# Patient Record
Sex: MALE | Race: ASIAN | NOT HISPANIC OR LATINO | URBAN - METROPOLITAN AREA
[De-identification: names, ages, dates, MRNs, and addresses within clinical notes are randomized per-mention and may not be internally consistent; named-entity substitution may affect disease eponyms.]

---

## 2018-06-12 ENCOUNTER — EMERGENCY (EMERGENCY)
Facility: HOSPITAL | Age: 83
LOS: 1 days | Discharge: HOME | End: 2018-06-12
Attending: EMERGENCY MEDICINE | Admitting: EMERGENCY MEDICINE
Payer: MEDICARE

## 2018-06-12 VITALS
HEART RATE: 77 BPM | SYSTOLIC BLOOD PRESSURE: 200 MMHG | TEMPERATURE: 98 F | RESPIRATION RATE: 16 BRPM | DIASTOLIC BLOOD PRESSURE: 88 MMHG | OXYGEN SATURATION: 99 %

## 2018-06-12 VITALS
SYSTOLIC BLOOD PRESSURE: 178 MMHG | HEART RATE: 51 BPM | RESPIRATION RATE: 17 BRPM | TEMPERATURE: 98 F | OXYGEN SATURATION: 100 % | DIASTOLIC BLOOD PRESSURE: 69 MMHG

## 2018-06-12 DIAGNOSIS — Z90.49 ACQUIRED ABSENCE OF OTHER SPECIFIED PARTS OF DIGESTIVE TRACT: Chronic | ICD-10-CM

## 2018-06-12 LAB
ALBUMIN SERPL ELPH-MCNC: 3 G/DL — LOW (ref 3.3–5)
ALP SERPL-CCNC: 65 U/L — SIGNIFICANT CHANGE UP (ref 40–120)
ALT FLD-CCNC: 11 U/L — SIGNIFICANT CHANGE UP (ref 4–41)
APPEARANCE UR: CLEAR — SIGNIFICANT CHANGE UP
AST SERPL-CCNC: 19 U/L — SIGNIFICANT CHANGE UP (ref 4–40)
BACTERIA # UR AUTO: SIGNIFICANT CHANGE UP
BASE EXCESS BLDV CALC-SCNC: 2.4 MMOL/L — SIGNIFICANT CHANGE UP
BASOPHILS # BLD AUTO: 0.02 K/UL — SIGNIFICANT CHANGE UP (ref 0–0.2)
BASOPHILS NFR BLD AUTO: 0.3 % — SIGNIFICANT CHANGE UP (ref 0–2)
BILIRUB SERPL-MCNC: < 0.2 MG/DL — LOW (ref 0.2–1.2)
BILIRUB UR-MCNC: NEGATIVE — SIGNIFICANT CHANGE UP
BLOOD GAS VENOUS - CREATININE: 1.57 MG/DL — HIGH (ref 0.5–1.3)
BLOOD UR QL VISUAL: HIGH
BUN SERPL-MCNC: 24 MG/DL — HIGH (ref 7–23)
CALCIUM SERPL-MCNC: 8.6 MG/DL — SIGNIFICANT CHANGE UP (ref 8.4–10.5)
CHLORIDE BLDV-SCNC: 110 MMOL/L — HIGH (ref 96–108)
CHLORIDE SERPL-SCNC: 104 MMOL/L — SIGNIFICANT CHANGE UP (ref 98–107)
CO2 SERPL-SCNC: 24 MMOL/L — SIGNIFICANT CHANGE UP (ref 22–31)
COLOR SPEC: SIGNIFICANT CHANGE UP
CREAT SERPL-MCNC: 1.57 MG/DL — HIGH (ref 0.5–1.3)
EOSINOPHIL # BLD AUTO: 0.04 K/UL — SIGNIFICANT CHANGE UP (ref 0–0.5)
EOSINOPHIL NFR BLD AUTO: 0.6 % — SIGNIFICANT CHANGE UP (ref 0–6)
GAS PNL BLDV: 135 MMOL/L — LOW (ref 136–146)
GLUCOSE BLDV-MCNC: 164 — HIGH (ref 70–99)
GLUCOSE SERPL-MCNC: 153 MG/DL — HIGH (ref 70–99)
GLUCOSE UR-MCNC: NEGATIVE — SIGNIFICANT CHANGE UP
HBA1C BLD-MCNC: 8 % — HIGH (ref 4–5.6)
HCO3 BLDV-SCNC: 25 MMOL/L — SIGNIFICANT CHANGE UP (ref 20–27)
HCT VFR BLD CALC: 34.3 % — LOW (ref 39–50)
HCT VFR BLDV CALC: 35.2 % — LOW (ref 39–51)
HGB BLD-MCNC: 10.8 G/DL — LOW (ref 13–17)
HGB BLDV-MCNC: 11.4 G/DL — LOW (ref 13–17)
IMM GRANULOCYTES # BLD AUTO: 0.02 # — SIGNIFICANT CHANGE UP
IMM GRANULOCYTES NFR BLD AUTO: 0.3 % — SIGNIFICANT CHANGE UP (ref 0–1.5)
KETONES UR-MCNC: NEGATIVE — SIGNIFICANT CHANGE UP
LACTATE BLDV-MCNC: 0.7 MMOL/L — SIGNIFICANT CHANGE UP (ref 0.5–2)
LEUKOCYTE ESTERASE UR-ACNC: NEGATIVE — SIGNIFICANT CHANGE UP
LIDOCAIN IGE QN: 33.9 U/L — SIGNIFICANT CHANGE UP (ref 7–60)
LYMPHOCYTES # BLD AUTO: 1.25 K/UL — SIGNIFICANT CHANGE UP (ref 1–3.3)
LYMPHOCYTES # BLD AUTO: 19.7 % — SIGNIFICANT CHANGE UP (ref 13–44)
MCHC RBC-ENTMCNC: 26.9 PG — LOW (ref 27–34)
MCHC RBC-ENTMCNC: 31.5 % — LOW (ref 32–36)
MCV RBC AUTO: 85.3 FL — SIGNIFICANT CHANGE UP (ref 80–100)
MONOCYTES # BLD AUTO: 0.53 K/UL — SIGNIFICANT CHANGE UP (ref 0–0.9)
MONOCYTES NFR BLD AUTO: 8.4 % — SIGNIFICANT CHANGE UP (ref 2–14)
MUCOUS THREADS # UR AUTO: SIGNIFICANT CHANGE UP
NEUTROPHILS # BLD AUTO: 4.47 K/UL — SIGNIFICANT CHANGE UP (ref 1.8–7.4)
NEUTROPHILS NFR BLD AUTO: 70.7 % — SIGNIFICANT CHANGE UP (ref 43–77)
NITRITE UR-MCNC: NEGATIVE — SIGNIFICANT CHANGE UP
NRBC # FLD: 0 — SIGNIFICANT CHANGE UP
PCO2 BLDV: 53 MMHG — HIGH (ref 41–51)
PH BLDV: 7.34 PH — SIGNIFICANT CHANGE UP (ref 7.32–7.43)
PH UR: 6 — SIGNIFICANT CHANGE UP (ref 4.6–8)
PLATELET # BLD AUTO: 168 K/UL — SIGNIFICANT CHANGE UP (ref 150–400)
PMV BLD: 10 FL — SIGNIFICANT CHANGE UP (ref 7–13)
PO2 BLDV: 30 MMHG — LOW (ref 35–40)
POTASSIUM BLDV-SCNC: 4.4 MMOL/L — SIGNIFICANT CHANGE UP (ref 3.4–4.5)
POTASSIUM SERPL-MCNC: 4.8 MMOL/L — SIGNIFICANT CHANGE UP (ref 3.5–5.3)
POTASSIUM SERPL-SCNC: 4.8 MMOL/L — SIGNIFICANT CHANGE UP (ref 3.5–5.3)
PROT SERPL-MCNC: 6.3 G/DL — SIGNIFICANT CHANGE UP (ref 6–8.3)
PROT UR-MCNC: 300 MG/DL — SIGNIFICANT CHANGE UP
RBC # BLD: 4.02 M/UL — LOW (ref 4.2–5.8)
RBC # FLD: 13.2 % — SIGNIFICANT CHANGE UP (ref 10.3–14.5)
RBC CASTS # UR COMP ASSIST: SIGNIFICANT CHANGE UP (ref 0–?)
SAO2 % BLDV: 49.4 % — LOW (ref 60–85)
SODIUM SERPL-SCNC: 138 MMOL/L — SIGNIFICANT CHANGE UP (ref 135–145)
SP GR SPEC: 1.01 — SIGNIFICANT CHANGE UP (ref 1–1.04)
UROBILINOGEN FLD QL: NORMAL MG/DL — SIGNIFICANT CHANGE UP
WBC # BLD: 6.33 K/UL — SIGNIFICANT CHANGE UP (ref 3.8–10.5)
WBC # FLD AUTO: 6.33 K/UL — SIGNIFICANT CHANGE UP (ref 3.8–10.5)
WBC UR QL: SIGNIFICANT CHANGE UP (ref 0–?)

## 2018-06-12 PROCEDURE — 99220: CPT

## 2018-06-12 PROCEDURE — 74181 MRI ABDOMEN W/O CONTRAST: CPT | Mod: 26

## 2018-06-12 PROCEDURE — 74177 CT ABD & PELVIS W/CONTRAST: CPT | Mod: 26

## 2018-06-12 PROCEDURE — 93010 ELECTROCARDIOGRAM REPORT: CPT | Mod: 59

## 2018-06-12 RX ORDER — CEFTRIAXONE 500 MG/1
1 INJECTION, POWDER, FOR SOLUTION INTRAMUSCULAR; INTRAVENOUS EVERY 24 HOURS
Qty: 0 | Refills: 0 | Status: DISCONTINUED | OUTPATIENT
Start: 2018-06-12 | End: 2018-06-16

## 2018-06-12 RX ORDER — CEFDINIR 250 MG/5ML
1 POWDER, FOR SUSPENSION ORAL
Qty: 28 | Refills: 0 | OUTPATIENT
Start: 2018-06-12 | End: 2018-06-25

## 2018-06-12 RX ORDER — METOPROLOL TARTRATE 50 MG
50 TABLET ORAL ONCE
Qty: 0 | Refills: 0 | Status: COMPLETED | OUTPATIENT
Start: 2018-06-12 | End: 2018-06-12

## 2018-06-12 RX ORDER — SODIUM CHLORIDE 9 MG/ML
1000 INJECTION INTRAMUSCULAR; INTRAVENOUS; SUBCUTANEOUS ONCE
Qty: 0 | Refills: 0 | Status: COMPLETED | OUTPATIENT
Start: 2018-06-12 | End: 2018-06-12

## 2018-06-12 RX ORDER — DEXTROSE 50 % IN WATER 50 %
50 SYRINGE (ML) INTRAVENOUS ONCE
Qty: 0 | Refills: 0 | Status: COMPLETED | OUTPATIENT
Start: 2018-06-12 | End: 2018-06-12

## 2018-06-12 RX ADMIN — Medication 50 MILLILITER(S): at 13:05

## 2018-06-12 RX ADMIN — SODIUM CHLORIDE 1000 MILLILITER(S): 9 INJECTION INTRAMUSCULAR; INTRAVENOUS; SUBCUTANEOUS at 08:45

## 2018-06-12 RX ADMIN — CEFTRIAXONE 100 GRAM(S): 500 INJECTION, POWDER, FOR SOLUTION INTRAMUSCULAR; INTRAVENOUS at 11:49

## 2018-06-12 RX ADMIN — Medication 50 MILLIGRAM(S): at 08:54

## 2018-06-12 NOTE — ED ADULT NURSE NOTE - CAS DISCH TRANSFER METHOD
Follow up with MD as directed call if any increased pain, fever, chills, pain on urination, increased oozing and drainage at incision.
Private car

## 2018-06-12 NOTE — ED CDU PROVIDER INITIAL DAY NOTE - ATTENDING CONTRIBUTION TO CARE
I, Jennifer Cabot, MD, have performed a history and physical exam of the patient and discussed their management with the ACP.  I reviewed the PA's note and agree with the documented findings and plan of care. My medical decision making and observations are found above.    83M with PMH of CAD s/p stents and CABG, HTN, HLD, and PSH of CABG and CCY who presented to the ED with RLQ pain and right flank pain since yesterday.  No F/C/N/V/D/urinary sx/CP/SOB.  CT shows mild dilation of the CBD (which may be consistent with post-CCY changes) and no other acute pathology.  The patient is being transferred to the CDU for MRCP.  Cardiology confirms that stents are compatible with MRI.  Will speak with CT surgery re: wires and MR.  On exam, hypertensive but NAD, lungs CTAB, heart sounds normal, abd soft, ND, TTP in the RLQ, no G/R, + R CVAT.  UA + for bacteria.  Bili < 0.2, LFTs and AP normal.  Likely pyelo, but will obtain MRCP to assess for recurrent CBD stone.  Will tx pyelo with CTX, followed by keflex upon discharge. I, Jennifer Cabot, MD, have performed a history and physical exam of the patient and discussed their management with the ACP.  I reviewed the PA's note and agree with the documented findings and plan of care. My medical decision making and observations are found above.    83M with PMH of CAD s/p stents and CABG, HTN, HLD, and PSH of CABG and CCY who presented to the ED with RLQ pain and right flank pain since yesterday.  No F/C/N/V/D/urinary sx/CP/SOB.  CT shows mild dilation of the CBD (which may be consistent with post-CCY changes) and no other acute pathology.  The patient is being transferred to the CDU for MRCP.  Cardiology confirms that stents are compatible with MRI.  Will speak with CT surgery re: wires and MR.  On exam, hypertensive but NAD, lungs CTAB, heart sounds normal, abd soft, ND, TTP in the RLQ, no G/R, + R CVAT.  UA + for bacteria.  Bili < 0.2, LFTs and AP normal.  Likely pyelo, but will obtain MRCP to assess for recurrent CBD stone.  Will tx pyelo with CTX, followed by cefdinir upon discharge.

## 2018-06-12 NOTE — ED ADULT NURSE NOTE - CHIEF COMPLAINT QUOTE
Pt is [place]. C/o right flank pain radiating around to RLQ since yesterday. Worse with movement. Denies urinary symptoms or trauma to area. H/o DM, HTN.

## 2018-06-12 NOTE — ED PROVIDER NOTE - PROGRESS NOTE DETAILS
Spoke with GI, indicated 1.2cm CBC is concerning s/p cholecystecomy in a pt who is having pain symptoms. Ordered MRCP and spoke with CDU to come eval pt.

## 2018-06-12 NOTE — ED CDU PROVIDER INITIAL DAY NOTE - PROGRESS NOTE DETAILS
83M with PMH of CAD s/p stents and CABG, HTN, HLD, and PSH of CABG and CCY who presented to the ED with RLQ pain and right flank pain since yesterday.  No F/C/N/V/D/urinary sx/CP/SOB.  CT shows mild dilation of the CBD (which may be consistent with post-CCY changes) and no other acute pathology.  The patient is being transferred to the CDU for MRCP.  Cardiology confirms that stents are compatible with MRI.  Will speak with CT surgery re: wires and MR.  On exam, hypertensive but NAD, lungs CTAB, heart sounds normal, abd soft, ND, TTP in the RLQ, no G/R, + R CVAT.  UA + for bacteria.  Bili < 0.2, LFTs and AP normal.  Likely pyelo, but will obtain MRCP to assess for recurrent CBD stone.  Will tx pyelo with CTX, followed by keflex upon discharge. 83M with PMH of CAD s/p stents and CABG, HTN, HLD, and PSH of CABG and CCY who presented to the ED with RLQ pain and right flank pain since yesterday.  No F/C/N/V/D/urinary sx/CP/SOB.  CT shows mild dilation of the CBD (which may be consistent with post-CCY changes) and no other acute pathology.  The patient is being transferred to the CDU for MRCP.  Cardiology confirms that stents are compatible with MRI.  Will speak with CT surgery re: wires and MR.  On exam, hypertensive but NAD, lungs CTAB, heart sounds normal, abd soft, ND, TTP in the RLQ, no G/R, + R CVAT.  UA + for bacteria.  Bili < 0.2, LFTs and AP normal.  Likely pyelo, but will obtain MRCP to assess for recurrent CBD stone.  Will tx pyelo with CTX, followed by cefdinir upon discharge. MRCP without any obstructing stones. Discussed with attending, patient can dispo home to f/u with PCP and GI. The patient was given verbal and written discharge instructions. Specifically, instructions when to return to the ED and when to seek follow-up from their pcp was discussed. Any specialty follow-up was discussed, including how to make an appointment.  Instructions were discussed in simple, plain language and was understood by the patient. The patient understands that should their symptoms worsen or any new symptoms arise, they should return to the ED immediately for further evaluation. All pt's questions were answered. Patient verbalizes understanding. Instructions were given to son and grandson.

## 2018-06-12 NOTE — ED ADULT NURSE NOTE - OBJECTIVE STATEMENT
pt. w/ hx. DM and HTN c/o rlq abd pain radiating to right flank pain. Pt. denies any N/V , and bleeding or burning during urination. Pt. hard of hearing A&Ox3 appears in NAD. Pt. Vitals as noted, and in no acute distress. Awaiting further dispo by MD.

## 2018-06-12 NOTE — ED CDU PROVIDER DISPOSITION NOTE - CLINICAL COURSE
Cabot: 83M with PMH of CAD s/p stents and CABG, HTN, HLD, and PSH of CABG and CCY who presented to the ED with RLQ pain and right flank pain since yesterday.  No F/C/N/V/D/urinary sx/CP/SOB.  CT shows mild dilation of the CBD (which may be consistent with post-CCY changes), a small pancreatic cyst, and no other acute pathology.  The patient was transferred to the CDU for MRCP, which showed no stone or other obstructing lesion.  UA + for bacteria.  Bili < 0.2, LFTs and AP normal.  On exam, hypertensive but NAD, lungs CTAB, heart sounds normal, abd soft, ND, TTP in the RLQ, no G/R, + R CVAT.  Impression: Likely pyelonephritis.  The patient received IV ceftriaxone in the CDU and will receive keflex upon discharge for two weeks.  He will follow up with his PMD and also with GI re: the cyst in the pancreas. Cabot: 83M with PMH of CAD s/p stents and CABG, HTN, HLD, and PSH of CABG and CCY who presented to the ED with RLQ pain and right flank pain since yesterday.  No F/C/N/V/D/urinary sx/CP/SOB.  CT shows mild dilation of the CBD (which may be consistent with post-CCY changes), a small pancreatic cyst, and no other acute pathology.  The patient was transferred to the CDU for MRCP, which showed no stone or other obstructing lesion.  UA + for bacteria.  Bili < 0.2, LFTs and AP normal.  On exam, hypertensive but NAD, lungs CTAB, heart sounds normal, abd soft, ND, TTP in the RLQ, no G/R, + R CVAT.  Impression: Likely pyelonephritis.  The patient received IV ceftriaxone in the CDU and will receive cefdinir upon discharge for two weeks.  He will follow up with his PMD and also with GI re: the cyst in the pancreas. Cabot: 83M with PMH of CAD s/p stents and CABG, HTN, HLD, and PSH of CABG and CCY who presented to the ED with RLQ pain and right flank pain since yesterday.  No F/C/N/V/D/urinary sx/CP/SOB.  CT shows mild dilation of the CBD (which may be consistent with post-CCY changes), a small pancreatic cyst, and no other acute pathology.  The patient was transferred to the CDU for MRCP, which showed no stone or other obstructing lesion.  UA + for bacteria.  Bili < 0.2, LFTs and AP normal.  On exam, hypertensive but NAD, lungs CTAB, heart sounds normal, abd soft, ND, TTP in the RLQ, no G/R, + mild R CVAT.  Impression: Likely pyelonephritis.  The patient received IV ceftriaxone in the CDU and will receive cefdinir upon discharge for two weeks.  He will follow up with his PMD and also with GI re: the cyst in the pancreas.

## 2018-06-12 NOTE — ED CDU PROVIDER INITIAL DAY NOTE - MEDICAL DECISION MAKING DETAILS
83M with PMH of CAD s/p stents and CABG, HTN, HLD, and PSH of CABG and CCY who presented to the ED with RLQ pain and right flank pain since yesterday.  No F/C/N/V/D/urinary sx/CP/SOB.  CT shows mild dilation of the CBD (which may be consistent with post-CCY changes) and no other acute pathology.  The patient is being transferred to the CDU for MRCP.  Cardiology confirms that stents are compatible with MRI.  Will speak with CT surgery re: wires and MR.  On exam, hypertensive but NAD, lungs CTAB, heart sounds normal, abd soft, ND, TTP in the RLQ, no G/R, + R CVAT.  UA + for bacteria.  Bili < 0.2, LFTs and AP normal.  Likely pyelo, but will obtain MRCP to assess for recurrent CBD stone.  Will tx pyelo with CTX, followed by keflex upon discharge.

## 2018-06-12 NOTE — ED PROVIDER NOTE - OBJECTIVE STATEMENT
82 yo male with PMH of CAD s/p CABG, HTN, HLD, hard of hearing, presents to the ED c/o abdominal pain and right flank pain since yesterday. States pain is RUQ radiating to right flank, sharp in nature, and is exacerbated with movement and almost imperceptible when stationary. Pt denies fever, chills, n/v, urinary symptoms, trauma, previous abdominal or back surgery, however on chart review, patient is s/p cholecystectomy. Appears NAD lying supine in bed, speaking in full sentences, A&O x3. Pt appears very well for his stated age but is a relatively poor historian.

## 2018-06-12 NOTE — ED CDU PROVIDER INITIAL DAY NOTE - OBJECTIVE STATEMENT
83M with PMH of CAD s/p stents and CABG, HTN, HLD, and PSH of CABG and CCY who presented to the ED with RLQ pain and right flank pain since yesterday.  No F/C/N/V/D/urinary sx/CP/SOB.  CT shows mild dilation of the CBD (which may be consistent with post-CCY changes) and no other acute pathology.  The patient is being transferred to the CDU for MRCP.  Cardiology confirms that stents are compatible with MRI.  Will speak with CT surgery re: wires and MR.  On exam, hypertensive but NAD, lungs CTAB, heart sounds normal, abd soft, ND, TTP in the RLQ, no G/R, + R CVAT.  UA + for bacteria.  Bili < 0.2, LFTs and AP normal.  Likely pyelo, but will obtain MRCP to assess for recurrent CBD stone.  Will tx pyelo with CTX, followed by keflex upon discharge. 83M with PMH of CAD s/p stents and CABG, HTN, HLD, and PSH of CABG and CCY who presented to the ED with RLQ pain and right flank pain since yesterday.  No F/C/N/V/D/urinary sx/CP/SOB.  CT shows mild dilation of the CBD (which may be consistent with post-CCY changes) and no other acute pathology.  The patient is being transferred to the CDU for MRCP.  Cardiology confirms that stents are compatible with MRI.  Will speak with CT surgery re: wires and MR.  On exam, hypertensive but NAD, lungs CTAB, heart sounds normal, abd soft, ND, TTP in the RLQ, no G/R, + R CVAT.  UA + for bacteria.  Bili < 0.2, LFTs and AP normal.  Likely pyelo, but will obtain MRCP to assess for recurrent CBD stone.  Will tx pyelo with CTX, followed by cefdinir upon discharge.

## 2018-06-12 NOTE — ED PROVIDER NOTE - ATTENDING CONTRIBUTION TO CARE
82 yo with R sided abd pain rad to R back/flank.  Not related to food and worse with movement.  No urinary complaints and no fever    Gen: Well appearing in NAD  Head: NC/AT  Neck: trachea midline  Resp:  No distress  Abd: soft Tender to palpation in the RLQ, ND  Ext: no deformities  Neuro:  A&O appears non focal  Skin:  Warm and dry as visualized    Pt with Abd pain in setting of history of cholecystecomy.  Will require further imaging and dispo based on results of imaging

## 2018-06-12 NOTE — ED PROVIDER NOTE - MEDICAL DECISION MAKING DETAILS
84 yo male with PMH of CAD, CABG, cholecystectomy, presenting with right sided abdominal and flank pain since yesterday exacerbated with movement; EKG, labs including lipase and lactate, UA and urine culture, CT abd/pelvis w/ contrast if Cr ok.

## 2018-06-12 NOTE — ED CDU PROVIDER DISPOSITION NOTE - ATTENDING CONTRIBUTION TO CARE
I, Jennifer Cabot, MD, have performed a history and physical exam of the patient and discussed their management with the ACP.  I reviewed the PA's note and agree with the documented findings and plan of care. My medical decision making and observations are found above.    Cabot: 83M with PMH of CAD s/p stents and CABG, HTN, HLD, and PSH of CABG and CCY who presented to the ED with RLQ pain and right flank pain since yesterday.  No F/C/N/V/D/urinary sx/CP/SOB.  CT shows mild dilation of the CBD (which may be consistent with post-CCY changes), a small pancreatic cyst, and no other acute pathology.  The patient was transferred to the CDU for MRCP, which showed no stone or other obstructing lesion.  UA + for bacteria.  Bili < 0.2, LFTs and AP normal.  On exam, hypertensive but NAD, lungs CTAB, heart sounds normal, abd soft, ND, TTP in the RLQ, no G/R, + R CVAT.  Impression: Likely pyelonephritis.  The patient received IV ceftriaxone in the CDU and will receive keflex upon discharge for two weeks.  He will follow up with his PMD and also with GI re: the cyst in the pancreas. I, Jennifer Cabot, MD, have performed a history and physical exam of the patient and discussed their management with the ACP.  I reviewed the PA's note and agree with the documented findings and plan of care. My medical decision making and observations are found above.    Cabot: 83M with PMH of CAD s/p stents and CABG, HTN, HLD, and PSH of CABG and CCY who presented to the ED with RLQ pain and right flank pain since yesterday.  No F/C/N/V/D/urinary sx/CP/SOB.  CT shows mild dilation of the CBD (which may be consistent with post-CCY changes), a small pancreatic cyst, and no other acute pathology.  The patient was transferred to the CDU for MRCP, which showed no stone or other obstructing lesion.  UA + for bacteria.  Bili < 0.2, LFTs and AP normal.  On exam, hypertensive but NAD, lungs CTAB, heart sounds normal, abd soft, ND, TTP in the RLQ, no G/R, + R CVAT.  Impression: Likely pyelonephritis.  The patient received IV ceftriaxone in the CDU and will receive cefdinir upon discharge for two weeks.  He will follow up with his PMD and also with GI re: the cyst in the pancreas. I, Jennifer Cabot, MD, have performed a history and physical exam of the patient and discussed their management with the ACP.  I reviewed the PA's note and agree with the documented findings and plan of care. My medical decision making and observations are found above.    Cabot: 83M with PMH of CAD s/p stents and CABG, HTN, HLD, and PSH of CABG and CCY who presented to the ED with RLQ pain and right flank pain since yesterday.  No F/C/N/V/D/urinary sx/CP/SOB.  CT shows mild dilation of the CBD (which may be consistent with post-CCY changes), a small pancreatic cyst, and no other acute pathology.  The patient was transferred to the CDU for MRCP, which showed no stone or other obstructing lesion.  UA + for bacteria.  Bili < 0.2, LFTs and AP normal.  On exam, hypertensive but NAD, lungs CTAB, heart sounds normal, abd soft, ND, TTP in the RLQ, no G/R, + R mild CVAT.  Impression: Likely pyelonephritis.  The patient received IV ceftriaxone in the CDU and will receive cefdinir upon discharge for two weeks.  He will follow up with his PMD and also with GI re: the cyst in the pancreas.

## 2018-06-12 NOTE — ED ADULT TRIAGE NOTE - CHIEF COMPLAINT QUOTE
Pt is [place]. C/o right flank pain radiating around to RLQ since yesterday. Worse with movement. Denies urinary symptoms or trauma to area. H/o DM, HTN.

## 2018-06-13 LAB — SPECIMEN SOURCE: SIGNIFICANT CHANGE UP

## 2018-06-14 LAB — BACTERIA UR CULT: SIGNIFICANT CHANGE UP

## 2018-10-03 ENCOUNTER — INPATIENT (INPATIENT)
Facility: HOSPITAL | Age: 83
LOS: 4 days | Discharge: NURSING, REHAB OR LONG-TERM CARE | End: 2018-10-08
Attending: INTERNAL MEDICINE | Admitting: INTERNAL MEDICINE
Payer: MEDICARE

## 2018-10-03 VITALS
HEART RATE: 71 BPM | SYSTOLIC BLOOD PRESSURE: 177 MMHG | RESPIRATION RATE: 18 BRPM | TEMPERATURE: 101 F | DIASTOLIC BLOOD PRESSURE: 73 MMHG | OXYGEN SATURATION: 100 %

## 2018-10-03 DIAGNOSIS — E11.9 TYPE 2 DIABETES MELLITUS WITHOUT COMPLICATIONS: ICD-10-CM

## 2018-10-03 DIAGNOSIS — Z29.9 ENCOUNTER FOR PROPHYLACTIC MEASURES, UNSPECIFIED: ICD-10-CM

## 2018-10-03 DIAGNOSIS — R80.1 PERSISTENT PROTEINURIA, UNSPECIFIED: ICD-10-CM

## 2018-10-03 DIAGNOSIS — R10.84 GENERALIZED ABDOMINAL PAIN: ICD-10-CM

## 2018-10-03 DIAGNOSIS — R50.9 FEVER, UNSPECIFIED: ICD-10-CM

## 2018-10-03 DIAGNOSIS — N18.3 CHRONIC KIDNEY DISEASE, STAGE 3 (MODERATE): ICD-10-CM

## 2018-10-03 DIAGNOSIS — I10 ESSENTIAL (PRIMARY) HYPERTENSION: ICD-10-CM

## 2018-10-03 DIAGNOSIS — Z90.49 ACQUIRED ABSENCE OF OTHER SPECIFIED PARTS OF DIGESTIVE TRACT: Chronic | ICD-10-CM

## 2018-10-03 LAB
ALBUMIN SERPL ELPH-MCNC: 2.7 G/DL — LOW (ref 3.3–5)
ALP SERPL-CCNC: 85 U/L — SIGNIFICANT CHANGE UP (ref 40–120)
ALT FLD-CCNC: 20 U/L — SIGNIFICANT CHANGE UP (ref 4–41)
APPEARANCE UR: CLEAR — SIGNIFICANT CHANGE UP
APTT BLD: 33.1 SEC — SIGNIFICANT CHANGE UP (ref 27.5–37.4)
AST SERPL-CCNC: 35 U/L — SIGNIFICANT CHANGE UP (ref 4–40)
BACTERIA # UR AUTO: NEGATIVE — SIGNIFICANT CHANGE UP
BASE EXCESS BLDV CALC-SCNC: 3.8 MMOL/L — SIGNIFICANT CHANGE UP
BASOPHILS # BLD AUTO: 0.02 K/UL — SIGNIFICANT CHANGE UP (ref 0–0.2)
BASOPHILS NFR BLD AUTO: 0.3 % — SIGNIFICANT CHANGE UP (ref 0–2)
BILIRUB SERPL-MCNC: 0.3 MG/DL — SIGNIFICANT CHANGE UP (ref 0.2–1.2)
BILIRUB UR-MCNC: NEGATIVE — SIGNIFICANT CHANGE UP
BLOOD GAS VENOUS - CREATININE: 1.5 MG/DL — HIGH (ref 0.5–1.3)
BLOOD UR QL VISUAL: SIGNIFICANT CHANGE UP
BUN SERPL-MCNC: 28 MG/DL — HIGH (ref 7–23)
CALCIUM SERPL-MCNC: 7.5 MG/DL — LOW (ref 8.4–10.5)
CHLORIDE BLDV-SCNC: 103 MMOL/L — SIGNIFICANT CHANGE UP (ref 96–108)
CHLORIDE SERPL-SCNC: 99 MMOL/L — SIGNIFICANT CHANGE UP (ref 98–107)
CO2 SERPL-SCNC: 26 MMOL/L — SIGNIFICANT CHANGE UP (ref 22–31)
COLOR SPEC: YELLOW — SIGNIFICANT CHANGE UP
CREAT SERPL-MCNC: 1.62 MG/DL — HIGH (ref 0.5–1.3)
EOSINOPHIL # BLD AUTO: 0 K/UL — SIGNIFICANT CHANGE UP (ref 0–0.5)
EOSINOPHIL NFR BLD AUTO: 0 % — SIGNIFICANT CHANGE UP (ref 0–6)
GAS PNL BLDV: 130 MMOL/L — LOW (ref 136–146)
GLUCOSE BLDC GLUCOMTR-MCNC: 148 MG/DL — HIGH (ref 70–99)
GLUCOSE BLDV-MCNC: 57 — LOW (ref 70–99)
GLUCOSE SERPL-MCNC: 58 MG/DL — LOW (ref 70–99)
GLUCOSE UR-MCNC: NEGATIVE — SIGNIFICANT CHANGE UP
HCO3 BLDV-SCNC: 27 MMOL/L — SIGNIFICANT CHANGE UP (ref 20–27)
HCT VFR BLD CALC: 29.2 % — LOW (ref 39–50)
HCT VFR BLDV CALC: 20.3 % — CRITICAL LOW (ref 39–51)
HGB BLD-MCNC: 9.3 G/DL — LOW (ref 13–17)
HGB BLDV-MCNC: 6.5 G/DL — CRITICAL LOW (ref 13–17)
HYALINE CASTS # UR AUTO: SIGNIFICANT CHANGE UP
IMM GRANULOCYTES # BLD AUTO: 0.03 # — SIGNIFICANT CHANGE UP
IMM GRANULOCYTES NFR BLD AUTO: 0.5 % — SIGNIFICANT CHANGE UP (ref 0–1.5)
INR BLD: 1.07 — SIGNIFICANT CHANGE UP (ref 0.88–1.17)
KETONES UR-MCNC: NEGATIVE — SIGNIFICANT CHANGE UP
LACTATE BLDV-MCNC: 1.3 MMOL/L — SIGNIFICANT CHANGE UP (ref 0.5–2)
LEUKOCYTE ESTERASE UR-ACNC: NEGATIVE — SIGNIFICANT CHANGE UP
LYMPHOCYTES # BLD AUTO: 1.1 K/UL — SIGNIFICANT CHANGE UP (ref 1–3.3)
LYMPHOCYTES # BLD AUTO: 18.9 % — SIGNIFICANT CHANGE UP (ref 13–44)
MAGNESIUM SERPL-MCNC: 1.8 MG/DL — SIGNIFICANT CHANGE UP (ref 1.6–2.6)
MCHC RBC-ENTMCNC: 26.5 PG — LOW (ref 27–34)
MCHC RBC-ENTMCNC: 31.8 % — LOW (ref 32–36)
MCV RBC AUTO: 83.2 FL — SIGNIFICANT CHANGE UP (ref 80–100)
MONOCYTES # BLD AUTO: 0.83 K/UL — SIGNIFICANT CHANGE UP (ref 0–0.9)
MONOCYTES NFR BLD AUTO: 14.2 % — HIGH (ref 2–14)
NEUTROPHILS # BLD AUTO: 3.85 K/UL — SIGNIFICANT CHANGE UP (ref 1.8–7.4)
NEUTROPHILS NFR BLD AUTO: 66.1 % — SIGNIFICANT CHANGE UP (ref 43–77)
NITRITE UR-MCNC: NEGATIVE — SIGNIFICANT CHANGE UP
NRBC # FLD: 0 — SIGNIFICANT CHANGE UP
PCO2 BLDV: 46 MMHG — SIGNIFICANT CHANGE UP (ref 41–51)
PH BLDV: 7.41 PH — SIGNIFICANT CHANGE UP (ref 7.32–7.43)
PH UR: 6.5 — SIGNIFICANT CHANGE UP (ref 5–8)
PHOSPHATE SERPL-MCNC: 2.2 MG/DL — LOW (ref 2.5–4.5)
PLATELET # BLD AUTO: 155 K/UL — SIGNIFICANT CHANGE UP (ref 150–400)
PMV BLD: 10.9 FL — SIGNIFICANT CHANGE UP (ref 7–13)
PO2 BLDV: 28 MMHG — LOW (ref 35–40)
POTASSIUM BLDV-SCNC: 3.6 MMOL/L — SIGNIFICANT CHANGE UP (ref 3.4–4.5)
POTASSIUM SERPL-MCNC: 3.9 MMOL/L — SIGNIFICANT CHANGE UP (ref 3.5–5.3)
POTASSIUM SERPL-SCNC: 3.9 MMOL/L — SIGNIFICANT CHANGE UP (ref 3.5–5.3)
PROT SERPL-MCNC: 6.1 G/DL — SIGNIFICANT CHANGE UP (ref 6–8.3)
PROT UR-MCNC: >600 — HIGH
PROTHROM AB SERPL-ACNC: 11.9 SEC — SIGNIFICANT CHANGE UP (ref 9.8–13.1)
RBC # BLD: 3.51 M/UL — LOW (ref 4.2–5.8)
RBC # FLD: 12.9 % — SIGNIFICANT CHANGE UP (ref 10.3–14.5)
RBC CASTS # UR COMP ASSIST: SIGNIFICANT CHANGE UP (ref 0–?)
SAO2 % BLDV: 48.1 % — LOW (ref 60–85)
SODIUM SERPL-SCNC: 135 MMOL/L — SIGNIFICANT CHANGE UP (ref 135–145)
SP GR SPEC: 1.02 — SIGNIFICANT CHANGE UP (ref 1–1.04)
SQUAMOUS # UR AUTO: SIGNIFICANT CHANGE UP
UROBILINOGEN FLD QL: SIGNIFICANT CHANGE UP
WBC # BLD: 5.83 K/UL — SIGNIFICANT CHANGE UP (ref 3.8–10.5)
WBC # FLD AUTO: 5.83 K/UL — SIGNIFICANT CHANGE UP (ref 3.8–10.5)
WBC UR QL: SIGNIFICANT CHANGE UP (ref 0–?)

## 2018-10-03 PROCEDURE — 71046 X-RAY EXAM CHEST 2 VIEWS: CPT | Mod: 26

## 2018-10-03 PROCEDURE — 74177 CT ABD & PELVIS W/CONTRAST: CPT | Mod: 26

## 2018-10-03 PROCEDURE — 99223 1ST HOSP IP/OBS HIGH 75: CPT

## 2018-10-03 RX ORDER — GLUCAGON INJECTION, SOLUTION 0.5 MG/.1ML
1 INJECTION, SOLUTION SUBCUTANEOUS ONCE
Qty: 0 | Refills: 0 | Status: DISCONTINUED | OUTPATIENT
Start: 2018-10-03 | End: 2018-10-08

## 2018-10-03 RX ORDER — INFLUENZA VIRUS VACCINE 15; 15; 15; 15 UG/.5ML; UG/.5ML; UG/.5ML; UG/.5ML
0.5 SUSPENSION INTRAMUSCULAR ONCE
Qty: 0 | Refills: 0 | Status: DISCONTINUED | OUTPATIENT
Start: 2018-10-03 | End: 2018-10-08

## 2018-10-03 RX ORDER — DEXTROSE 50 % IN WATER 50 %
12.5 SYRINGE (ML) INTRAVENOUS ONCE
Qty: 0 | Refills: 0 | Status: DISCONTINUED | OUTPATIENT
Start: 2018-10-03 | End: 2018-10-08

## 2018-10-03 RX ORDER — SENNA PLUS 8.6 MG/1
2 TABLET ORAL AT BEDTIME
Qty: 0 | Refills: 0 | Status: DISCONTINUED | OUTPATIENT
Start: 2018-10-03 | End: 2018-10-08

## 2018-10-03 RX ORDER — INSULIN LISPRO 100/ML
VIAL (ML) SUBCUTANEOUS AT BEDTIME
Qty: 0 | Refills: 0 | Status: DISCONTINUED | OUTPATIENT
Start: 2018-10-03 | End: 2018-10-08

## 2018-10-03 RX ORDER — VANCOMYCIN HCL 1 G
1000 VIAL (EA) INTRAVENOUS ONCE
Qty: 0 | Refills: 0 | Status: COMPLETED | OUTPATIENT
Start: 2018-10-03 | End: 2018-10-03

## 2018-10-03 RX ORDER — FINASTERIDE 5 MG/1
5 TABLET, FILM COATED ORAL DAILY
Qty: 0 | Refills: 0 | Status: DISCONTINUED | OUTPATIENT
Start: 2018-10-03 | End: 2018-10-08

## 2018-10-03 RX ORDER — HEPARIN SODIUM 5000 [USP'U]/ML
5000 INJECTION INTRAVENOUS; SUBCUTANEOUS EVERY 8 HOURS
Qty: 0 | Refills: 0 | Status: DISCONTINUED | OUTPATIENT
Start: 2018-10-03 | End: 2018-10-08

## 2018-10-03 RX ORDER — SODIUM CHLORIDE 9 MG/ML
1000 INJECTION INTRAMUSCULAR; INTRAVENOUS; SUBCUTANEOUS ONCE
Qty: 0 | Refills: 0 | Status: COMPLETED | OUTPATIENT
Start: 2018-10-03 | End: 2018-10-03

## 2018-10-03 RX ORDER — SODIUM CHLORIDE 9 MG/ML
1000 INJECTION, SOLUTION INTRAVENOUS
Qty: 0 | Refills: 0 | Status: DISCONTINUED | OUTPATIENT
Start: 2018-10-03 | End: 2018-10-04

## 2018-10-03 RX ORDER — ACETAMINOPHEN 500 MG
650 TABLET ORAL ONCE
Qty: 0 | Refills: 0 | Status: COMPLETED | OUTPATIENT
Start: 2018-10-03 | End: 2018-10-03

## 2018-10-03 RX ORDER — DEXTROSE 50 % IN WATER 50 %
25 SYRINGE (ML) INTRAVENOUS ONCE
Qty: 0 | Refills: 0 | Status: DISCONTINUED | OUTPATIENT
Start: 2018-10-03 | End: 2018-10-08

## 2018-10-03 RX ORDER — INSULIN LISPRO 100/ML
VIAL (ML) SUBCUTANEOUS
Qty: 0 | Refills: 0 | Status: DISCONTINUED | OUTPATIENT
Start: 2018-10-03 | End: 2018-10-08

## 2018-10-03 RX ORDER — AMLODIPINE BESYLATE 2.5 MG/1
10 TABLET ORAL DAILY
Qty: 0 | Refills: 0 | Status: DISCONTINUED | OUTPATIENT
Start: 2018-10-03 | End: 2018-10-08

## 2018-10-03 RX ORDER — DEXTROSE 50 % IN WATER 50 %
25 SYRINGE (ML) INTRAVENOUS ONCE
Qty: 0 | Refills: 0 | Status: COMPLETED | OUTPATIENT
Start: 2018-10-03 | End: 2018-10-03

## 2018-10-03 RX ORDER — METOPROLOL TARTRATE 50 MG
50 TABLET ORAL DAILY
Qty: 0 | Refills: 0 | Status: DISCONTINUED | OUTPATIENT
Start: 2018-10-03 | End: 2018-10-08

## 2018-10-03 RX ORDER — SODIUM CHLORIDE 9 MG/ML
1000 INJECTION, SOLUTION INTRAVENOUS
Qty: 0 | Refills: 0 | Status: DISCONTINUED | OUTPATIENT
Start: 2018-10-03 | End: 2018-10-08

## 2018-10-03 RX ORDER — DOCUSATE SODIUM 100 MG
100 CAPSULE ORAL DAILY
Qty: 0 | Refills: 0 | Status: DISCONTINUED | OUTPATIENT
Start: 2018-10-03 | End: 2018-10-08

## 2018-10-03 RX ORDER — PIPERACILLIN AND TAZOBACTAM 4; .5 G/20ML; G/20ML
3.38 INJECTION, POWDER, LYOPHILIZED, FOR SOLUTION INTRAVENOUS EVERY 8 HOURS
Qty: 0 | Refills: 0 | Status: DISCONTINUED | OUTPATIENT
Start: 2018-10-03 | End: 2018-10-05

## 2018-10-03 RX ORDER — POLYETHYLENE GLYCOL 3350 17 G/17G
17 POWDER, FOR SOLUTION ORAL DAILY
Qty: 0 | Refills: 0 | Status: DISCONTINUED | OUTPATIENT
Start: 2018-10-03 | End: 2018-10-08

## 2018-10-03 RX ORDER — ASPIRIN/CALCIUM CARB/MAGNESIUM 324 MG
325 TABLET ORAL DAILY
Qty: 0 | Refills: 0 | Status: DISCONTINUED | OUTPATIENT
Start: 2018-10-03 | End: 2018-10-08

## 2018-10-03 RX ORDER — DEXTROSE 50 % IN WATER 50 %
15 SYRINGE (ML) INTRAVENOUS ONCE
Qty: 0 | Refills: 0 | Status: DISCONTINUED | OUTPATIENT
Start: 2018-10-03 | End: 2018-10-08

## 2018-10-03 RX ORDER — LISINOPRIL 2.5 MG/1
1 TABLET ORAL
Qty: 0 | Refills: 0 | COMMUNITY

## 2018-10-03 RX ORDER — TAMSULOSIN HYDROCHLORIDE 0.4 MG/1
0.4 CAPSULE ORAL AT BEDTIME
Qty: 0 | Refills: 0 | Status: DISCONTINUED | OUTPATIENT
Start: 2018-10-03 | End: 2018-10-08

## 2018-10-03 RX ORDER — PIPERACILLIN AND TAZOBACTAM 4; .5 G/20ML; G/20ML
3.38 INJECTION, POWDER, LYOPHILIZED, FOR SOLUTION INTRAVENOUS ONCE
Qty: 0 | Refills: 0 | Status: COMPLETED | OUTPATIENT
Start: 2018-10-03 | End: 2018-10-03

## 2018-10-03 RX ORDER — LISINOPRIL 2.5 MG/1
40 TABLET ORAL DAILY
Qty: 0 | Refills: 0 | Status: DISCONTINUED | OUTPATIENT
Start: 2018-10-03 | End: 2018-10-05

## 2018-10-03 RX ORDER — ACETAMINOPHEN 500 MG
650 TABLET ORAL EVERY 6 HOURS
Qty: 0 | Refills: 0 | Status: DISCONTINUED | OUTPATIENT
Start: 2018-10-03 | End: 2018-10-08

## 2018-10-03 RX ORDER — ACETAMINOPHEN 500 MG
1000 TABLET ORAL ONCE
Qty: 0 | Refills: 0 | Status: DISCONTINUED | OUTPATIENT
Start: 2018-10-03 | End: 2018-10-03

## 2018-10-03 RX ADMIN — SENNA PLUS 2 TABLET(S): 8.6 TABLET ORAL at 22:37

## 2018-10-03 RX ADMIN — Medication 25 MILLILITER(S): at 11:50

## 2018-10-03 RX ADMIN — PIPERACILLIN AND TAZOBACTAM 25 GRAM(S): 4; .5 INJECTION, POWDER, LYOPHILIZED, FOR SOLUTION INTRAVENOUS at 22:38

## 2018-10-03 RX ADMIN — Medication 250 MILLIGRAM(S): at 07:37

## 2018-10-03 RX ADMIN — Medication 650 MILLIGRAM(S): at 15:07

## 2018-10-03 RX ADMIN — Medication 650 MILLIGRAM(S): at 15:37

## 2018-10-03 RX ADMIN — PIPERACILLIN AND TAZOBACTAM 3.38 GRAM(S): 4; .5 INJECTION, POWDER, LYOPHILIZED, FOR SOLUTION INTRAVENOUS at 06:30

## 2018-10-03 RX ADMIN — HEPARIN SODIUM 5000 UNIT(S): 5000 INJECTION INTRAVENOUS; SUBCUTANEOUS at 22:37

## 2018-10-03 RX ADMIN — SODIUM CHLORIDE 1000 MILLILITER(S): 9 INJECTION INTRAMUSCULAR; INTRAVENOUS; SUBCUTANEOUS at 06:30

## 2018-10-03 RX ADMIN — TAMSULOSIN HYDROCHLORIDE 0.4 MILLIGRAM(S): 0.4 CAPSULE ORAL at 22:37

## 2018-10-03 RX ADMIN — Medication 650 MILLIGRAM(S): at 11:49

## 2018-10-03 RX ADMIN — SODIUM CHLORIDE 50 MILLILITER(S): 9 INJECTION, SOLUTION INTRAVENOUS at 15:07

## 2018-10-03 RX ADMIN — Medication 650 MILLIGRAM(S): at 07:48

## 2018-10-03 RX ADMIN — PIPERACILLIN AND TAZOBACTAM 200 GRAM(S): 4; .5 INJECTION, POWDER, LYOPHILIZED, FOR SOLUTION INTRAVENOUS at 06:21

## 2018-10-03 RX ADMIN — SODIUM CHLORIDE 1000 MILLILITER(S): 9 INJECTION INTRAMUSCULAR; INTRAVENOUS; SUBCUTANEOUS at 06:20

## 2018-10-03 NOTE — H&P ADULT - NSHPPHYSICALEXAM_GEN_ALL_CORE
Vital Signs Last 24 Hrs  T(C): 37.8 (03 Oct 2018 15:38), Max: 38.1 (03 Oct 2018 04:57)  T(F): 100.1 (03 Oct 2018 15:38), Max: 100.6 (03 Oct 2018 04:57)  HR: 67 (03 Oct 2018 15:38) (54 - 71)  BP: 151/41 (03 Oct 2018 15:38) (118/48 - 177/73)  BP(mean): --  RR: 18 (03 Oct 2018 15:38) (16 - 18)  SpO2: 96% (03 Oct 2018 15:38) (96% - 100%)

## 2018-10-03 NOTE — H&P ADULT - PROBLEM SELECTOR PLAN 3
hypoglycemic todayl ikely in setting of poor PO intake without adjustment of insulin.  -will hold off on lantus tonight and monitor on HISS.  -can restart lasix after calculate requirements.

## 2018-10-03 NOTE — H&P ADULT - PROBLEM SELECTOR PLAN 1
unclear abdominal pain.   -patient with fever and abdominal pain history, negative hanna sign, no abdominal pain, guarding or rigidity.  -no jaundice, does not technically meet chacot triad for cholangitis.  -patient also with 2 different pains, first pain is food related, improved with BM, second pain is associated with urination.  For mid abdomianl pain, will treat constipation first with aggressive bowel regimen with colace, senna, miralax.    -start with full liquid diet tonight and advance as tolerated.  -for suprapubic pain, negative UA, prior treatd with 2 weeks of PO cephalosporin. Given BPH and urinary symptoms ?chronic prostatitis?  -may need long term fluoroquinolone therapy, but will defer to Dr. Hodges. unclear abdominal pain.   -patient with fever and abdominal pain history, negative hanna sign, no abdominal pain, guarding or rigidity.  -no jaundice, does not technically meet chacot triad for cholangitis.  -patient also with 2 different pains, first pain is food related, improved with BM, second pain is associated with urination.  For mid abdomianl pain, will treat constipation first with aggressive bowel regimen with colace, senna, miralax.    -start with full liquid diet tonight and advance as tolerated.  -for suprapubic pain, negative UA, prior treatd with 2 weeks of PO cephalosporin. Given BPH and urinary symptoms ?chronic prostatitis?  -may need long term fluoroquinolone therapy, but will defer to Dr. Hodges.  -not as convinced that pancreatic cyst or chronic dilated bile ducts are etiology of symptoms, but if continued occurrence of pain, may warrant further GI workup.

## 2018-10-03 NOTE — ED ADULT NURSE NOTE - ED STAT RN HANDOFF DETAILS
Report given to SURG3 (overflow unit) RN.  Pt AAOx3, respirations even and unlabored.  Family at bedside.  Pt stable for transport to unit.

## 2018-10-03 NOTE — ED PROVIDER NOTE - PROGRESS NOTE DETAILS
CARLOS Patricia- received sign out from Dr. Grewal resident. pt feeling much better. c/o burning on urination. +fever in ED. pt given gastroview now, awaiting CT. will continue to monitor. CARLOS Patricia- left message for callback with pcp Dr. Villarreal CARLOS Patricia- received sign out from Dr. Grewal resident. plan to admit. pt feeling much better. c/o burning on urination. +fever in ED. pt given gastroview now, awaiting CT. will continue to monitor. CARLOS Patricia- will admit pt to medicine. spoke with Dr. Villarreal's NP Autumn, suggesing to admit to Dr. Hodges. called Dr. Hodges and aware of plan. MAR paged. CARLOS Patricia- as per Dr. Moreland pt had episode of lightheadedness, FS noted to be 44. given dextrose and feeling much better. FS now 96.

## 2018-10-03 NOTE — ED ADULT NURSE REASSESSMENT NOTE - NS ED NURSE REASSESS COMMENT FT1
Pt received awake and alert x 3 . pt denies any nausea no vomiting noted awaiting abd ct. Pts family at bedside.

## 2018-10-03 NOTE — CONSULT NOTE ADULT - SUBJECTIVE AND OBJECTIVE BOX
CARDIOLOGY CONSULT - Dr. Villarreal     CHIEF COMPLAINT: abd pain     HPI:  84 y.o. male with PMed/surg hx as mentioned below  p/w abdominal x 1 week.  He endorses generalized weakness and today noted to have fever. Patient is known to the practice.  Cardiac hx includes HTN, DM, CAD s/p CABG (2016), and post op Afib . He was recently at [facility] for abdominal pain. CT abd/pelvis and MRCP at that time revealed a pancreatic lesion, being followed by GI (Dr. Ball. No recent or active chest pain or sob. Wife at bedside reports patient has cough. Denies palpitations, n/v, dizziness, orthopnea, PND, Le edema, or exertional symptoms. ROS otherwise negative      PAST MEDICAL & SURGICAL HISTORY:  3-vessel coronary artery disease  HTN (hypertension)  DM (diabetes mellitus)  S/P cholecystectomy  Post- op Afib         PREVIOUS DIAGNOSTIC TESTING:    [ x] Echocardiogram:    < from: Transthoracic Echocardiogram (03.07.16 @ 14:08) >  EF (Teicholtz): 56 %  ------------------------------------------------------------------------  Observations:  Mitral Valve: Mitral annular calcification, otherwise  normal mitral valve. Mild mitral regurgitation.  Aortic Valve/Aorta: Thickened aortic valve. Trace aortic  regurgitation.  Normal aortic root size. (Ao: 3.4 cm at the sinuses of  Valsalva).  Left Atrium: Moderately dilated left atrium.  LA volume  index = 45 cc/m2.  Left Ventricle: Normal left ventricular systolic function.  No segmental wall motion abnormalities. Increased relative  wall thickness with normal left ventricular mass index,  consistent with concentric left ventricular remodeling.  Right Heart: Normal right atrium. Normal right ventricular  size and function. Normal tricuspid valve.  Mild tricuspid  regurgitation. Normal pulmonic valve.  Pericardium/Pleura: Minimal pericardial effusion.  Hemodynamic: Estimated right atrial pressure is 8 mm Hg.  Inadequate tricuspid regurgitation Doppler envelope  precludes estimation of RVSP.  ------------------------------------------------------------------------  Conclusions:  1. Mitral annular calcification, otherwise normal mitral  valve. Mild mitral regurgitation.  2. Thickened aortic valve. Trace aortic regurgitation.  3. Normal aortic root size. (Ao: 3.4 cm at the sinuses of  Valsalva).  4. Moderately dilated left atrium.  LA volume index = 45  cc/m2.  5. Increased relative wall thickness with normal left  ventricular mass index, consistent with concentric left  ventricular remodeling.  6. Normal left ventricular systolic function. No segmental  wall motion abnormalities.  7. Normal right ventricular size and function.  8. Inadequate tricuspid regurgitation Doppler envelope  precludes estimation of RVSP.  9. Normal tricuspid valve.  Mild tricuspid regurgitation.  10. Minimal pericardial effusion.  *** No previous Echo exam.  ------------------------------------------------------------------------  Confirmed on  3/7/2016 - 14:58:17 by Demi Aviles M.D.  ------------------------------------------------------------------------    < end of copied text >  [ x]  Catheterization:  < from: Cardiac Cath Lab - Adult (03.01.16 @ 13:20) >  DIAGNOSTIC IMPRESSIONS: Severe multivessel CAD  DIAGNOSTIC RECOMMENDATIONS: CTS evaluation for CABG  INTERVENTIONAL IMPRESSIONS: Severe multivessel CAD  INTERVENTIONAL RECOMMENDATIONS: CTS evaluation for CABG  Prepared and signed by    < end of copied text >    [ ] Stress Test:  	      MEDICATIONS:  MEDICATIONS  (STANDING):      FAMILY HISTORY:  No pertinent family history in first degree relatives      SOCIAL HISTORY:    [x ] Non-smoker  [ ] Smoker  [ ] Alcohol    Allergies    No Known Allergies    Intolerances    	    REVIEW OF SYSTEMS:  CONSTITUTIONAL: No fever, weight loss, or fatigue  EYES: No eye pain, visual disturbances, or discharge  ENMT:  No difficulty hearing, tinnitus, vertigo; No sinus or throat pain  NECK: No pain or stiffness  RESPIRATORY: No cough, wheezing, chills or hemoptysis; No Shortness of Breath  CARDIOVASCULAR: No chest pain, palpitations, passing out, dizziness, or leg swelling  GASTROINTESTINAL: No abdominal or epigastric pain. No nausea, vomiting, or hematemesis; No diarrhea or constipation. No melena or hematochezia.  GENITOURINARY: No dysuria, frequency, hematuria, or incontinence  NEUROLOGICAL: No headaches, memory loss, loss of strength, numbness, or tremors  SKIN: No itching, burning, rashes, or lesions   	    [ ] All others negative	  [ ] Unable to obtain    PHYSICAL EXAM:  T(C): 36.9 (10-03-18 @ 10:37), Max: 38.1 (10-03-18 @ 04:57)  HR: 55 (10-03-18 @ 10:37) (55 - 71)  BP: 118/48 (10-03-18 @ 10:37) (118/48 - 177/73)  RR: 18 (10-03-18 @ 10:37) (16 - 18)  SpO2: 100% (10-03-18 @ 10:37) (99% - 100%)  Wt(kg): --  I&O's Summary      Appearance: Normal	  Psychiatry: A & O x 3, Mood & affect appropriate  HEENT:   Normal oral mucosa, PERRL, EOMI	  Lymphatic: No lymphadenopathy  Cardiovascular: Normal S1 S2,RRR  Respiratory: fine crackles bl at base   Gastrointestinal:  Soft, Non-tender, + BS	  Skin: No rashes, No ecchymoses, No cyanosis	  Neurologic: Non-focal  Extremities: Normal range of motion, No clubbing, cyanosis or edema  Vascular: Peripheral pulses palpable 2+ bilaterally    TELEMETRY: 	    ECG:  NSR with 1st degree AVB 	@ 67 bpm  RADIOLOGY:    < from: Xray Chest 2 Views PA/Lat (10.03.18 @ 06:35) >  INTERPRETATION:     Sternotomy wires and clips from previous cardiac surgery are present.   Heart is mildly enlarged but stable. No focal consolidations. No   effusions or congestion to suggest CHF.      COMPARISON:  March 14, 2016      IMPRESSION:  Clear lungs.        ----------------------------------------------------------------------------------------------------------------------------------    OTHER: 	  < from: CT Abdomen and Pelvis w/ Oral Cont and w/ IV Cont (10.03.18 @ 08:56) >    FINDINGS:    LOWER CHEST: Bibasilar dependent atelectasis with trace pleural effusions   bilaterally, right greater than left. Cardiomegaly. Coronary artery   calcifications. Sternotomy.    LIVER: Within normal limits.  BILE DUCTS: Intrahepatic and extrahepatic biliary ductal dilatation with   the common bile duct measuring 1.4 cm, unchanged.  GALLBLADDER: Cholecystectomy.  SPLEEN: Within normal limits.  PANCREAS: 1.2 cm cyst in the tail the pancreas, unchanged. No pancreatic   ductal dilatation  ADRENALS: Within normal limits.  KIDNEYS/URETERS: Subcentimeter hypodense foci in the left kidney, too   small to categorize. No hydronephrosis.    BLADDER: Within normal limits.  REPRODUCTIVE ORGANS: The prostate is enlarged.    BOWEL: No bowel obstruction. Scattered colonic diverticulosis without   inflammatory changes. Appendix is normal.  PERITONEUM: No ascites or pneumoperitoneum.  VESSELS:  Atherosclerotic changes.  RETROPERITONEUM: No lymphadenopathy.    ABDOMINAL WALL: Within normal limits.  BONES: Degenerative changes of the spine.    IMPRESSION:    Stable appearance of the bile ducts.    Normal appendix.          < end of copied text >  	  LABS:	 	    CARDIAC MARKERS:                                  9.3    5.83  )-----------( 155      ( 03 Oct 2018 05:45 )             29.2     10-03    135  |  99  |  28<H>  ----------------------------<  58<L>  3.9   |  26  |  1.62<H>    Ca    7.5<L>      03 Oct 2018 05:45  Phos  2.2     10-03  Mg     1.8     10-03    TPro  6.1  /  Alb  2.7<L>  /  TBili  0.3  /  DBili  x   /  AST  35  /  ALT  20  /  AlkPhos  85  10-03    PT/INR - ( 03 Oct 2018 06:35 )   PT: 11.9 SEC;   INR: 1.07          PTT - ( 03 Oct 2018 06:35 )  PTT:33.1 SEC  proBNP:   Lipid Profile:   HgA1c:   TSH:

## 2018-10-03 NOTE — ED PROVIDER NOTE - MEDICAL DECISION MAKING DETAILS
Sepsis.  Intrabdominal pathology vs. endocarditis vs. UTI.  Labs, chest Xray, BCx, UCx, Ct abdomen/pelvis, Vanc, zosyn, tylenol, fluids, admit.

## 2018-10-03 NOTE — H&P ADULT - NSHPLABSRESULTS_GEN_ALL_CORE
9.3    5.83  )-----------( 155      ( 03 Oct 2018 05:45 )             29.2    10-03    135  |  99  |  28<H>  ----------------------------<  58<L>  3.9   |  26  |  1.62<H>    Ca    7.5<L>      03 Oct 2018 05:45  Phos  2.2     10-03  Mg     1.8     10-03    TPro  6.1  /  Alb  2.7<L>  /  TBili  0.3  /  DBili  x   /  AST  35  /  ALT  20  /  AlkPhos  85  10-03    < from: CT Abdomen and Pelvis w/ Oral Cont and w/ IV Cont (10.03.18 @ 08:56) >      IMPRESSION:    Stable appearance of the bile ducts.    Normal appendix.            < end of copied text >    < from: Xray Chest 2 Views PA/Lat (10.03.18 @ 06:35) >      IMPRESSION:  Clear lungs.    < end of copied text >

## 2018-10-03 NOTE — ED ADULT NURSE NOTE - OBJECTIVE STATEMENT
Sujit RN: 84yom A&ox4 amb w/ cane presents to ED 6 c/o generalized abdominal/suprapubic pain x 2 days. pt also reports pain on urination. pt reports he was placed on abx for UTI 2 weeks ago w/ no resolve of symptoms. pt noted to be febrile upon arrival, unsure if he had a fever at home. pt denies associated cp, sob, dizziness, lightheadedness, n/v/d. breathing even/unlabored. abdomen soft, nontender, nondistended. skin warm, dry, and intact. 18g iv lock placed to R ac. labs sent. report given to mahogany Kent.

## 2018-10-03 NOTE — CONSULT NOTE ADULT - ASSESSMENT
84 year old male with htn, CAD s/p CABG, post op Afib, DM, s/p  cholecystectomy presenting with abd pain/ fever     1. CAD s/p CABG  cv stable no chest pain or sob, no decomp chf on exam  resume 325 mg ASA, BB and statin     2. Afib   no further reoccurrence. remains in SR   resume BB and ASA     3. HTN   elevated BP noted   resume norvasc 10 daily, metropolol 50 mg BID    4 Abd pain   CT abd pain unremarkable, revealing unchanged pancreatic cyst.   work up per ED, GI eval?    5. Fever  UA negative, chest xray unremarkable   ct abd revealing bibsilar atelectasis with trace pleural effusions bl R >L  pending UC, BC   work up per ED     dvt ppx

## 2018-10-03 NOTE — ED PROVIDER NOTE - ATTENDING CONTRIBUTION TO CARE
Yury: I have seen and examined the patient face to face, have reviewed and addended the HPI, PE and a/p as necessary.    84 y.o. male PMH DM, CAD s/p CABG (2016), s/p Cholecystectomy p/w abdominal x 1 week noted to have fever today in ED.  Noted to have increasing weakness over the course.  Denies nausea vomiting.  Pain is suprapubic and RUQ.  Denies chest pain, dizzinesss, sob, or any new or worsening symptoms.    GEN - NAD; well appearing; A+O x3; warm skin; CARD -s1s2, RRR, no M,G,R; PULM - CTA b/l, symmetric breath sounds; ABD:  +BS, RUQ tenderness, suprapubic tenderness; BACK: no CVA tenderness, Normal  spine; EXT: symmetric pulses, 2+ dp, capillary refill < 2 seconds, no clubbing, no cyanosis, no edema     85 yo M a/w ruq pain and suprapubic tenderness, noted to have recent cdu admission for pyelo sent home on cefedinir, now here with sepsis concern for recurrent pyelo, vs bacteremia, vs abd pathology  - ct abd pelvis, chest xray, ua, urine culture, ivf, reassess. likely admit.

## 2018-10-03 NOTE — H&P ADULT - HISTORY OF PRESENT ILLNESS
This is a 83 yo male PMhx DM type 2 (insulin dependent), CAD s/pCABG (2016), HTN, BPH, recent CDU stay for ?pyelonephritis sent out on 2 weeks of ceftinir (June, 2018) presenting for 1 week of abdominal pain and now low grade fevers.  Information obtained by wife as unable to communicate properly with patient given extreme hard of hearing, either with wife as intepreter or  services.  After discharge pain improved.  However last wednesday after dinner benjaminn started to have mid abdominal pain, crampy in nature, non-radiating.  Improved with BM.  This continued daily, worsening with food and improving with BM. However a few days ago, patient startd to have pain with urination and suprapubic tenderness to palpation.  Came to ED for further evaluation.    In ED: Given vanc/zosyn, D5 for hypoglycemia.    Currently patient without abdominal pain. No fever, chills, chest pain, SOB, n/v/d/c. States intermittent cough since abdominal pain occurred. No other complaints. No immobility, recent travel, sick contacts.

## 2018-10-03 NOTE — H&P ADULT - PROBLEM SELECTOR PLAN 6
U protein from 300 to >600  -no clinical signs of nephrotic syndrome but witlh albumin 2.7 will check protein/creatinine ratio as well as lipid profile for TG

## 2018-10-03 NOTE — H&P ADULT - NSHPLANGTRANSLATORFT_GEN_A_CORE
[employer] intepreter#[id number], however patient very hard of hearing, not able to provide information, information mostly obtained by wife at bedside.

## 2018-10-03 NOTE — ED PROVIDER NOTE - PHYSICAL EXAMINATION
Gen:  NAD, alert and oriented x 3  HEENT:  oropharynx clear, sclera clear, PERRL  Heart:  RRR, Grade III/VI holosystolic murmur loudest at apex, visible PMI  Lung:  CTA b/l, no wheeze or rales  Abd:  ND, soft, Diffuse moderate tenderness without guarding or rebound, RUQ laparotomy scar.  Ext:  No edema, no joint swelling  Skin:  No rash or ecchymosis.  Feels febrile to touch.  Neuro:  Nonfocal.

## 2018-10-03 NOTE — ED ADULT TRIAGE NOTE - CHIEF COMPLAINT QUOTE
pt brought in by ems for generalized abdominal pain and weakness since yesterday. denies nausea/vomiting/diarrhea. febrile in triage. pmh hld bph dm htn

## 2018-10-03 NOTE — ED PROVIDER NOTE - OBJECTIVE STATEMENT
84 y.o. male PMH DM, CAD s/p CABG (2016), s/p Cholecystectomy p/w abdominal x 1 week.  The pain is worse today.  No vomiting, no diarrhea, is able to eat but less than normal.  He also feels generalized weakness, and today there was fever.

## 2018-10-03 NOTE — ED ADULT NURSE NOTE - NSIMPLEMENTINTERV_GEN_ALL_ED
Implemented All Fall Risk Interventions:  [place] to call system. Call bell, personal items and telephone within reach. Instruct patient to call for assistance. Room bathroom lighting operational. Non-slip footwear when patient is off stretcher. Physically safe environment: no spills, clutter or unnecessary equipment. Stretcher in lowest position, wheels locked, appropriate side rails in place. Provide visual cue, wrist band, yellow gown, etc. Monitor gait and stability. Monitor for mental status changes and reorient to person, place, and time. Review medications for side effects contributing to fall risk. Reinforce activity limits and safety measures with patient and family.

## 2018-10-03 NOTE — ED ADULT NURSE REASSESSMENT NOTE - NS ED NURSE REASSESS COMMENT FT1
Pt noted to become diaphoretic after using restroom Finger stick obtained . Pts received half of amp 0f D50 for glucose of 45. pt remains awake and alert x 3 . Pt denies any nausea or vomiting. Pts wife at bedside. will continue to monitor.

## 2018-10-03 NOTE — H&P ADULT - PROBLEM SELECTOR PLAN 2
unclear etiology  -await BCx  -UA negative, CT abdomen/pelvis negative, Xray negative for sourece  -?chronic prostatitis vs. atalectasis vs. alternative source.  -contiue to monitor  -treat with Zosyn for now for intra-abdominal source, but unsure what exactly am treating. Will treat until Bcx return.

## 2018-10-04 ENCOUNTER — TRANSCRIPTION ENCOUNTER (OUTPATIENT)
Age: 83
End: 2018-10-04

## 2018-10-04 LAB
ALBUMIN SERPL ELPH-MCNC: 2.3 G/DL — LOW (ref 3.3–5)
ALP SERPL-CCNC: 84 U/L — SIGNIFICANT CHANGE UP (ref 40–120)
ALT FLD-CCNC: 23 U/L — SIGNIFICANT CHANGE UP (ref 4–41)
AST SERPL-CCNC: 34 U/L — SIGNIFICANT CHANGE UP (ref 4–40)
BILIRUB SERPL-MCNC: 0.3 MG/DL — SIGNIFICANT CHANGE UP (ref 0.2–1.2)
BUN SERPL-MCNC: 19 MG/DL — SIGNIFICANT CHANGE UP (ref 7–23)
CALCIUM SERPL-MCNC: 7.4 MG/DL — LOW (ref 8.4–10.5)
CHLORIDE SERPL-SCNC: 100 MMOL/L — SIGNIFICANT CHANGE UP (ref 98–107)
CO2 SERPL-SCNC: 23 MMOL/L — SIGNIFICANT CHANGE UP (ref 22–31)
CREAT SERPL-MCNC: 1.81 MG/DL — HIGH (ref 0.5–1.3)
GLUCOSE BLDC GLUCOMTR-MCNC: 116 MG/DL — HIGH (ref 70–99)
GLUCOSE BLDC GLUCOMTR-MCNC: 161 MG/DL — HIGH (ref 70–99)
GLUCOSE BLDC GLUCOMTR-MCNC: 227 MG/DL — HIGH (ref 70–99)
GLUCOSE SERPL-MCNC: 202 MG/DL — HIGH (ref 70–99)
HBA1C BLD-MCNC: 6.9 % — HIGH (ref 4–5.6)
HCT VFR BLD CALC: 27.8 % — LOW (ref 39–50)
HGB BLD-MCNC: 8.9 G/DL — LOW (ref 13–17)
MAGNESIUM SERPL-MCNC: 1.7 MG/DL — SIGNIFICANT CHANGE UP (ref 1.6–2.6)
MCHC RBC-ENTMCNC: 26.3 PG — LOW (ref 27–34)
MCHC RBC-ENTMCNC: 32 % — SIGNIFICANT CHANGE UP (ref 32–36)
MCV RBC AUTO: 82.2 FL — SIGNIFICANT CHANGE UP (ref 80–100)
NRBC # FLD: 0 — SIGNIFICANT CHANGE UP
PHOSPHATE SERPL-MCNC: 3.1 MG/DL — SIGNIFICANT CHANGE UP (ref 2.5–4.5)
PLATELET # BLD AUTO: 157 K/UL — SIGNIFICANT CHANGE UP (ref 150–400)
PMV BLD: 10.3 FL — SIGNIFICANT CHANGE UP (ref 7–13)
POTASSIUM SERPL-MCNC: 3.8 MMOL/L — SIGNIFICANT CHANGE UP (ref 3.5–5.3)
POTASSIUM SERPL-SCNC: 3.8 MMOL/L — SIGNIFICANT CHANGE UP (ref 3.5–5.3)
PROT SERPL-MCNC: 5.4 G/DL — LOW (ref 6–8.3)
RBC # BLD: 3.38 M/UL — LOW (ref 4.2–5.8)
RBC # FLD: 12.9 % — SIGNIFICANT CHANGE UP (ref 10.3–14.5)
SODIUM SERPL-SCNC: 133 MMOL/L — LOW (ref 135–145)
SPECIMEN SOURCE: SIGNIFICANT CHANGE UP
SPECIMEN SOURCE: SIGNIFICANT CHANGE UP
WBC # BLD: 5.51 K/UL — SIGNIFICANT CHANGE UP (ref 3.8–10.5)
WBC # FLD AUTO: 5.51 K/UL — SIGNIFICANT CHANGE UP (ref 3.8–10.5)

## 2018-10-04 PROCEDURE — 99222 1ST HOSP IP/OBS MODERATE 55: CPT | Mod: GC

## 2018-10-04 RX ORDER — INSULIN GLARGINE 100 [IU]/ML
5 INJECTION, SOLUTION SUBCUTANEOUS AT BEDTIME
Qty: 0 | Refills: 0 | Status: DISCONTINUED | OUTPATIENT
Start: 2018-10-04 | End: 2018-10-08

## 2018-10-04 RX ADMIN — HEPARIN SODIUM 5000 UNIT(S): 5000 INJECTION INTRAVENOUS; SUBCUTANEOUS at 06:44

## 2018-10-04 RX ADMIN — INSULIN GLARGINE 5 UNIT(S): 100 INJECTION, SOLUTION SUBCUTANEOUS at 22:06

## 2018-10-04 RX ADMIN — Medication 2: at 12:37

## 2018-10-04 RX ADMIN — LISINOPRIL 40 MILLIGRAM(S): 2.5 TABLET ORAL at 06:12

## 2018-10-04 RX ADMIN — Medication 100 MILLIGRAM(S): at 12:35

## 2018-10-04 RX ADMIN — PIPERACILLIN AND TAZOBACTAM 25 GRAM(S): 4; .5 INJECTION, POWDER, LYOPHILIZED, FOR SOLUTION INTRAVENOUS at 06:12

## 2018-10-04 RX ADMIN — AMLODIPINE BESYLATE 10 MILLIGRAM(S): 2.5 TABLET ORAL at 06:11

## 2018-10-04 RX ADMIN — POLYETHYLENE GLYCOL 3350 17 GRAM(S): 17 POWDER, FOR SOLUTION ORAL at 22:07

## 2018-10-04 RX ADMIN — SENNA PLUS 2 TABLET(S): 8.6 TABLET ORAL at 22:06

## 2018-10-04 RX ADMIN — PIPERACILLIN AND TAZOBACTAM 25 GRAM(S): 4; .5 INJECTION, POWDER, LYOPHILIZED, FOR SOLUTION INTRAVENOUS at 14:09

## 2018-10-04 RX ADMIN — FINASTERIDE 5 MILLIGRAM(S): 5 TABLET, FILM COATED ORAL at 12:35

## 2018-10-04 RX ADMIN — HEPARIN SODIUM 5000 UNIT(S): 5000 INJECTION INTRAVENOUS; SUBCUTANEOUS at 22:16

## 2018-10-04 RX ADMIN — TAMSULOSIN HYDROCHLORIDE 0.4 MILLIGRAM(S): 0.4 CAPSULE ORAL at 22:06

## 2018-10-04 RX ADMIN — Medication 325 MILLIGRAM(S): at 12:34

## 2018-10-04 RX ADMIN — HEPARIN SODIUM 5000 UNIT(S): 5000 INJECTION INTRAVENOUS; SUBCUTANEOUS at 14:09

## 2018-10-04 RX ADMIN — Medication 650 MILLIGRAM(S): at 06:11

## 2018-10-04 RX ADMIN — Medication 50 MILLIGRAM(S): at 06:12

## 2018-10-04 RX ADMIN — PIPERACILLIN AND TAZOBACTAM 25 GRAM(S): 4; .5 INJECTION, POWDER, LYOPHILIZED, FOR SOLUTION INTRAVENOUS at 22:07

## 2018-10-04 NOTE — CONSULT NOTE ADULT - ASSESSMENT
This is a 85 y/o male PMhx DM type 2 (insulin dependent), CAD s/p CABG (2016), HTN, BPH, recent CDU stay for ?pyelonephritis sent out on 2 weeks of Cefdinir (June, 2018) This is a 83 y/o male PMhx DM type 2 (insulin dependent), CAD s/p CABG (2016), HTN, BPH, recent CDU stay for ?pyelonephritis sent out on 2 weeks of Cefdinir (June, 2018) presented with fever, dysuria and suprapubic pain, TAINA, UA negative, CT abdomen with no acute process, blood cx negative. 85 yo m with DM type 2 (insulin dependent), CAD s/pCABG (2016), HTN, BPH, recent CDU stay for ?pyelonephritis sent out on 2 weeks of cefdinir (June, 2018) presenting for 1 day of abdominal pain, epigastric, denied dysuria. Denied fever until he got to ED, denied cough, chest pain, SOB, diarrhea, melena. No localizing symptoms except for abdominal pain that has now resolved. Labs significant for anemia and TAINA.   UA negative, CT abdomen w contrast showing no acute process, blood cx negative.  H/o gardening, no bites. No travel.       Epigastric abdominal pain, anemia - ?Occult GI bleed, ? gastroenteritis , ? diverticulitis    Suggest:  - Check GI PCR  - Check Stool guaiac  - If blood cx remain negative, will consider discontinuing Zosyn. 83 yo m with DM type 2 (insulin dependent), CAD s/pCABG (2016), HTN, BPH, recent CDU stay for ?pyelonephritis sent out on 2 weeks of cefdinir (June, 2018) presenting for 1 day of abdominal pain, epigastric, denied dysuria. Denied fever until he got to ED, denied cough, chest pain, SOB, diarrhea, melena. No localizing symptoms except for abdominal pain that has now resolved. Labs significant for anemia and TAINA.   UA negative, CT abdomen w contrast showing no acute process, blood cx negative.  H/o gardening, no bites. No travel.       Epigastric abdominal pain, anemia - ?Occult GI bleed, ? gastroenteritis , ? diverticulitis    Suggest:  - Check GI PCR  - Check Stool guaiac  - If blood cx remain negative, will consider discontinuing Zosyn.     D/W primary team

## 2018-10-04 NOTE — DISCHARGE NOTE ADULT - HOSPITAL COURSE
83 yo male PMhx DM type 2 (insulin dependent), CAD s/pCABG (2016), HTN, BPH, recent CDU stay for ?pyelonephritis sent out on 2 weeks of ceftinir (June, 2018) presenting for 1 week of abdominal pain and now low grade fevers.      COURSE___ 85 yo PMHx as above admitted with abdominal pain, fevers found to have TAINA. CT Abd: No source of infection   Type 2 DM with incident of hypoglycemic likely in setting of poor PO intake, insulin adjusted   CKD -evaluated by renal- avoid nephrotoxic agents, continue to monitor.   Essential hypertension controlled on metoprolol.  Lisinopril held during hospital stay due to TAINA 85 yo PMHx as above admitted with abdominal pain, fevers found to have TAINA. CT Abd: No source of infection   Type 2 DM with incident of hypoglycemic likely in setting of poor PO intake, insulin adjusted   CKD -evaluated by renal- avoid nephrotoxic agents, continue to monitor.   Essential hypertension controlled on metoprolol.  Lisinopril held during hospital stay due to TAINA  Pt is optimized for discharge to rehab

## 2018-10-04 NOTE — PROGRESS NOTE ADULT - ASSESSMENT
· Assessment	  85 yo PMHx as above admitted with abdominal pain, fevers     Problem/Plan - 1:  ·  Problem: Generalized abdominal pain.  Plan: unclear abdominal pain.   -patient with fever and abdominal pain history, negative hanna sign, no abdominal pain, guarding or rigidity.  - CT Abd: No source of infection     Problem/Plan - 2:  ·  Problem: Fever, unspecified fever cause.  Plan: unclear etiology  -await BCx  - ID f/up noted.  -IV Zosyn     Problem/Plan - 3:  ·  Problem: Type 2 diabetes mellitus without complication, with long-term current use of insulin.  Plan: hypoglycemic todayl ikely in setting of poor PO intake without adjustment of insulin.  - FSSS     Problem/Plan - 4:  ·  Problem: CKD (chronic kidney disease), stage III.  Plan: avoid nephrotoxic agents, continue to monitor.      Problem/Plan - 5:  ·  Problem: Essential hypertension.  Plan:  metoprolol, lisinopril.      Problem/Plan - 6:  Problem: Persistent proteinuria. Plan: U protein from 300 to >600  -no clinical signs of nephrotic syndrome but witlh albumin 2.7   Nephro eval

## 2018-10-04 NOTE — DISCHARGE NOTE ADULT - MEDICATION SUMMARY - MEDICATIONS TO STOP TAKING
I will STOP taking the medications listed below when I get home from the hospital:    metFORMIN 1000 mg oral tablet  -- 1 tab(s) by mouth 2 times a day (last filled in feb/2018 for 3 months supplyy)    lisinopril 40 mg oral tablet  -- 1 tab(s) by mouth once a day (last filled in June/2018 for 3 mos supply)

## 2018-10-04 NOTE — DISCHARGE NOTE ADULT - SECONDARY DIAGNOSIS.
Generalized abdominal pain Persistent proteinuria Type 2 diabetes mellitus without complication, with long-term current use of insulin CKD (chronic kidney disease), stage III Essential hypertension

## 2018-10-04 NOTE — DISCHARGE NOTE ADULT - CARE PROVIDER_API CALL
Jair Villarreal (MD), Cardiovascular Disease; Internal Medicine; Interventional Cardiology; Nuclear Cardiology  [address] [facility] [address]  [place] [address]  Phone: [phone]  Fax: [phone] Jair Villarreal), Cardiovascular Disease; Internal Medicine; Interventional Cardiology; Nuclear Cardiology  [address] [facility]  [place] [address]  Phone: [phone]  Fax: [phone]    Zora Combs), Internal Medicine; Nephrology  [address] [place] [address]  Phone: [phone]  Fax: [phone]

## 2018-10-04 NOTE — DISCHARGE NOTE ADULT - PLAN OF CARE
Resolution and return to baseline Likely secondary to________ Infectious disease was consulted_____ Continue your medication regimen and a consistent carbohydrate diet (Meaning eating the same amount of carbohydrates at the same time each day). Monitor blood glucose levels throughout the day before meals and at bedtime. Record blood sugars and bring to outpatient providers appointment in order to be reviewed by your doctor for management modifications. Monitor for signs/symptoms of low blood glucose, such as, dizziness, altered mental status, or cool/clammy skin. In addition, monitor for signs/symptoms of high blood glucose, such as, feeling hot, dry, fatigued, or with increased thirst/urination. Make regular podiatry appointments in order to have feet checked for wounds and uncontrolled toe nail growth to prevent infections, as well as, appointments with an ophthalmologist to monitor your vision. In order to prevent further disease progression, continue to follow recommendations made by your primary provider/nephrologist. Continue a diet that is low in sodium and avoid foods that are concentrated in potassium and phosphorus. Continue your medications/supplementations as directed and avoid over-the-counter drugs that are harmful to kidneys, such as, Non-Steroidal Anti-Inflammatory Drugs (NSAIDs). Follow-up as outpatient to monitor your kidney function, as well as, vitamin D, Calcium, potassium, and phosphorus levels. Continue blood pressure medication regimen as directed. Monitor for any visual changes, headaches or dizziness.  Monitor blood pressure regularly.  Follow up with your PCP for further management for high blood pressure. symptoms control further management Glucose control stable renal function BP control Continue blood pressure medication metoprolol as prescribed. Lisinopril held during hospital stay due to TAINA      Monitor for any visual changes, headaches or dizziness.  Monitor blood pressure regularly.  Follow up with your PCP for further management for high blood pressure. Infectious disease was consulted.  CT Abdomen done: No source of infection resolved Follow up with PCP in 1 week to discuss further plan

## 2018-10-04 NOTE — CONSULT NOTE ADULT - ATTENDING COMMENTS
.agree with the NP note above  pt well known to our office  PMH includes HTN, DM, CAD s/p CABG (2016), and post op Afib p/w abdominal pain and low grade fever  GI consult Dr Ball    CV stable  fever workup per med
Fever 102 following sudden onset mid- abdominal crampy pain.    Anemia.  Encephalopathy.    stool GI PCR  guaiac stool  West Nile serology IgM and IgG  Would continue Zosyn for now.

## 2018-10-04 NOTE — PROGRESS NOTE ADULT - SUBJECTIVE AND OBJECTIVE BOX
CARDIOLOGY FOLLOW UP - Dr. Villarreal    CC no chest pain sob or abd pain       PHYSICAL EXAM:  T(C): 39.3 (10-04-18 @ 06:06), Max: 39.3 (10-04-18 @ 06:06)  HR: 78 (10-04-18 @ 06:06) (54 - 79)  BP: 151/51 (10-04-18 @ 06:06) (144/74 - 155/61)  RR: 18 (10-04-18 @ 06:06) (16 - 18)  SpO2: 95% (10-04-18 @ 06:06) (95% - 100%)  Wt(kg): --  I&O's Summary      Appearance: Normal	  Cardiovascular: Normal S1 S2,RRR, No JVD, No murmurs  Respiratory: Lungs clear to auscultation	  Gastrointestinal:  Soft, Non-tender, + BS	  Extremities: Normal range of motion, No clubbing, cyanosis or edema        MEDICATIONS  (STANDING):  amLODIPine   Tablet 10 milliGRAM(s) Oral daily  aspirin enteric coated 325 milliGRAM(s) Oral daily  dextrose 5% + sodium chloride 0.45%. 1000 milliLiter(s) (50 mL/Hr) IV Continuous <Continuous>  dextrose 5%. 1000 milliLiter(s) (50 mL/Hr) IV Continuous <Continuous>  dextrose 50% Injectable 12.5 Gram(s) IV Push once  dextrose 50% Injectable 25 Gram(s) IV Push once  dextrose 50% Injectable 25 Gram(s) IV Push once  docusate sodium 100 milliGRAM(s) Oral daily  finasteride 5 milliGRAM(s) Oral daily  heparin  Injectable 5000 Unit(s) SubCutaneous every 8 hours  influenza   Vaccine 0.5 milliLiter(s) IntraMuscular once  insulin lispro (HumaLOG) corrective regimen sliding scale   SubCutaneous three times a day before meals  insulin lispro (HumaLOG) corrective regimen sliding scale   SubCutaneous at bedtime  lisinopril 40 milliGRAM(s) Oral daily  metoprolol succinate ER 50 milliGRAM(s) Oral daily  piperacillin/tazobactam IVPB. 3.375 Gram(s) IV Intermittent every 8 hours  polyethylene glycol 3350 17 Gram(s) Oral daily  senna 2 Tablet(s) Oral at bedtime  tamsulosin 0.4 milliGRAM(s) Oral at bedtime      TELEMETRY: 	    ECG:  	  RADIOLOGY:   DIAGNOSTIC TESTING:  [ ] Echocardiogram:  [ ]  Catheterization:  [ ] Stress Test:    OTHER: 	    LABS:	 	                                9.3    5.83  )-----------( 155      ( 03 Oct 2018 05:45 )             29.2     10-03    135  |  99  |  28<H>  ----------------------------<  58<L>  3.9   |  26  |  1.62<H>    Ca    7.5<L>      03 Oct 2018 05:45  Phos  2.2     10-03  Mg     1.8     10-03    TPro  6.1  /  Alb  2.7<L>  /  TBili  0.3  /  DBili  x   /  AST  35  /  ALT  20  /  AlkPhos  85  10-03    PT/INR - ( 03 Oct 2018 06:35 )   PT: 11.9 SEC;   INR: 1.07          PTT - ( 03 Oct 2018 06:35 )  PTT:33.1 SEC

## 2018-10-04 NOTE — DISCHARGE NOTE ADULT - MEDICATION SUMMARY - MEDICATIONS TO TAKE
I will START or STAY ON the medications listed below when I get home from the hospital:    finasteride 5 mg oral tablet  -- 1 tab(s) by mouth once a day  -- Indication: For BPH    aspirin 325 mg oral delayed release tablet  -- 1 tab(s) by mouth once a day  -- Indication: For Prophylaxis     tamsulosin 0.4 mg oral capsule  -- 1 cap(s) by mouth once a day  -- Indication: For BPH     insulin glargine  -- 6 unit(s) subcutaneous once a day (at bedtime)  -- Indication: For Type 2 diabetes mellitus without complication, with long-term current use of insulin    metoprolol succinate 50 mg oral tablet, extended release  -- 1 tab(s) by mouth once a day  -- Indication: For Essential hypertension    amLODIPine 10 mg oral tablet  -- 1 tab(s) by mouth once a day  -- Indication: For Essential hypertension    senna oral tablet  -- 2 tab(s) by mouth once a day (at bedtime)  -- Indication: For Prophylaxis     docusate sodium 100 mg oral capsule  -- 1 cap(s) by mouth once a day  -- Indication: For Prophylaxis     polyethylene glycol 3350 oral powder for reconstitution  -- 17 gram(s) by mouth once a day  -- Indication: For Prophylaxis

## 2018-10-04 NOTE — DISCHARGE NOTE ADULT - PATIENT PORTAL LINK FT
You can access the [facility] Patient Portal, offered by [facility], by registering with the following website: [email or web address]

## 2018-10-04 NOTE — CONSULT NOTE ADULT - SUBJECTIVE AND OBJECTIVE BOX
Patient is a 84y old  Male who presents with a chief complaint of abdominal pain (04 Oct 2018 11:03)      HPI:  This is a 85 yo male PMhx DM type 2 (insulin dependent), CAD s/pCABG (), HTN, BPH, recent CDU stay for ?pyelonephritis sent out on 2 weeks of ceftinir () presenting for 1 week of abdominal pain and now low grade fevers.  Information obtained by wife as unable to communicate properly with patient given extreme hard of hearing, either with wife as intepreter or  services.  After discharge pain improved.  However last wednesday after dinner patietn started to have mid abdominal pain, crampy in nature, non-radiating.  Improved with BM.  This continued daily, worsening with food and improving with BM. However a few days ago, patient startd to have pain with urination and suprapubic tenderness to palpation.  Came to ED for further evaluation.    In ED: Given vanc/zosyn, D5 for hypoglycemia.    Currently patient without abdominal pain. No fever, chills, chest pain, SOB, n/v/d/c. States intermittent cough since abdominal pain occurred. No other complaints. No immobility, recent travel, sick contacts. (03 Oct 2018 16:05)      PAST MEDICAL & SURGICAL HISTORY:  3-vessel coronary artery disease  HTN (hypertension)  DM (diabetes mellitus)  S/P cholecystectomy      Allergies  No Known Allergies    ANTIMICROBIALS:  piperacillin/tazobactam IVPB. 3.375 every 8 hours      OTHER MEDS: MEDICATIONS  (STANDING):  acetaminophen   Tablet .. 650 every 6 hours PRN  amLODIPine   Tablet 10 daily  aspirin enteric coated 325 daily  dextrose 40% Gel 15 once PRN  dextrose 50% Injectable 12.5 once  dextrose 50% Injectable 25 once  dextrose 50% Injectable 25 once  docusate sodium 100 daily  finasteride 5 daily  glucagon  Injectable 1 once PRN  heparin  Injectable 5000 every 8 hours  influenza   Vaccine 0.5 once  insulin lispro (HumaLOG) corrective regimen sliding scale  three times a day before meals  insulin lispro (HumaLOG) corrective regimen sliding scale  at bedtime  lisinopril 40 daily  metoprolol succinate ER 50 daily  polyethylene glycol 3350 17 daily  senna 2 at bedtime  tamsulosin 0.4 at bedtime      SOCIAL HISTORY:       FAMILY HISTORY:  No pertinent family history in first degree relatives      REVIEW OF SYSTEMS  [  ] ROS unobtainable because:    [  ] All other systems negative except as noted below:	    Constitutional:  [ ] fever [ ] chills  [ ] weight loss  [ ] weakness  Skin:  [ ] rash [ ] phlebitis	  Eyes: [ ] icterus [ ] pain  [ ] discharge	  ENMT: [ ] sore throat  [ ] thrush [ ] ulcers [ ] exudates  Respiratory: [ ] dyspnea [ ] hemoptysis [ ] cough [ ] sputum	  Cardiovascular:  [ ] chest pain [ ] palpitations [ ] edema	  Gastrointestinal:  [ ] nausea [ ] vomiting [ ] diarrhea [ ] constipation [ ] pain	  Genitourinary:  [ ] dysuria [ ] frequency [ ] hematuria [ ] discharge [ ] flank pain  [ ] incontinence  Musculoskeletal:  [ ] myalgias [ ] arthralgias [ ] arthritis  [ ] back pain  Neurological:  [ ] headache [ ] seizures  [ ] confusion/altered mental status  Psychiatric:  [ ] anxiety [ ] depression	  Hematology/Lymphatics:  [ ] lymphadenopathy  Endocrine:  [ ] adrenal [ ] thyroid  Allergic/Immunologic:	 [ ] transplant [ ] seasonal    Vital Signs Last 24 Hrs  T(F): 97.8 (10-04-18 @ 13:47), Max: 102.8 (10-04-18 @ 06:06)    Vital Signs Last 24 Hrs  HR: 79 (10-04-18 @ 13:47) (67 - 79)  BP: 110/53 (10-04-18 @ 13:47) (110/53 - 151/51)  RR: 18 (10-04-18 @ 13:47)  SpO2: 96% (10-04-18 @ 13:47) (95% - 98%)  Wt(kg): --    PHYSICAL EXAM:  General: non-toxic  HEAD/EYES: anicteric, PERRL  ENT:  supple  Cardiovascular:   S1, S2  Respiratory:  clear bilaterally  GI:  soft, non-tender, normal bowel sounds  :  no CVA tenderness   Musculoskeletal:  no synovitis  Neurologic:  grossly non-focal  Skin:  no rash  Lymph: no lymphadenopathy  Psychiatric:  appropriate affect  Vascular:  no phlebitis                                8.9    5.51  )-----------( 157      ( 04 Oct 2018 12:56 )             27.8       10-04    133<L>  |  100  |  19  ----------------------------<  202<H>  3.8   |  23  |  1.81<H>    Ca    7.4<L>      04 Oct 2018 12:56  Phos  3.1     10-  Mg     1.7     10-    TPro  5.4<L>  /  Alb  2.3<L>  /  TBili  0.3  /  DBili  x   /  AST  34  /  ALT  23  /  AlkPhos  84  10-      Urinalysis Basic - ( 03 Oct 2018 08:39 )    Color: YELLOW / Appearance: CLEAR / S.022 / pH: 6.5  Gluc: NEGATIVE / Ketone: NEGATIVE  / Bili: NEGATIVE / Urobili: TRACE   Blood: SMALL / Protein: >600 / Nitrite: NEGATIVE   Leuk Esterase: NEGATIVE / RBC: 5-10 / WBC 0-2   Sq Epi: FEW / Non Sq Epi: x / Bacteria: NEGATIVE        MICROBIOLOGY:    Culture - Blood (collected 10-03-18 @ 06:20)  Source: BLOOD VENOUS  Preliminary Report (10-04-18 @ 06:20):    NO ORGANISMS ISOLATED    NO ORGANISMS ISOLATED AT 24 HOURS    Culture - Blood (collected 10-03-18 @ 06:20)  Source: BLOOD PERIPHERAL  Preliminary Report (10-04-18 @ 06:20):    NO ORGANISMS ISOLATED    NO ORGANISMS ISOLATED AT 24 HOURS        RADIOLOGY:  < from: CT Abdomen and Pelvis w/ Oral Cont and w/ IV Cont (10.03.18 @ 08:56) >    IMPRESSION:    Stable appearance of the bile ducts.    Normal appendix.    < end of copied text >  < from: CT Abdomen and Pelvis w/ Oral Cont and w/ IV Cont (10.03.18 @ 08:56) > Patient is a 84y old  Male who presents with a chief complaint of abdominal pain (04 Oct 2018 11:03)      HPI:  This is a 83 yo male PMhx DM type 2 (insulin dependent), CAD s/pCABG (), HTN, BPH, recent CDU stay for ?pyelonephritis sent out on 2 weeks of ceftinir () presenting for 1 day of abdominal pain, epigastric, denied dysuria.   Denied fever until he got to ED, denied cough, chest pain, SOB, diarrhea, victor hugo. No localizing symptoms except for abdominal pain that has resolved.   In ED: Given vanc/zosyn, D5 for hypoglycemia.    Currently patient without abdominal pain. No fever, chills, chest pain, SOB, n/v/d/c. States intermittent cough since abdominal pain occurred. No other complaints. No immobility, recent travel, sick contacts. (03 Oct 2018 16:05)      PAST MEDICAL & SURGICAL HISTORY:  3-vessel coronary artery disease  HTN (hypertension)  DM (diabetes mellitus)  S/P cholecystectomy      Allergies  No Known Allergies    ANTIMICROBIALS:  piperacillin/tazobactam IVPB. 3.375 every 8 hours      OTHER MEDS: MEDICATIONS  (STANDING):  acetaminophen   Tablet .. 650 every 6 hours PRN  amLODIPine   Tablet 10 daily  aspirin enteric coated 325 daily  dextrose 40% Gel 15 once PRN  dextrose 50% Injectable 12.5 once  dextrose 50% Injectable 25 once  dextrose 50% Injectable 25 once  docusate sodium 100 daily  finasteride 5 daily  glucagon  Injectable 1 once PRN  heparin  Injectable 5000 every 8 hours  influenza   Vaccine 0.5 once  insulin lispro (HumaLOG) corrective regimen sliding scale  three times a day before meals  insulin lispro (HumaLOG) corrective regimen sliding scale  at bedtime  lisinopril 40 daily  metoprolol succinate ER 50 daily  polyethylene glycol 3350 17 daily  senna 2 at bedtime  tamsulosin 0.4 at bedtime      SOCIAL HISTORY:  Lives with wife, no smoking, no alcohol use  Does gardening everyday     FAMILY HISTORY:  Patient or wife are not aware of disease that run in patient's family.       REVIEW OF SYSTEMS  [  ] ROS unobtainable because:    [ x ] All other systems negative except as noted below:	Per HPI    Constitutional:  [ ] fever [ ] chills  [ ] weight loss  [ ] weakness  Skin:  [ ] rash [ ] phlebitis	  Eyes: [ ] icterus [ ] pain  [ ] discharge	  ENMT: [ ] sore throat  [ ] thrush [ ] ulcers [ ] exudates  Respiratory: [ ] dyspnea [ ] hemoptysis [ ] cough [ ] sputum	  Cardiovascular:  [ ] chest pain [ ] palpitations [ ] edema	  Gastrointestinal:  [ ] nausea [ ] vomiting [ ] diarrhea [ ] constipation [ ] pain	  Genitourinary:  [ ] dysuria [ ] frequency [ ] hematuria [ ] discharge [ ] flank pain  [ ] incontinence  Musculoskeletal:  [ ] myalgias [ ] arthralgias [ ] arthritis  [ ] back pain  Neurological:  [ ] headache [ ] seizures  [ ] confusion/altered mental status  Psychiatric:  [ ] anxiety [ ] depression	  Hematology/Lymphatics:  [ ] lymphadenopathy  Endocrine:  [ ] adrenal [ ] thyroid  Allergic/Immunologic:	 [ ] transplant [ ] seasonal    Vital Signs Last 24 Hrs  T(F): 97.8 (10-04-18 @ 13:47), Max: 102.8 (10-04-18 @ 06:06)    Vital Signs Last 24 Hrs  HR: 79 (10-04-18 @ 13:47) (67 - 79)  BP: 110/53 (10-04-18 @ 13:47) (110/53 - 151/51)  RR: 18 (10-04-18 @ 13:47)  SpO2: 96% (10-04-18 @ 13:47) (95% - 98%)  Wt(kg): --    PHYSICAL EXAM:  General: non-toxic  HEAD/EYES: anicteric, PERRL  ENT:  supple, Hard of hearing   Cardiovascular:   S1, S2  Respiratory:  clear bilaterally  GI:  soft, non-tender, normal bowel sounds  :  no CVA tenderness   Musculoskeletal:  no synovitis  Neurologic:  grossly non-focal  Skin:  no rash  Lymph: no lymphadenopathy  Psychiatric:  appropriate affect  Vascular:  no phlebitis                            8.9    5.51  )-----------( 157      ( 04 Oct 2018 12:56 )             27.8       10-04    133<L>  |  100  |  19  ----------------------------<  202<H>  3.8   |  23  |  1.81<H>    Ca    7.4<L>      04 Oct 2018 12:56  Phos  3.1     10-04  Mg     1.7     10-    TPro  5.4<L>  /  Alb  2.3<L>  /  TBili  0.3  /  DBili  x   /  AST  34  /  ALT  23  /  AlkPhos  84  10-04      Urinalysis Basic - ( 03 Oct 2018 08:39 )    Color: YELLOW / Appearance: CLEAR / S.022 / pH: 6.5  Gluc: NEGATIVE / Ketone: NEGATIVE  / Bili: NEGATIVE / Urobili: TRACE   Blood: SMALL / Protein: >600 / Nitrite: NEGATIVE   Leuk Esterase: NEGATIVE / RBC: 5-10 / WBC 0-2   Sq Epi: FEW / Non Sq Epi: x / Bacteria: NEGATIVE        MICROBIOLOGY:    Culture - Blood (collected 10-03-18 @ 06:20)  Source: BLOOD VENOUS  Preliminary Report (10-04-18 @ 06:20):    NO ORGANISMS ISOLATED    NO ORGANISMS ISOLATED AT 24 HOURS    Culture - Blood (collected 10-03-18 @ 06:20)  Source: BLOOD PERIPHERAL  Preliminary Report (10-04-18 @ 06:20):    NO ORGANISMS ISOLATED    NO ORGANISMS ISOLATED AT 24 HOURS        RADIOLOGY:  < from: CT Abdomen and Pelvis w/ Oral Cont and w/ IV Cont (10.03.18 @ 08:56) >    IMPRESSION:    Stable appearance of the bile ducts.    Normal appendix.    < end of copied text >  < from: CT Abdomen and Pelvis w/ Oral Cont and w/ IV Cont (10.03.18 @ 08:56) > Patient is a 84y old  Male who presents with a chief complaint of abdominal pain (04 Oct 2018 11:03)      HPI:  This is a 85 yo male PMhx DM type 2 (insulin dependent), CAD s/pCABG (), HTN, BPH, recent CDU stay for ?pyelonephritis sent out on 2 weeks of ceftin () presenting for 1 day of abdominal pain, epigastric, denied dysuria.   Denied fever until he got to ED, denied cough, chest pain, SOB, diarrhea, victor hugo. No localizing symptoms except for abdominal pain that has resolved.   In ED: Given vanc/zosyn, D5 for hypoglycemia.    Currently patient without abdominal pain. No fever, chills, chest pain, SOB, n/v/d/c. States intermittent cough since abdominal pain occurred. No other complaints. No immobility, recent travel, sick contacts. (03 Oct 2018 16:05)      PAST MEDICAL & SURGICAL HISTORY:  3-vessel coronary artery disease  HTN (hypertension)  DM (diabetes mellitus)  S/P cholecystectomy      Allergies  No Known Allergies    ANTIMICROBIALS:  piperacillin/tazobactam IVPB. 3.375 every 8 hours      OTHER MEDS: MEDICATIONS  (STANDING):  acetaminophen   Tablet .. 650 every 6 hours PRN  amLODIPine   Tablet 10 daily  aspirin enteric coated 325 daily  dextrose 40% Gel 15 once PRN  dextrose 50% Injectable 12.5 once  dextrose 50% Injectable 25 once  dextrose 50% Injectable 25 once  docusate sodium 100 daily  finasteride 5 daily  glucagon  Injectable 1 once PRN  heparin  Injectable 5000 every 8 hours  influenza   Vaccine 0.5 once  insulin lispro (HumaLOG) corrective regimen sliding scale  three times a day before meals  insulin lispro (HumaLOG) corrective regimen sliding scale  at bedtime  lisinopril 40 daily  metoprolol succinate ER 50 daily  polyethylene glycol 3350 17 daily  senna 2 at bedtime  tamsulosin 0.4 at bedtime      SOCIAL HISTORY:  Lives with wife, no smoking, no alcohol use  Does gardening everyday     FAMILY HISTORY:  Patient or wife are not aware of disease that run in patient's family.       REVIEW OF SYSTEMS  [  ] ROS unobtainable because:    [ x ] All other systems negative except as noted below:	Per HPI    Constitutional:  [ ] fever [ ] chills  [ ] weight loss  [ ] weakness  Skin:  [ ] rash [ ] phlebitis	  Eyes: [ ] icterus [ ] pain  [ ] discharge	  ENMT: [ ] sore throat  [ ] thrush [ ] ulcers [ ] exudates  Respiratory: [ ] dyspnea [ ] hemoptysis [ ] cough [ ] sputum	  Cardiovascular:  [ ] chest pain [ ] palpitations [ ] edema	  Gastrointestinal:  [ ] nausea [ ] vomiting [ ] diarrhea [ ] constipation [ ] pain	  Genitourinary:  [ ] dysuria [ ] frequency [ ] hematuria [ ] discharge [ ] flank pain  [ ] incontinence  Musculoskeletal:  [ ] myalgias [ ] arthralgias [ ] arthritis  [ ] back pain  Neurological:  [ ] headache [ ] seizures  [ ] confusion/altered mental status  Psychiatric:  [ ] anxiety [ ] depression	  Hematology/Lymphatics:  [ ] lymphadenopathy  Endocrine:  [ ] adrenal [ ] thyroid  Allergic/Immunologic:	 [ ] transplant [ ] seasonal    Vital Signs Last 24 Hrs  T(F): 97.8 (10-04-18 @ 13:47), Max: 102.8 (10-04-18 @ 06:06)    Vital Signs Last 24 Hrs  HR: 79 (10-04-18 @ 13:47) (67 - 79)  BP: 110/53 (10-04-18 @ 13:47) (110/53 - 151/51)  RR: 18 (10-04-18 @ 13:47)  SpO2: 96% (10-04-18 @ 13:47) (95% - 98%)  Wt(kg): --    PHYSICAL EXAM:  General: non-toxic  HEAD/EYES: anicteric, PERRL  ENT:  supple, Hard of hearing   Cardiovascular:   S1, S2  Respiratory:  clear bilaterally  GI:  soft, non-tender, normal bowel sounds  :  no CVA tenderness   Musculoskeletal:  no synovitis  Neurologic:  grossly non-focal  Skin:  no rash  Lymph: no lymphadenopathy  Psychiatric:  appropriate affect  Vascular:  no phlebitis                            8.9    5.51  )-----------( 157      ( 04 Oct 2018 12:56 )             27.8       10-04    133<L>  |  100  |  19  ----------------------------<  202<H>  3.8   |  23  |  1.81<H>    Ca    7.4<L>      04 Oct 2018 12:56  Phos  3.1     10-04  Mg     1.7     10-    TPro  5.4<L>  /  Alb  2.3<L>  /  TBili  0.3  /  DBili  x   /  AST  34  /  ALT  23  /  AlkPhos  84  10-04      Urinalysis Basic - ( 03 Oct 2018 08:39 )    Color: YELLOW / Appearance: CLEAR / S.022 / pH: 6.5  Gluc: NEGATIVE / Ketone: NEGATIVE  / Bili: NEGATIVE / Urobili: TRACE   Blood: SMALL / Protein: >600 / Nitrite: NEGATIVE   Leuk Esterase: NEGATIVE / RBC: 5-10 / WBC 0-2   Sq Epi: FEW / Non Sq Epi: x / Bacteria: NEGATIVE        MICROBIOLOGY:    Culture - Blood (collected 10-03-18 @ 06:20)  Source: BLOOD VENOUS  Preliminary Report (10-04-18 @ 06:20):    NO ORGANISMS ISOLATED    NO ORGANISMS ISOLATED AT 24 HOURS    Culture - Blood (collected 10-03-18 @ 06:20)  Source: BLOOD PERIPHERAL  Preliminary Report (10-04-18 @ 06:20):    NO ORGANISMS ISOLATED    NO ORGANISMS ISOLATED AT 24 HOURS        RADIOLOGY:  < from: CT Abdomen and Pelvis w/ Oral Cont and w/ IV Cont (10.03.18 @ 08:56) >    IMPRESSION:    Stable appearance of the bile ducts.    Normal appendix.    < end of copied text >  < from: CT Abdomen and Pelvis w/ Oral Cont and w/ IV Cont (10.03.18 @ 08:56) >

## 2018-10-04 NOTE — PROGRESS NOTE ADULT - ASSESSMENT
84 year old male with htn, CAD s/p CABG, post op Afib, DM, s/p  cholecystectomy presenting with abd pain/ fever     1. CAD s/p CABG  cv stable no chest pain or sob, no decomp chf on exam  continue 325 mg ASA, BB and statin     2. Afib   no further reoccurrence. remains in SR   continue BB and ASA     3. HTN   sys bp acceptable, continue with current anti-htn meds     4 Abd pain   CT abd pain unremarkable, revealing unchanged pancreatic cyst.   med f/u, gi eval ?     5. Fever  UA negative, chest xray unremarkable   ct abd noted   BC negative , med f/u     dvt ppx

## 2018-10-04 NOTE — PROGRESS NOTE ADULT - SUBJECTIVE AND OBJECTIVE BOX
Patient is a 84y old  Male who presents with a chief complaint of abdominal pain (04 Oct 2018 18:23)      SUBJECTIVE / OVERNIGHT EVENTS:   Feels better.  Denies CP/SOB/Palpitation/HA.    MEDICATIONS  (STANDING):  amLODIPine   Tablet 10 milliGRAM(s) Oral daily  aspirin enteric coated 325 milliGRAM(s) Oral daily  dextrose 5%. 1000 milliLiter(s) (50 mL/Hr) IV Continuous <Continuous>  dextrose 50% Injectable 12.5 Gram(s) IV Push once  dextrose 50% Injectable 25 Gram(s) IV Push once  dextrose 50% Injectable 25 Gram(s) IV Push once  docusate sodium 100 milliGRAM(s) Oral daily  finasteride 5 milliGRAM(s) Oral daily  heparin  Injectable 5000 Unit(s) SubCutaneous every 8 hours  influenza   Vaccine 0.5 milliLiter(s) IntraMuscular once  insulin glargine Injectable (LANTUS) 5 Unit(s) SubCutaneous at bedtime  insulin lispro (HumaLOG) corrective regimen sliding scale   SubCutaneous three times a day before meals  insulin lispro (HumaLOG) corrective regimen sliding scale   SubCutaneous at bedtime  lisinopril 40 milliGRAM(s) Oral daily  metoprolol succinate ER 50 milliGRAM(s) Oral daily  piperacillin/tazobactam IVPB. 3.375 Gram(s) IV Intermittent every 8 hours  polyethylene glycol 3350 17 Gram(s) Oral daily  senna 2 Tablet(s) Oral at bedtime  tamsulosin 0.4 milliGRAM(s) Oral at bedtime    MEDICATIONS  (PRN):  acetaminophen   Tablet .. 650 milliGRAM(s) Oral every 6 hours PRN Temp greater or equal to 38C (100.4F), Moderate Pain (4 - 6)  dextrose 40% Gel 15 Gram(s) Oral once PRN Blood Glucose LESS THAN 70 milliGRAM(s)/deciliter  glucagon  Injectable 1 milliGRAM(s) IntraMuscular once PRN Glucose LESS THAN 70 milligrams/deciliter        CAPILLARY BLOOD GLUCOSE      POCT Blood Glucose.: 161 mg/dL (04 Oct 2018 21:35)  POCT Blood Glucose.: 116 mg/dL (04 Oct 2018 17:32)  POCT Blood Glucose.: 227 mg/dL (04 Oct 2018 12:09)  POCT Blood Glucose.: 134 mg/dL (04 Oct 2018 08:55)    I&O's Summary      PHYSICAL EXAM:  GENERAL: NAD, well-developed  HEAD:  Atraumatic, Normocephalic  NECK: Supple, No JVD  CHEST/LUNG: Clear to auscultation bilaterally; No wheezing.  HEART: Regular rate and rhythm; No murmurs, rubs, or gallops  ABDOMEN: Soft, Nontender, Nondistended; Bowel sounds present  EXTREMITIES:   No clubbing, cyanosis, or edema  NEUROLOGY: AAO X 3  SKIN: No rashes    LABS:                        8.9    5.51  )-----------( 157      ( 04 Oct 2018 12:56 )             27.8     10-    133<L>  |  100  |  19  ----------------------------<  202<H>  3.8   |  23  |  1.81<H>    Ca    7.4<L>      04 Oct 2018 12:56  Phos  3.1     10-  Mg     1.7     10-04    TPro  5.4<L>  /  Alb  2.3<L>  /  TBili  0.3  /  DBili  x   /  AST  34  /  ALT  23  /  AlkPhos  84  10-04    PT/INR - ( 03 Oct 2018 06:35 )   PT: 11.9 SEC;   INR: 1.07          PTT - ( 03 Oct 2018 06:35 )  PTT:33.1 SEC      Urinalysis Basic - ( 03 Oct 2018 08:39 )    Color: YELLOW / Appearance: CLEAR / S.022 / pH: 6.5  Gluc: NEGATIVE / Ketone: NEGATIVE  / Bili: NEGATIVE / Urobili: TRACE   Blood: SMALL / Protein: >600 / Nitrite: NEGATIVE   Leuk Esterase: NEGATIVE / RBC: 5-10 / WBC 0-2   Sq Epi: FEW / Non Sq Epi: x / Bacteria: NEGATIVE      CAPILLARY BLOOD GLUCOSE      POCT Blood Glucose.: 161 mg/dL (04 Oct 2018 21:35)  POCT Blood Glucose.: 116 mg/dL (04 Oct 2018 17:32)  POCT Blood Glucose.: 227 mg/dL (04 Oct 2018 12:09)  POCT Blood Glucose.: 134 mg/dL (04 Oct 2018 08:55)    10-03 @ 06:20  Culture-urine --  Culture results --  method type --  Organism --  Organism Identification --  Specimen source BLOOD PERIPHERAL           10-03 @ 06:20  Culture blood   NO ORGANISMS ISOLATED  NO ORGANISMS ISOLATED AT 24 HOURS  Culture results --  Gram stain --  Gram stain blood --  Method type --  Organism --  Organism identification --  Specimen source BLOOD PERIPHERAL      RADIOLOGY & ADDITIONAL TESTS:    Imaging Personally Reviewed:    Consultant(s) Notes Reviewed:      Care Discussed with Consultants/Other Providers:

## 2018-10-04 NOTE — DISCHARGE NOTE ADULT - CARE PLAN
Principal Discharge DX:	Fever  Goal:	Resolution and return to baseline  Assessment and plan of treatment:	Likely secondary to________ Infectious disease was consulted_____  Secondary Diagnosis:	Generalized abdominal pain  Secondary Diagnosis:	Persistent proteinuria  Secondary Diagnosis:	Type 2 diabetes mellitus without complication, with long-term current use of insulin  Assessment and plan of treatment:	Continue your medication regimen and a consistent carbohydrate diet (Meaning eating the same amount of carbohydrates at the same time each day). Monitor blood glucose levels throughout the day before meals and at bedtime. Record blood sugars and bring to outpatient providers appointment in order to be reviewed by your doctor for management modifications. Monitor for signs/symptoms of low blood glucose, such as, dizziness, altered mental status, or cool/clammy skin. In addition, monitor for signs/symptoms of high blood glucose, such as, feeling hot, dry, fatigued, or with increased thirst/urination. Make regular podiatry appointments in order to have feet checked for wounds and uncontrolled toe nail growth to prevent infections, as well as, appointments with an ophthalmologist to monitor your vision.  Secondary Diagnosis:	CKD (chronic kidney disease), stage III  Assessment and plan of treatment:	In order to prevent further disease progression, continue to follow recommendations made by your primary provider/nephrologist. Continue a diet that is low in sodium and avoid foods that are concentrated in potassium and phosphorus. Continue your medications/supplementations as directed and avoid over-the-counter drugs that are harmful to kidneys, such as, Non-Steroidal Anti-Inflammatory Drugs (NSAIDs). Follow-up as outpatient to monitor your kidney function, as well as, vitamin D, Calcium, potassium, and phosphorus levels.  Secondary Diagnosis:	Essential hypertension  Assessment and plan of treatment:	Continue blood pressure medication regimen as directed. Monitor for any visual changes, headaches or dizziness.  Monitor blood pressure regularly.  Follow up with your PCP for further management for high blood pressure. Principal Discharge DX:	Fever  Goal:	Resolution and return to baseline  Assessment and plan of treatment:	Likely secondary to________ Infectious disease was consulted_____  Secondary Diagnosis:	Generalized abdominal pain  Goal:	symptoms control  Secondary Diagnosis:	Persistent proteinuria  Goal:	further management  Secondary Diagnosis:	Type 2 diabetes mellitus without complication, with long-term current use of insulin  Goal:	Glucose control  Assessment and plan of treatment:	Continue your medication regimen and a consistent carbohydrate diet (Meaning eating the same amount of carbohydrates at the same time each day). Monitor blood glucose levels throughout the day before meals and at bedtime. Record blood sugars and bring to outpatient providers appointment in order to be reviewed by your doctor for management modifications. Monitor for signs/symptoms of low blood glucose, such as, dizziness, altered mental status, or cool/clammy skin. In addition, monitor for signs/symptoms of high blood glucose, such as, feeling hot, dry, fatigued, or with increased thirst/urination. Make regular podiatry appointments in order to have feet checked for wounds and uncontrolled toe nail growth to prevent infections, as well as, appointments with an ophthalmologist to monitor your vision.  Secondary Diagnosis:	CKD (chronic kidney disease), stage III  Goal:	stable renal function  Assessment and plan of treatment:	In order to prevent further disease progression, continue to follow recommendations made by your primary provider/nephrologist. Continue a diet that is low in sodium and avoid foods that are concentrated in potassium and phosphorus. Continue your medications/supplementations as directed and avoid over-the-counter drugs that are harmful to kidneys, such as, Non-Steroidal Anti-Inflammatory Drugs (NSAIDs). Follow-up as outpatient to monitor your kidney function, as well as, vitamin D, Calcium, potassium, and phosphorus levels.  Secondary Diagnosis:	Essential hypertension  Goal:	BP control  Assessment and plan of treatment:	Continue blood pressure medication metoprolol as prescribed. Lisinopril held during hospital stay due to TAINA      Monitor for any visual changes, headaches or dizziness.  Monitor blood pressure regularly.  Follow up with your PCP for further management for high blood pressure. Principal Discharge DX:	Fever  Goal:	Resolution and return to baseline  Assessment and plan of treatment:	Infectious disease was consulted.  CT Abdomen done: No source of infection  Secondary Diagnosis:	Generalized abdominal pain  Goal:	symptoms control  Assessment and plan of treatment:	resolved  Secondary Diagnosis:	Persistent proteinuria  Goal:	further management  Assessment and plan of treatment:	Follow up with PCP in 1 week to discuss further plan  Secondary Diagnosis:	Type 2 diabetes mellitus without complication, with long-term current use of insulin  Goal:	Glucose control  Assessment and plan of treatment:	Continue your medication regimen and a consistent carbohydrate diet (Meaning eating the same amount of carbohydrates at the same time each day). Monitor blood glucose levels throughout the day before meals and at bedtime. Record blood sugars and bring to outpatient providers appointment in order to be reviewed by your doctor for management modifications. Monitor for signs/symptoms of low blood glucose, such as, dizziness, altered mental status, or cool/clammy skin. In addition, monitor for signs/symptoms of high blood glucose, such as, feeling hot, dry, fatigued, or with increased thirst/urination. Make regular podiatry appointments in order to have feet checked for wounds and uncontrolled toe nail growth to prevent infections, as well as, appointments with an ophthalmologist to monitor your vision.  Secondary Diagnosis:	CKD (chronic kidney disease), stage III  Goal:	stable renal function  Assessment and plan of treatment:	In order to prevent further disease progression, continue to follow recommendations made by your primary provider/nephrologist. Continue a diet that is low in sodium and avoid foods that are concentrated in potassium and phosphorus. Continue your medications/supplementations as directed and avoid over-the-counter drugs that are harmful to kidneys, such as, Non-Steroidal Anti-Inflammatory Drugs (NSAIDs). Follow-up as outpatient to monitor your kidney function, as well as, vitamin D, Calcium, potassium, and phosphorus levels.  Secondary Diagnosis:	Essential hypertension  Goal:	BP control  Assessment and plan of treatment:	Continue blood pressure medication metoprolol as prescribed. Lisinopril held during hospital stay due to TAINA      Monitor for any visual changes, headaches or dizziness.  Monitor blood pressure regularly.  Follow up with your PCP for further management for high blood pressure.

## 2018-10-05 LAB
ALBUMIN SERPL ELPH-MCNC: 2.3 G/DL — LOW (ref 3.3–5)
ALP SERPL-CCNC: 92 U/L — SIGNIFICANT CHANGE UP (ref 40–120)
ALT FLD-CCNC: 23 U/L — SIGNIFICANT CHANGE UP (ref 4–41)
AST SERPL-CCNC: 34 U/L — SIGNIFICANT CHANGE UP (ref 4–40)
BILIRUB SERPL-MCNC: 0.4 MG/DL — SIGNIFICANT CHANGE UP (ref 0.2–1.2)
BUN SERPL-MCNC: 23 MG/DL — SIGNIFICANT CHANGE UP (ref 7–23)
CALCIUM SERPL-MCNC: 7.9 MG/DL — LOW (ref 8.4–10.5)
CHLORIDE SERPL-SCNC: 99 MMOL/L — SIGNIFICANT CHANGE UP (ref 98–107)
CO2 SERPL-SCNC: 23 MMOL/L — SIGNIFICANT CHANGE UP (ref 22–31)
CREAT SERPL-MCNC: 2.1 MG/DL — HIGH (ref 0.5–1.3)
GLUCOSE BLDC GLUCOMTR-MCNC: 108 MG/DL — HIGH (ref 70–99)
GLUCOSE BLDC GLUCOMTR-MCNC: 115 MG/DL — HIGH (ref 70–99)
GLUCOSE BLDC GLUCOMTR-MCNC: 136 MG/DL — HIGH (ref 70–99)
GLUCOSE BLDC GLUCOMTR-MCNC: 230 MG/DL — HIGH (ref 70–99)
GLUCOSE SERPL-MCNC: 94 MG/DL — SIGNIFICANT CHANGE UP (ref 70–99)
HCT VFR BLD CALC: 29.7 % — LOW (ref 39–50)
HGB BLD-MCNC: 9.7 G/DL — LOW (ref 13–17)
MAGNESIUM SERPL-MCNC: 2 MG/DL — SIGNIFICANT CHANGE UP (ref 1.6–2.6)
MCHC RBC-ENTMCNC: 27.5 PG — SIGNIFICANT CHANGE UP (ref 27–34)
MCHC RBC-ENTMCNC: 32.7 % — SIGNIFICANT CHANGE UP (ref 32–36)
MCV RBC AUTO: 84.1 FL — SIGNIFICANT CHANGE UP (ref 80–100)
NRBC # FLD: 0 — SIGNIFICANT CHANGE UP
PHOSPHATE SERPL-MCNC: 3.7 MG/DL — SIGNIFICANT CHANGE UP (ref 2.5–4.5)
PLATELET # BLD AUTO: 172 K/UL — SIGNIFICANT CHANGE UP (ref 150–400)
PMV BLD: 10.7 FL — SIGNIFICANT CHANGE UP (ref 7–13)
POTASSIUM SERPL-MCNC: 3.8 MMOL/L — SIGNIFICANT CHANGE UP (ref 3.5–5.3)
POTASSIUM SERPL-SCNC: 3.8 MMOL/L — SIGNIFICANT CHANGE UP (ref 3.5–5.3)
PROT SERPL-MCNC: 5.8 G/DL — LOW (ref 6–8.3)
RBC # BLD: 3.53 M/UL — LOW (ref 4.2–5.8)
RBC # FLD: 13 % — SIGNIFICANT CHANGE UP (ref 10.3–14.5)
SODIUM SERPL-SCNC: 134 MMOL/L — LOW (ref 135–145)
WBC # BLD: 6.61 K/UL — SIGNIFICANT CHANGE UP (ref 3.8–10.5)
WBC # FLD AUTO: 6.61 K/UL — SIGNIFICANT CHANGE UP (ref 3.8–10.5)

## 2018-10-05 PROCEDURE — 99232 SBSQ HOSP IP/OBS MODERATE 35: CPT

## 2018-10-05 RX ADMIN — Medication 50 MILLIGRAM(S): at 05:38

## 2018-10-05 RX ADMIN — POLYETHYLENE GLYCOL 3350 17 GRAM(S): 17 POWDER, FOR SOLUTION ORAL at 12:33

## 2018-10-05 RX ADMIN — Medication 650 MILLIGRAM(S): at 02:20

## 2018-10-05 RX ADMIN — PIPERACILLIN AND TAZOBACTAM 25 GRAM(S): 4; .5 INJECTION, POWDER, LYOPHILIZED, FOR SOLUTION INTRAVENOUS at 13:03

## 2018-10-05 RX ADMIN — HEPARIN SODIUM 5000 UNIT(S): 5000 INJECTION INTRAVENOUS; SUBCUTANEOUS at 12:33

## 2018-10-05 RX ADMIN — Medication 100 MILLIGRAM(S): at 12:32

## 2018-10-05 RX ADMIN — Medication 650 MILLIGRAM(S): at 15:02

## 2018-10-05 RX ADMIN — HEPARIN SODIUM 5000 UNIT(S): 5000 INJECTION INTRAVENOUS; SUBCUTANEOUS at 05:38

## 2018-10-05 RX ADMIN — AMLODIPINE BESYLATE 10 MILLIGRAM(S): 2.5 TABLET ORAL at 05:38

## 2018-10-05 RX ADMIN — Medication 325 MILLIGRAM(S): at 12:33

## 2018-10-05 RX ADMIN — PIPERACILLIN AND TAZOBACTAM 25 GRAM(S): 4; .5 INJECTION, POWDER, LYOPHILIZED, FOR SOLUTION INTRAVENOUS at 05:38

## 2018-10-05 RX ADMIN — TAMSULOSIN HYDROCHLORIDE 0.4 MILLIGRAM(S): 0.4 CAPSULE ORAL at 22:11

## 2018-10-05 RX ADMIN — FINASTERIDE 5 MILLIGRAM(S): 5 TABLET, FILM COATED ORAL at 12:33

## 2018-10-05 RX ADMIN — Medication 2: at 12:44

## 2018-10-05 RX ADMIN — INSULIN GLARGINE 5 UNIT(S): 100 INJECTION, SOLUTION SUBCUTANEOUS at 22:11

## 2018-10-05 RX ADMIN — HEPARIN SODIUM 5000 UNIT(S): 5000 INJECTION INTRAVENOUS; SUBCUTANEOUS at 22:11

## 2018-10-05 RX ADMIN — Medication 650 MILLIGRAM(S): at 01:13

## 2018-10-05 RX ADMIN — LISINOPRIL 40 MILLIGRAM(S): 2.5 TABLET ORAL at 05:38

## 2018-10-05 RX ADMIN — Medication 650 MILLIGRAM(S): at 14:32

## 2018-10-05 RX ADMIN — SENNA PLUS 2 TABLET(S): 8.6 TABLET ORAL at 22:11

## 2018-10-05 NOTE — PROGRESS NOTE ADULT - ASSESSMENT
84 year old male with htn, CAD s/p CABG, post op Afib, DM, s/p  cholecystectomy presenting with abd pain/ fever     1. CAD s/p CABG  cv stable no chest pain or sob, no decomp chf on exam  continue 325 mg ASA, BB and statin     2. Afib   no further reoccurrence. remains in SR   continue BB and ASA     3. HTN   sys bp acceptable, continue with current anti-htn meds     4 Abd pain   CT abd pain unremarkable, revealing unchanged pancreatic cyst.   med f/u, gi eval ?     5. Fever  UA negative, chest xray unremarkable   ct abd noted   BC negative , med f/u   work up per ID     dvt ppx

## 2018-10-05 NOTE — PROGRESS NOTE ADULT - SUBJECTIVE AND OBJECTIVE BOX
Patient is a 84y old  Male who presents with a chief complaint of abdominal pain (05 Oct 2018 15:43)      SUBJECTIVE / OVERNIGHT EVENTS:   Feels better.  Denies CP/SOB/Palpitation/HA.    MEDICATIONS  (STANDING):  amLODIPine   Tablet 10 milliGRAM(s) Oral daily  aspirin enteric coated 325 milliGRAM(s) Oral daily  dextrose 5%. 1000 milliLiter(s) (50 mL/Hr) IV Continuous <Continuous>  dextrose 50% Injectable 12.5 Gram(s) IV Push once  dextrose 50% Injectable 25 Gram(s) IV Push once  dextrose 50% Injectable 25 Gram(s) IV Push once  docusate sodium 100 milliGRAM(s) Oral daily  finasteride 5 milliGRAM(s) Oral daily  heparin  Injectable 5000 Unit(s) SubCutaneous every 8 hours  influenza   Vaccine 0.5 milliLiter(s) IntraMuscular once  insulin glargine Injectable (LANTUS) 5 Unit(s) SubCutaneous at bedtime  insulin lispro (HumaLOG) corrective regimen sliding scale   SubCutaneous three times a day before meals  insulin lispro (HumaLOG) corrective regimen sliding scale   SubCutaneous at bedtime  metoprolol succinate ER 50 milliGRAM(s) Oral daily  polyethylene glycol 3350 17 Gram(s) Oral daily  senna 2 Tablet(s) Oral at bedtime  tamsulosin 0.4 milliGRAM(s) Oral at bedtime    MEDICATIONS  (PRN):  acetaminophen   Tablet .. 650 milliGRAM(s) Oral every 6 hours PRN Temp greater or equal to 38C (100.4F), Moderate Pain (4 - 6)  dextrose 40% Gel 15 Gram(s) Oral once PRN Blood Glucose LESS THAN 70 milliGRAM(s)/deciliter  glucagon  Injectable 1 milliGRAM(s) IntraMuscular once PRN Glucose LESS THAN 70 milligrams/deciliter        CAPILLARY BLOOD GLUCOSE      POCT Blood Glucose.: 108 mg/dL (05 Oct 2018 17:34)  POCT Blood Glucose.: 230 mg/dL (05 Oct 2018 12:13)  POCT Blood Glucose.: 115 mg/dL (05 Oct 2018 08:46)    I&O's Summary      PHYSICAL EXAM:  GENERAL: NAD, well-developed  HEAD:  Atraumatic, Normocephalic  NECK: Supple, No JVD  CHEST/LUNG: Clear to auscultation bilaterally; No wheezing.  HEART: Regular rate and rhythm; No murmurs, rubs, or gallops  ABDOMEN: Soft, Nontender, Nondistended; Bowel sounds present  EXTREMITIES:   No clubbing, cyanosis, or edema  NEUROLOGY: AAO X 3  SKIN: No rashes    LABS:                        9.7    6.61  )-----------( 172      ( 05 Oct 2018 05:39 )             29.7     10-05    134<L>  |  99  |  23  ----------------------------<  94  3.8   |  23  |  2.10<H>    Ca    7.9<L>      05 Oct 2018 05:39  Phos  3.7     10-05  Mg     2.0     10-05    TPro  5.8<L>  /  Alb  2.3<L>  /  TBili  0.4  /  DBili  x   /  AST  34  /  ALT  23  /  AlkPhos  92  10-05            CAPILLARY BLOOD GLUCOSE      POCT Blood Glucose.: 108 mg/dL (05 Oct 2018 17:34)  POCT Blood Glucose.: 230 mg/dL (05 Oct 2018 12:13)  POCT Blood Glucose.: 115 mg/dL (05 Oct 2018 08:46)    10-03 @ 06:20  Culture-urine --  Culture results --  method type --  Organism --  Organism Identification --  Specimen source BLOOD PERIPHERAL           10-03 @ 06:20  Culture blood   NO ORGANISMS ISOLATED  NO ORGANISMS ISOLATED AT 48 HRS.  Culture results --  Gram stain --  Gram stain blood --  Method type --  Organism --  Organism identification --  Specimen source BLOOD PERIPHERAL      RADIOLOGY & ADDITIONAL TESTS:    Imaging Personally Reviewed:    Consultant(s) Notes Reviewed:      Care Discussed with Consultants/Other Providers:

## 2018-10-05 NOTE — CONSULT NOTE ADULT - ASSESSMENT
85 yo male PMhx DM type 2 (insulin dependent), CAD s/pCABG (2016), HTN, BPH, recent CDU stay for ?pyelonephritis sent out on 2 weeks of ceftinir (June, 2018) presented 10/3 for 1 week of abdominal pain and now low grade fevers. Renal consult called today for worsening RFT.    TAINA on CKD3 b/l Cr 1.57 06/2018    w/worsening RFT Cr 1.6>1.8>2.1   TAINA 2/2 ATN vs obstruction. no hydro on CT abd. low suspicion for AIN from zosyn  HTN, controlled  Fevers ?source- on Zosyn. blood cxs NTD  DM-Mx per medicine    labs, rad, chart reviewed  check PVR w/bladder scan. RN informed  strict I/0s  Hold Lisinopril for now  avoid nephrotoxics/NSAIDs  dose all meds for eGFR <15ml/min  monitor BMP daily  rpt UA, check urine lytes, UPCR, urine eosinophils  ID note reviewed, rec to d/c Zosyn  f/u final blood cxs    c/w BB, Norvasc  Thanks for consulting. will closely follow up.

## 2018-10-05 NOTE — CONSULT NOTE ADULT - SUBJECTIVE AND OBJECTIVE BOX
[facility] [place] [facility] - Garret / Mary S /Gomez/ S Nataliia/ S Citlali/ Kane Powell / YELITZA Njeru  ---------------------------------------------------------------------------------------------------------------  Patient seen and examined bedside    85 yo male PMhx DM type 2 (insulin dependent), CAD s/pCABG (), HTN, BPH, recent CDU stay for ?pyelonephritis sent out on 2 weeks of ceftinir () presented 10/3 for 1 week of abdominal pain and now low grade fevers. Renal consult called today for worsening RFT. Information obtained from chart and wife bedside as unable to communicate properly with patient given extreme hard of hearing. Pt states pain is gone.  Per RN/PCA -no V/D, unsure of u/o. no documented recent NSAID use/iv contrast studies.    PAST MEDICAL & SURGICAL HISTORY:  3-vessel coronary artery disease  HTN (hypertension)  DM (diabetes mellitus)  S/P cholecystectomy      Allergies: No Known Allergies    Home Medications Reviewed  Hospital Medications:   MEDICATIONS  (STANDING):  amLODIPine   Tablet 10 milliGRAM(s) Oral daily  aspirin enteric coated 325 milliGRAM(s) Oral daily  dextrose 5%. 1000 milliLiter(s) (50 mL/Hr) IV Continuous <Continuous>  dextrose 50% Injectable 12.5 Gram(s) IV Push once  dextrose 50% Injectable 25 Gram(s) IV Push once  dextrose 50% Injectable 25 Gram(s) IV Push once  docusate sodium 100 milliGRAM(s) Oral daily  finasteride 5 milliGRAM(s) Oral daily  heparin  Injectable 5000 Unit(s) SubCutaneous every 8 hours  influenza   Vaccine 0.5 milliLiter(s) IntraMuscular once  insulin glargine Injectable (LANTUS) 5 Unit(s) SubCutaneous at bedtime  insulin lispro (HumaLOG) corrective regimen sliding scale   SubCutaneous three times a day before meals  insulin lispro (HumaLOG) corrective regimen sliding scale   SubCutaneous at bedtime  metoprolol succinate ER 50 milliGRAM(s) Oral daily  polyethylene glycol 3350 17 Gram(s) Oral daily  senna 2 Tablet(s) Oral at bedtime  tamsulosin 0.4 milliGRAM(s) Oral at bedtime    SOCIAL HISTORY:  Denies ETOh,Smoking, illicit drug use  FAMILY HISTORY:  No pertinent family history in first degree relatives      REVIEW OF SYSTEMS:  CONSTITUTIONAL: No weakness, fevers or chills  EYES/ENT: No visual changes;  No vertigo or throat pain   NECK: No pain or stiffness  RESPIRATORY: No cough, wheezing, hemoptysis; No shortness of breath  CARDIOVASCULAR: No chest pain or palpitations.  GASTROINTESTINAL: abdominal pain resolved. No nausea, vomiting, or hematemesis; No diarrhea or constipation. No melena or hematochezia.  GENITOURINARY: No dysuria, frequency, foamy urine, urinary urgency, incontinence or hematuria  NEUROLOGICAL: No numbness or weakness  SKIN: No itching, burning, rashes, or lesions   VASCULAR: No bilateral lower extremity edema.   All other review of systems is negative unless indicated above.    VITALS:  T(F): 102.6 (10-05-18 @ 14:08), Max: 102.6 (10-05-18 @ 14:08)  HR: 78 (10-05-18 @ 14:08)  BP: 157/58 (10-05-18 @ 14:08)  RR: 18 (10-05-18 @ 14:08)  SpO2: 90% (10-05-18 @ 14:08)  Wt(kg): --      PHYSICAL EXAM:  Constitutional: NAD  HEENT: anicteric sclera, oropharynx clear, MMM  Neck: No JVD  Respiratory: CTAB, no wheezes, rales or rhonchi  Cardiovascular: S1, S2, RRR  Gastrointestinal: BS+, soft, NT, ? distended  Extremities: No cyanosis or clubbing. No peripheral edema  Neurological: A/O x 2, no focal deficits  Psychiatric: Normal mood, normal affect  : No CVA tenderness. No winter.   Skin: No rashes      LABS:  10-05    134<L>  |  99  |  23  ----------------------------<  94  3.8   |  23  |  2.10<H>    Ca    7.9<L>      05 Oct 2018 05:39  Phos  3.7     10-05  Mg     2.0     10-05    TPro  5.8<L>  /  Alb  2.3<L>  /  TBili  0.4  /  DBili      /  AST  34  /  ALT  23  /  AlkPhos  92  10-05    Creatinine Trend: 2.10 <--, 1.81 <--, 1.62 <--                        9.7    6.61  )-----------( 172      ( 05 Oct 2018 05:39 )             29.7     Urine Studies:  Urinalysis Basic - ( 03 Oct 2018 08:39 )    Color: YELLOW / Appearance: CLEAR / S.022 / pH: 6.5  Gluc: NEGATIVE / Ketone: NEGATIVE  / Bili: NEGATIVE / Urobili: TRACE   Blood: SMALL / Protein: >600 / Nitrite: NEGATIVE   Leuk Esterase: NEGATIVE / RBC: 5-10 / WBC 0-2   Sq Epi: FEW / Non Sq Epi:  / Bacteria: NEGATIVE    RADIOLOGY & ADDITIONAL STUDIES:    < from: CT Abdomen and Pelvis w/ Oral Cont and w/ IV Cont (10.03.18 @ 08:56) >    KIDNEYS/URETERS: Subcentimeter hypodense foci in the left kidney, too   small to categorize. No hydronephrosis.    BOWEL: No bowel obstruction. Scattered colonic diverticulosis without   inflammatory changes. Appendix is normal.

## 2018-10-05 NOTE — PROGRESS NOTE ADULT - SUBJECTIVE AND OBJECTIVE BOX
Follow Up:  fever, abdominal pain    Inverval History/ROS: asking to go home.   has not had recurrence of abdominal pain.  no fever, chills, dysuria. rest of ros neg    Allergies  No Known Allergies        ANTIMICROBIALS:  piperacillin/tazobactam IVPB. 3.375 every 8 hours      OTHER MEDS:  acetaminophen   Tablet .. 650 milliGRAM(s) Oral every 6 hours PRN  amLODIPine   Tablet 10 milliGRAM(s) Oral daily  aspirin enteric coated 325 milliGRAM(s) Oral daily  dextrose 40% Gel 15 Gram(s) Oral once PRN  dextrose 5%. 1000 milliLiter(s) IV Continuous <Continuous>  dextrose 50% Injectable 12.5 Gram(s) IV Push once  dextrose 50% Injectable 25 Gram(s) IV Push once  dextrose 50% Injectable 25 Gram(s) IV Push once  docusate sodium 100 milliGRAM(s) Oral daily  finasteride 5 milliGRAM(s) Oral daily  glucagon  Injectable 1 milliGRAM(s) IntraMuscular once PRN  heparin  Injectable 5000 Unit(s) SubCutaneous every 8 hours  influenza   Vaccine 0.5 milliLiter(s) IntraMuscular once  insulin glargine Injectable (LANTUS) 5 Unit(s) SubCutaneous at bedtime  insulin lispro (HumaLOG) corrective regimen sliding scale   SubCutaneous three times a day before meals  insulin lispro (HumaLOG) corrective regimen sliding scale   SubCutaneous at bedtime  lisinopril 40 milliGRAM(s) Oral daily  metoprolol succinate ER 50 milliGRAM(s) Oral daily  polyethylene glycol 3350 17 Gram(s) Oral daily  senna 2 Tablet(s) Oral at bedtime  tamsulosin 0.4 milliGRAM(s) Oral at bedtime      Vital Signs Last 24 Hrs  T(C): 36.7 (05 Oct 2018 05:36), Max: 38.1 (05 Oct 2018 00:50)  T(F): 98 (05 Oct 2018 05:36), Max: 100.5 (05 Oct 2018 00:50)  HR: 55 (05 Oct 2018 05:36) (55 - 79)  BP: 155/57 (05 Oct 2018 05:36) (110/53 - 155/57)  BP(mean): --  RR: 18 (05 Oct 2018 05:36) (17 - 18)  SpO2: 95% (05 Oct 2018 05:36) (86% - 96%)    PHYSICAL EXAM:  General: [x ] non-toxic, [place]  HEAD/EYES: [ ] PERRL [x ] white sclera [ ] icterus  ENT:  [ ] normal [x ] supple [ ] thrush [ ] pharyngeal exudate  Cardiovascular:   [ ] murmur [x ] normal [ ] PPM/AICD  Respiratory:  [x ] clear to ausculation bilaterally  GI:  [x ] soft, non-tender, normal bowel sounds  :  [ ] winter [x ] no CVA tenderness   Musculoskeletal:  [x ] no synovitis  Neurologic:  [x ] non-focal exam   Skin:  [x ] no rash  Lymph: [ ] no lymphadenopathy  Psychiatric:  [x ] appropriate affect [ ] alert & oriented  Lines:  [x ] no phlebitis [ ] central line                                9.7    6.61  )-----------( 172      ( 05 Oct 2018 05:39 )             29.7       10-05    134<L>  |  99  |  23  ----------------------------<  94  3.8   |  23  |  2.10<H>    Ca    7.9<L>      05 Oct 2018 05:39  Phos  3.7     10-05  Mg     2.0     10-05    TPro  5.8<L>  /  Alb  2.3<L>  /  TBili  0.4  /  DBili  x   /  AST  34  /  ALT  23  /  AlkPhos  92  10-05    Urinalysis (10.03.18 @ 08:39)    Color: YELLOW    Urine Appearance: CLEAR    Glucose: NEGATIVE    Bilirubin: NEGATIVE    Ketone - Urine: NEGATIVE    Specific Gravity: 1.022    Blood: SMALL    pH - Urine: 6.5    Protein, Urine: >600    Urobilinogen: TRACE    Nitrite: NEGATIVE    Leukocyte Esterase Concentration: NEGATIVE    Red Blood Cell - Urine: 5-10    White Blood Cell - Urine: 0-2    Hyaline Casts: 0-2    Bacteria: NEGATIVE    Squamous Epithelial: FEW          MICROBIOLOGY:    BLOOD PERIPHERAL  10-03-18 --  --  --    no growth x 48 hours      RADIOLOGY:    < from: Xray Chest 2 Views PA/Lat (10.03.18 @ 06:35) >  Sternotomy wires and clips from previous cardiac surgery are present.   Heart is mildly enlarged but stable. No focal consolidations. No   effusions or congestion to suggest CHF.      COMPARISON:  March 14, 2016      IMPRESSION:  Clear lungs.    < end of copied text >  < from: CT Abdomen and Pelvis w/ Oral Cont and w/ IV Cont (10.03.18 @ 08:56) >  FINDINGS:    LOWER CHEST: Bibasilar dependent atelectasis with trace pleural effusions   bilaterally, right greater than left. Cardiomegaly. Coronary artery   calcifications. Sternotomy.    LIVER: Within normal limits.  BILE DUCTS: Intrahepatic and extrahepatic biliary ductal dilatation with   the common bile duct measuring 1.4 cm, unchanged.  GALLBLADDER: Cholecystectomy.  SPLEEN: Within normal limits.  PANCREAS: 1.2 cm cyst in the tail the pancreas, unchanged. No pancreatic   ductal dilatation  ADRENALS: Within normal limits.  KIDNEYS/URETERS: Subcentimeter hypodense foci in the left kidney, too   small to categorize. No hydronephrosis.    BLADDER: Within normal limits.  REPRODUCTIVE ORGANS: The prostate is enlarged.    BOWEL: No bowel obstruction. Scattered colonic diverticulosis without   inflammatory changes. Appendix is normal.  PERITONEUM: No ascites or pneumoperitoneum.  VESSELS:  Atherosclerotic changes.  RETROPERITONEUM: No lymphadenopathy.    ABDOMINAL WALL: Within normal limits.  BONES: Degenerative changes of the spine.    IMPRESSION:    Stable appearance of the bile ducts.    Normal appendix.    < end of copied text >

## 2018-10-05 NOTE — PROGRESS NOTE ADULT - SUBJECTIVE AND OBJECTIVE BOX
CARDIOLOGY FOLLOW UP - Dr. Villarreal    CC no chest pain or sob   no abd pain   low grade temp over night    PHYSICAL EXAM:  T(C): 36.7 (10-05-18 @ 05:36), Max: 38.1 (10-05-18 @ 00:50)  HR: 55 (10-05-18 @ 05:36) (55 - 79)  BP: 155/57 (10-05-18 @ 05:36) (110/53 - 155/57)  RR: 18 (10-05-18 @ 05:36) (17 - 18)  SpO2: 95% (10-05-18 @ 05:36) (86% - 96%)  Wt(kg): --  I&O's Summary      Appearance: Normal	  Cardiovascular: Normal S1 S2,RRR, No JVD, No murmurs  Respiratory: Lungs clear to auscultation	  Gastrointestinal:  Soft, Non-tender, + BS	  Extremities: Normal range of motion, No clubbing, cyanosis or edema        MEDICATIONS  (STANDING):  amLODIPine   Tablet 10 milliGRAM(s) Oral daily  aspirin enteric coated 325 milliGRAM(s) Oral daily  dextrose 5%. 1000 milliLiter(s) (50 mL/Hr) IV Continuous <Continuous>  dextrose 50% Injectable 12.5 Gram(s) IV Push once  dextrose 50% Injectable 25 Gram(s) IV Push once  dextrose 50% Injectable 25 Gram(s) IV Push once  docusate sodium 100 milliGRAM(s) Oral daily  finasteride 5 milliGRAM(s) Oral daily  heparin  Injectable 5000 Unit(s) SubCutaneous every 8 hours  influenza   Vaccine 0.5 milliLiter(s) IntraMuscular once  insulin glargine Injectable (LANTUS) 5 Unit(s) SubCutaneous at bedtime  insulin lispro (HumaLOG) corrective regimen sliding scale   SubCutaneous three times a day before meals  insulin lispro (HumaLOG) corrective regimen sliding scale   SubCutaneous at bedtime  lisinopril 40 milliGRAM(s) Oral daily  metoprolol succinate ER 50 milliGRAM(s) Oral daily  piperacillin/tazobactam IVPB. 3.375 Gram(s) IV Intermittent every 8 hours  polyethylene glycol 3350 17 Gram(s) Oral daily  senna 2 Tablet(s) Oral at bedtime  tamsulosin 0.4 milliGRAM(s) Oral at bedtime      TELEMETRY: 	    ECG:  	  RADIOLOGY:   DIAGNOSTIC TESTING:  [ ] Echocardiogram:  [ ]  Catheterization:  [ ] Stress Test:    OTHER: 	    LABS:	 	                                9.7    6.61  )-----------( 172      ( 05 Oct 2018 05:39 )             29.7     10-05    134<L>  |  99  |  23  ----------------------------<  94  3.8   |  23  |  2.10<H>    Ca    7.9<L>      05 Oct 2018 05:39  Phos  3.7     10-05  Mg     2.0     10-05    TPro  5.8<L>  /  Alb  2.3<L>  /  TBili  0.4  /  DBili  x   /  AST  34  /  ALT  23  /  AlkPhos  92  10-05

## 2018-10-05 NOTE — PROGRESS NOTE ADULT - ASSESSMENT
85 yo m with DM type 2 (insulin dependent), CAD s/pCABG (2016), HTN, BPH, s/p CDU stay 6/2018 for UTI presenting on 10/3 for epigastric pain lasting several hours after eating dinner. denied dysuria.  noted febrile 102.4 on 10/4 x 1; now afebrile.     Abdominal pain  has now resolved.  no other complaints or localizing findings on exam..   UA neg.   blood cultures neg. x 48 hours   cxr neg.  CT stable bile duct dilatation, pancreas cyst.  No obvious focus of infection.        TAINA.         Epigastric abdominal pain, anemia - ?Occult GI bleed, ? gastroenteritis , ? diverticulitis    Suggest:  - Check GI PCR if diarrhea.  -d/c zosyn for now.      ID service available for questions, f/u this weekend.

## 2018-10-05 NOTE — PROGRESS NOTE ADULT - ASSESSMENT
· Assessment	  83 yo PMHx as above admitted with abdominal pain, fevers.    TAINA:  BMP  Nephro eval noted.     Problem/Plan - 1:  ·  Problem: Generalized abdominal pain.  Plan: unclear abdominal pain.   -patient with fever and abdominal pain history, negative hanna sign, no abdominal pain, guarding or rigidity.  - CT Abd: No source of infection     Problem/Plan - 2:  ·  Problem: Fever, unspecified fever cause.  Plan: unclear etiology    - ID f/up noted.  -Hold Zosyn     Problem/Plan - 3:  ·  Problem: Type 2 diabetes mellitus without complication, with long-term current use of insulin.  Plan: hypoglycemic todayl ikely in setting of poor PO intake without adjustment of insulin.  - FSSS     Problem/Plan - 4:  ·  Problem: CKD (chronic kidney disease), stage III.  Plan: avoid nephrotoxic agents, continue to monitor.      Problem/Plan - 5:  ·  Problem: Essential hypertension.  Plan:  metoprolol, lisinopril.      Problem/Plan - 6:  Problem: Persistent proteinuria. Plan: U protein from 300 to >600  -no clinical signs of nephrotic syndrome but witlh albumin 2.7   Nephro eval noted

## 2018-10-06 LAB
ALBUMIN SERPL ELPH-MCNC: 2.4 G/DL — LOW (ref 3.3–5)
ALP SERPL-CCNC: 109 U/L — SIGNIFICANT CHANGE UP (ref 40–120)
ALT FLD-CCNC: 22 U/L — SIGNIFICANT CHANGE UP (ref 4–41)
APPEARANCE UR: SIGNIFICANT CHANGE UP
AST SERPL-CCNC: 27 U/L — SIGNIFICANT CHANGE UP (ref 4–40)
BILIRUB SERPL-MCNC: 0.3 MG/DL — SIGNIFICANT CHANGE UP (ref 0.2–1.2)
BILIRUB UR-MCNC: NEGATIVE — SIGNIFICANT CHANGE UP
BLOOD UR QL VISUAL: HIGH
BUN SERPL-MCNC: 23 MG/DL — SIGNIFICANT CHANGE UP (ref 7–23)
CALCIUM SERPL-MCNC: 7.7 MG/DL — LOW (ref 8.4–10.5)
CHLORIDE SERPL-SCNC: 99 MMOL/L — SIGNIFICANT CHANGE UP (ref 98–107)
CHOLEST SERPL-MCNC: 161 MG/DL — SIGNIFICANT CHANGE UP (ref 120–199)
CO2 SERPL-SCNC: 22 MMOL/L — SIGNIFICANT CHANGE UP (ref 22–31)
COARSE GRAN CASTS #/AREA URNS AUTO: SIGNIFICANT CHANGE UP (ref 0–?)
COLOR SPEC: YELLOW — SIGNIFICANT CHANGE UP
CREAT ?TM UR-MCNC: 131 MG/DL — SIGNIFICANT CHANGE UP
CREAT SERPL-MCNC: 1.78 MG/DL — HIGH (ref 0.5–1.3)
EOSINOPHIL NFR URNS MANUAL: PRESENT — SIGNIFICANT CHANGE UP (ref 0–0)
EPI CELLS # UR: SIGNIFICANT CHANGE UP
GLUCOSE BLDC GLUCOMTR-MCNC: 88 MG/DL — SIGNIFICANT CHANGE UP (ref 70–99)
GLUCOSE SERPL-MCNC: 89 MG/DL — SIGNIFICANT CHANGE UP (ref 70–99)
GLUCOSE UR-MCNC: 50 — SIGNIFICANT CHANGE UP
HCT VFR BLD CALC: 30.8 % — LOW (ref 39–50)
HDLC SERPL-MCNC: 32 MG/DL — LOW (ref 35–55)
HGB BLD-MCNC: 10 G/DL — LOW (ref 13–17)
HYALINE CASTS # UR AUTO: SIGNIFICANT CHANGE UP
KETONES UR-MCNC: SIGNIFICANT CHANGE UP
LEUKOCYTE ESTERASE UR-ACNC: NEGATIVE — SIGNIFICANT CHANGE UP
LIPID PNL WITH DIRECT LDL SERPL: 102 MG/DL — SIGNIFICANT CHANGE UP
MAGNESIUM SERPL-MCNC: 2 MG/DL — SIGNIFICANT CHANGE UP (ref 1.6–2.6)
MCHC RBC-ENTMCNC: 27 PG — SIGNIFICANT CHANGE UP (ref 27–34)
MCHC RBC-ENTMCNC: 32.5 % — SIGNIFICANT CHANGE UP (ref 32–36)
MCV RBC AUTO: 83.2 FL — SIGNIFICANT CHANGE UP (ref 80–100)
MUCOUS THREADS # UR AUTO: SIGNIFICANT CHANGE UP
NITRITE UR-MCNC: NEGATIVE — SIGNIFICANT CHANGE UP
NRBC # FLD: 0 — SIGNIFICANT CHANGE UP
PH UR: 6 — SIGNIFICANT CHANGE UP (ref 5–8)
PHOSPHATE SERPL-MCNC: 2.8 MG/DL — SIGNIFICANT CHANGE UP (ref 2.5–4.5)
PLATELET # BLD AUTO: 229 K/UL — SIGNIFICANT CHANGE UP (ref 150–400)
PMV BLD: 10.6 FL — SIGNIFICANT CHANGE UP (ref 7–13)
POTASSIUM SERPL-MCNC: 3.6 MMOL/L — SIGNIFICANT CHANGE UP (ref 3.5–5.3)
POTASSIUM SERPL-SCNC: 3.6 MMOL/L — SIGNIFICANT CHANGE UP (ref 3.5–5.3)
PROT SERPL-MCNC: 6.2 G/DL — SIGNIFICANT CHANGE UP (ref 6–8.3)
PROT UR-MCNC: 300 — HIGH
PROT UR-MCNC: 387 MG/DL — SIGNIFICANT CHANGE UP
RBC # BLD: 3.7 M/UL — LOW (ref 4.2–5.8)
RBC # FLD: 13 % — SIGNIFICANT CHANGE UP (ref 10.3–14.5)
RBC CASTS # UR COMP ASSIST: >50 — HIGH (ref 0–?)
SODIUM SERPL-SCNC: 135 MMOL/L — SIGNIFICANT CHANGE UP (ref 135–145)
SODIUM UR-SCNC: 37 MMOL/L — SIGNIFICANT CHANGE UP
SP GR SPEC: 1.02 — SIGNIFICANT CHANGE UP (ref 1–1.04)
TRIGL SERPL-MCNC: 140 MG/DL — SIGNIFICANT CHANGE UP (ref 10–149)
UROBILINOGEN FLD QL: NORMAL — SIGNIFICANT CHANGE UP
WBC # BLD: 6.75 K/UL — SIGNIFICANT CHANGE UP (ref 3.8–10.5)
WBC # FLD AUTO: 6.75 K/UL — SIGNIFICANT CHANGE UP (ref 3.8–10.5)
WBC UR QL: SIGNIFICANT CHANGE UP (ref 0–?)

## 2018-10-06 RX ADMIN — Medication 325 MILLIGRAM(S): at 15:51

## 2018-10-06 RX ADMIN — Medication 50 MILLIGRAM(S): at 05:54

## 2018-10-06 RX ADMIN — Medication 1: at 17:50

## 2018-10-06 RX ADMIN — SENNA PLUS 2 TABLET(S): 8.6 TABLET ORAL at 23:49

## 2018-10-06 RX ADMIN — FINASTERIDE 5 MILLIGRAM(S): 5 TABLET, FILM COATED ORAL at 15:52

## 2018-10-06 RX ADMIN — TAMSULOSIN HYDROCHLORIDE 0.4 MILLIGRAM(S): 0.4 CAPSULE ORAL at 21:59

## 2018-10-06 RX ADMIN — AMLODIPINE BESYLATE 10 MILLIGRAM(S): 2.5 TABLET ORAL at 05:54

## 2018-10-06 RX ADMIN — HEPARIN SODIUM 5000 UNIT(S): 5000 INJECTION INTRAVENOUS; SUBCUTANEOUS at 05:54

## 2018-10-06 RX ADMIN — HEPARIN SODIUM 5000 UNIT(S): 5000 INJECTION INTRAVENOUS; SUBCUTANEOUS at 15:51

## 2018-10-06 RX ADMIN — HEPARIN SODIUM 5000 UNIT(S): 5000 INJECTION INTRAVENOUS; SUBCUTANEOUS at 21:59

## 2018-10-06 RX ADMIN — Medication 100 MILLIGRAM(S): at 15:52

## 2018-10-06 RX ADMIN — INSULIN GLARGINE 5 UNIT(S): 100 INJECTION, SOLUTION SUBCUTANEOUS at 22:02

## 2018-10-06 NOTE — PROGRESS NOTE ADULT - ASSESSMENT
· Assessment	  85 yo PMHx as above admitted with abdominal pain, fevers.    TAINA:  BMP  Nephro eval noted.     Problem/Plan - 1:  ·  Problem: Generalized abdominal pain.  Plan: unclear abdominal pain.   -patient with fever and abdominal pain history, negative hanna sign, no abdominal pain, guarding or rigidity.  - CT Abd: No source of infection     Problem/Plan - 2:  ·  Problem: Fever, unspecified fever cause.  Plan: unclear etiology    - ID f/up noted.  -Hold Zosyn     Problem/Plan - 3:  ·  Problem: Type 2 diabetes mellitus without complication, with long-term current use of insulin.  Plan: hypoglycemic todayl ikely in setting of poor PO intake without adjustment of insulin.  - FSSS     Problem/Plan - 4:  ·  Problem: CKD (chronic kidney disease), stage III.  Plan: avoid nephrotoxic agents, continue to monitor.      Problem/Plan - 5:  ·  Problem: Essential hypertension.  Plan:  metoprolol, lisinopril.      Problem/Plan - 6:  Problem: Persistent proteinuria. Plan: U protein from 300 to >600  -no clinical signs of nephrotic syndrome but witlh albumin 2.7   Nephro eval noted

## 2018-10-06 NOTE — PROGRESS NOTE ADULT - ASSESSMENT
84 year old male with htn, CAD s/p CABG, post op Afib, DM, s/p  cholecystectomy presenting with abd pain/ fever     1. CAD s/p CABG  cv stable no chest pain or sob, no decomp chf on exam  continue 325 mg ASA, BB and statin     2. Afib   no further reoccurrence. remains in SR   continue BB and ASA     3. HTN   sys bp acceptable, continue with current anti-htn meds     4 Abd pain   CT abd pain unremarkable, revealing unchanged pancreatic cyst.   resolved    5. Fever  UA negative, chest xray unremarkable   ct abd noted   BC negative    6. TAINA  improved    d/c planning

## 2018-10-06 NOTE — PROGRESS NOTE ADULT - SUBJECTIVE AND OBJECTIVE BOX
Patient is a 84y old  Male who presents with a chief complaint of abdominal pain (06 Oct 2018 15:50)      SUBJECTIVE / OVERNIGHT EVENTS:   Feels better.  Denies CP/SOB/Palpitation/HA.    MEDICATIONS  (STANDING):  amLODIPine   Tablet 10 milliGRAM(s) Oral daily  aspirin enteric coated 325 milliGRAM(s) Oral daily  dextrose 5%. 1000 milliLiter(s) (50 mL/Hr) IV Continuous <Continuous>  dextrose 50% Injectable 12.5 Gram(s) IV Push once  dextrose 50% Injectable 25 Gram(s) IV Push once  dextrose 50% Injectable 25 Gram(s) IV Push once  docusate sodium 100 milliGRAM(s) Oral daily  finasteride 5 milliGRAM(s) Oral daily  heparin  Injectable 5000 Unit(s) SubCutaneous every 8 hours  influenza   Vaccine 0.5 milliLiter(s) IntraMuscular once  insulin glargine Injectable (LANTUS) 5 Unit(s) SubCutaneous at bedtime  insulin lispro (HumaLOG) corrective regimen sliding scale   SubCutaneous three times a day before meals  insulin lispro (HumaLOG) corrective regimen sliding scale   SubCutaneous at bedtime  metoprolol succinate ER 50 milliGRAM(s) Oral daily  polyethylene glycol 3350 17 Gram(s) Oral daily  senna 2 Tablet(s) Oral at bedtime  tamsulosin 0.4 milliGRAM(s) Oral at bedtime    MEDICATIONS  (PRN):  acetaminophen   Tablet .. 650 milliGRAM(s) Oral every 6 hours PRN Temp greater or equal to 38C (100.4F), Moderate Pain (4 - 6)  dextrose 40% Gel 15 Gram(s) Oral once PRN Blood Glucose LESS THAN 70 milliGRAM(s)/deciliter  glucagon  Injectable 1 milliGRAM(s) IntraMuscular once PRN Glucose LESS THAN 70 milligrams/deciliter        CAPILLARY BLOOD GLUCOSE      POCT Blood Glucose.: 88 mg/dL (06 Oct 2018 08:33)  POCT Blood Glucose.: 136 mg/dL (05 Oct 2018 21:59)    I&O's Summary    06 Oct 2018 07:01  -  06 Oct 2018 21:17  --------------------------------------------------------  IN: 0 mL / OUT: 300 mL / NET: -300 mL        PHYSICAL EXAM:  GENERAL: NAD, well-developed  HEAD:  Atraumatic, Normocephalic  NECK: Supple, No JVD  CHEST/LUNG: Clear to auscultation bilaterally; No wheezing.  HEART: Regular rate and rhythm; No murmurs, rubs, or gallops  ABDOMEN: Soft, Nontender, Nondistended; Bowel sounds present  EXTREMITIES:   No clubbing, cyanosis, or edema  NEUROLOGY: AAO X 3  SKIN: No rashes    LABS:                        10.0   6.75  )-----------( 229      ( 06 Oct 2018 05:00 )             30.8     10    135  |  99  |  23  ----------------------------<  89  3.6   |  22  |  1.78<H>    Ca    7.7<L>      06 Oct 2018 05:00  Phos  2.8     10-06  Mg     2.0     10-06    TPro  6.2  /  Alb  2.4<L>  /  TBili  0.3  /  DBili  x   /  AST  27  /  ALT  22  /  AlkPhos  109  10          Urinalysis Basic - ( 06 Oct 2018 10:20 )    Color: YELLOW / Appearance: Lt TURBID / S.018 / pH: 6.0  Gluc: 50 / Ketone: SMALL  / Bili: NEGATIVE / Urobili: NORMAL   Blood: LARGE / Protein: 300 / Nitrite: NEGATIVE   Leuk Esterase: NEGATIVE / RBC: >50 / WBC 2-5   Sq Epi: x / Non Sq Epi: OCC / Bacteria: x      CAPILLARY BLOOD GLUCOSE      POCT Blood Glucose.: 88 mg/dL (06 Oct 2018 08:33)  POCT Blood Glucose.: 136 mg/dL (05 Oct 2018 21:59)    10-03 @ 06:20  Culture-urine --  Culture results --  method type --  Organism --  Organism Identification --  Specimen source BLOOD PERIPHERAL           10-03 @ 06:20  Culture blood   NO ORGANISMS ISOLATED  NO ORGANISMS ISOLATED AT 72 HRS.  Culture results --  Gram stain --  Gram stain blood --  Method type --  Organism --  Organism identification --  Specimen source BLOOD PERIPHERAL      RADIOLOGY & ADDITIONAL TESTS:    Imaging Personally Reviewed:    Consultant(s) Notes Reviewed:      Care Discussed with Consultants/Other Providers:

## 2018-10-06 NOTE — PROGRESS NOTE ADULT - SUBJECTIVE AND OBJECTIVE BOX
CARDIOLOGY FOLLOW UP NOTE - DR. PAYNE    Subjective:    no abd pain  no cp, sob      PHYSICAL EXAM:  T(C): 37.2 (10-06-18 @ 14:29), Max: 37.5 (10-05-18 @ 17:44)  HR: 77 (10-06-18 @ 14:29) (64 - 77)  BP: 144/73 (10-06-18 @ 14:29) (130/55 - 145/55)  RR: 17 (10-06-18 @ 14:29) (17 - 18)  SpO2: 90% (10-06-18 @ 14:29) (87% - 95%)  Wt(kg): --  I&O's Summary      Appearance: Normal	  Cardiovascular: Normal S1 S2,RRR, No JVD, No murmurs  Respiratory: Lungs clear to auscultation	  Gastrointestinal:  Soft, Non-tender, + BS	  Extremities: Normal range of motion, No clubbing, cyanosis or edema  Vascular: Peripheral pulses palpable 2+ bilaterally    MEDICATIONS  (STANDING):  amLODIPine   Tablet 10 milliGRAM(s) Oral daily  aspirin enteric coated 325 milliGRAM(s) Oral daily  dextrose 5%. 1000 milliLiter(s) (50 mL/Hr) IV Continuous <Continuous>  dextrose 50% Injectable 12.5 Gram(s) IV Push once  dextrose 50% Injectable 25 Gram(s) IV Push once  dextrose 50% Injectable 25 Gram(s) IV Push once  docusate sodium 100 milliGRAM(s) Oral daily  finasteride 5 milliGRAM(s) Oral daily  heparin  Injectable 5000 Unit(s) SubCutaneous every 8 hours  influenza   Vaccine 0.5 milliLiter(s) IntraMuscular once  insulin glargine Injectable (LANTUS) 5 Unit(s) SubCutaneous at bedtime  insulin lispro (HumaLOG) corrective regimen sliding scale   SubCutaneous three times a day before meals  insulin lispro (HumaLOG) corrective regimen sliding scale   SubCutaneous at bedtime  metoprolol succinate ER 50 milliGRAM(s) Oral daily  polyethylene glycol 3350 17 Gram(s) Oral daily  senna 2 Tablet(s) Oral at bedtime  tamsulosin 0.4 milliGRAM(s) Oral at bedtime      TELEMETRY: 	    ECG:  	  RADIOLOGY:   DIAGNOSTIC TESTING:  [ ] Echocardiogram:  [ ] Catheterization:  [ ] Stress Test:    OTHER: 	    LABS:	 	    CARDIAC MARKERS:                                10.0   6.75  )-----------( 229      ( 06 Oct 2018 05:00 )             30.8     10-06    135  |  99  |  23  ----------------------------<  89  3.6   |  22  |  1.78<H>    Ca    7.7<L>      06 Oct 2018 05:00  Phos  2.8     10-06  Mg     2.0     10-06    TPro  6.2  /  Alb  2.4<L>  /  TBili  0.3  /  DBili  x   /  AST  27  /  ALT  22  /  AlkPhos  109  10-06    proBNP:     Lipid Profile:   HgA1c:

## 2018-10-06 NOTE — PROGRESS NOTE ADULT - SUBJECTIVE AND OBJECTIVE BOX
[facility] [place] [facility] - Garret / Mary S /Gomez/ S Nataliia/ PATRICA Godwin/ Kane Powell / YELITZA Njeru  ---------------------------------------------------------------------------------------------------------------    Patient seen and examined bedside    Subjective and Objective: spiked fever T 39.2C last night. pt denies V/D/urianry sxs/sob. No complaints today. feeling better. abd pain resolved.  more vocal today    Allergies: No Known Allergies      Hospital Medications:   MEDICATIONS  (STANDING):  amLODIPine   Tablet 10 milliGRAM(s) Oral daily  aspirin enteric coated 325 milliGRAM(s) Oral daily  dextrose 5%. 1000 milliLiter(s) (50 mL/Hr) IV Continuous <Continuous>  dextrose 50% Injectable 12.5 Gram(s) IV Push once  dextrose 50% Injectable 25 Gram(s) IV Push once  dextrose 50% Injectable 25 Gram(s) IV Push once  docusate sodium 100 milliGRAM(s) Oral daily  finasteride 5 milliGRAM(s) Oral daily  heparin  Injectable 5000 Unit(s) SubCutaneous every 8 hours  influenza   Vaccine 0.5 milliLiter(s) IntraMuscular once  insulin glargine Injectable (LANTUS) 5 Unit(s) SubCutaneous at bedtime  insulin lispro (HumaLOG) corrective regimen sliding scale   SubCutaneous three times a day before meals  insulin lispro (HumaLOG) corrective regimen sliding scale   SubCutaneous at bedtime  metoprolol succinate ER 50 milliGRAM(s) Oral daily  polyethylene glycol 3350 17 Gram(s) Oral daily  senna 2 Tablet(s) Oral at bedtime  tamsulosin 0.4 milliGRAM(s) Oral at bedtime      VITALS:  T(F): 98.7 (10-06-18 @ 05:51), Max: 102.6 (10-05-18 @ 14:08)  HR: 65 (10-06-18 @ 05:51)  BP: 145/55 (10-06-18 @ 05:51)  RR: 18 (10-06-18 @ 05:51)  SpO2: 95% (10-06-18 @ 05:51)  Wt(kg): --    PHYSICAL EXAM:  Constitutional: NAD  HEENT: anicteric sclera, oropharynx clear  Neck: No JVD  Respiratory: CTAB, no wheezes, rales or rhonchi  Cardiovascular: S1, S2, RRR  Gastrointestinal: BS+, soft, NT/ND  Extremities: No cyanosis or clubbing. No peripheral edema  Neurological: A/O x 3, no focal deficits  Psychiatric: Normal mood, normal affect  : No CVA tenderness. No winter.   Skin: No rashes      LABS:  10-06    135  |  99  |  23  ----------------------------<  89  3.6   |  22  |  1.78<H>    Ca    7.7<L>      06 Oct 2018 05:00  Phos  2.8     10-06  Mg     2.0     10-06    TPro  6.2  /  Alb  2.4<L>  /  TBili  0.3  /  DBili      /  AST  27  /  ALT  22  /  AlkPhos  109  10-06    Creatinine Trend: 1.78 <--, 2.10 <--, 1.81 <--, 1.62 <--                        10.0   6.75  )-----------( 229      ( 06 Oct 2018 05:00 )             30.8     Urine Studies:  Urinalysis Basic - ( 06 Oct 2018 10:20 )    Color: YELLOW / Appearance: Lt TURBID / S.018 / pH: 6.0  Gluc: 50 / Ketone: SMALL  / Bili: NEGATIVE / Urobili: NORMAL   Blood: LARGE / Protein: 300 / Nitrite: NEGATIVE   Leuk Esterase: NEGATIVE / RBC: >50 / WBC 2-5   Sq Epi:  / Non Sq Epi: OCC / Bacteria:       Creatinine, Random Urine: 131.00 mg/dL (10-06 @ 10:10)  Sodium, Random Urine: 37 mmol/L (10-06 @ 10:10)  Protein, Random Urine: 387 mg/dL (10-06 @ 10:10)      RADIOLOGY & ADDITIONAL STUDIES:

## 2018-10-06 NOTE — PROGRESS NOTE ADULT - ASSESSMENT
83 yo male PMhx DM type 2 (insulin dependent), CAD s/pCABG (2016), HTN, BPH, recent CDU stay for ?pyelonephritis sent out on 2 weeks of ceftinir (June, 2018) presented 10/3 for 1 week of abdominal pain and now low grade fevers. Renal consult called today for worsening RFT.    TAINA on CKD3 b/l Cr 1.57 06/2018    trend sr Cr 1.6>1.8>2.1>1.78   TAINA likely 2/2 AIN from zosyn given fevers, microscopic hematuria, eosinophils in urine and improvement in RFT off Zosyn   rpt UA reviewed-lareg blood, rbc>50, protein 300, no e/o UTI, +eosinophils    no hydro on CT abd.  HTN, controlled  Fevers ?source- s/p Zosyn. blood cxs NTD  DM-Mx per medicine    labs, UA, rad, chart reviewed  strict u/o monitoring  continue to hold Lisinopril for now  avoid nephrotoxics/NSAIDs  dose all meds for eGFR <15ml/min  monitor BMP daily  avoid Zosyn  f/u final blood cxs, rpt blood cxs. f/u w/ ID   c/w BB, Norvasc

## 2018-10-07 LAB
ALBUMIN SERPL ELPH-MCNC: 2.3 G/DL — LOW (ref 3.3–5)
ALP SERPL-CCNC: 88 U/L — SIGNIFICANT CHANGE UP (ref 40–120)
ALT FLD-CCNC: 18 U/L — SIGNIFICANT CHANGE UP (ref 4–41)
AST SERPL-CCNC: 24 U/L — SIGNIFICANT CHANGE UP (ref 4–40)
BILIRUB SERPL-MCNC: 0.3 MG/DL — SIGNIFICANT CHANGE UP (ref 0.2–1.2)
BUN SERPL-MCNC: 21 MG/DL — SIGNIFICANT CHANGE UP (ref 7–23)
CALCIUM SERPL-MCNC: 7.9 MG/DL — LOW (ref 8.4–10.5)
CHLORIDE SERPL-SCNC: 101 MMOL/L — SIGNIFICANT CHANGE UP (ref 98–107)
CO2 SERPL-SCNC: 25 MMOL/L — SIGNIFICANT CHANGE UP (ref 22–31)
CREAT SERPL-MCNC: 1.56 MG/DL — HIGH (ref 0.5–1.3)
GLUCOSE BLDC GLUCOMTR-MCNC: 109 MG/DL — HIGH (ref 70–99)
GLUCOSE BLDC GLUCOMTR-MCNC: 120 MG/DL — HIGH (ref 70–99)
GLUCOSE BLDC GLUCOMTR-MCNC: 133 MG/DL — HIGH (ref 70–99)
GLUCOSE BLDC GLUCOMTR-MCNC: 182 MG/DL — HIGH (ref 70–99)
GLUCOSE BLDC GLUCOMTR-MCNC: 192 MG/DL — HIGH (ref 70–99)
GLUCOSE BLDC GLUCOMTR-MCNC: 73 MG/DL — SIGNIFICANT CHANGE UP (ref 70–99)
GLUCOSE SERPL-MCNC: 69 MG/DL — LOW (ref 70–99)
HCT VFR BLD CALC: 28.5 % — LOW (ref 39–50)
HGB BLD-MCNC: 9.1 G/DL — LOW (ref 13–17)
MAGNESIUM SERPL-MCNC: 2.1 MG/DL — SIGNIFICANT CHANGE UP (ref 1.6–2.6)
MCHC RBC-ENTMCNC: 26.8 PG — LOW (ref 27–34)
MCHC RBC-ENTMCNC: 31.9 % — LOW (ref 32–36)
MCV RBC AUTO: 83.8 FL — SIGNIFICANT CHANGE UP (ref 80–100)
NRBC # FLD: 0 — SIGNIFICANT CHANGE UP
PHOSPHATE SERPL-MCNC: 2.3 MG/DL — LOW (ref 2.5–4.5)
PLATELET # BLD AUTO: 233 K/UL — SIGNIFICANT CHANGE UP (ref 150–400)
PMV BLD: 10 FL — SIGNIFICANT CHANGE UP (ref 7–13)
POTASSIUM SERPL-MCNC: 3.6 MMOL/L — SIGNIFICANT CHANGE UP (ref 3.5–5.3)
POTASSIUM SERPL-SCNC: 3.6 MMOL/L — SIGNIFICANT CHANGE UP (ref 3.5–5.3)
PROT SERPL-MCNC: 5.9 G/DL — LOW (ref 6–8.3)
RBC # BLD: 3.4 M/UL — LOW (ref 4.2–5.8)
RBC # FLD: 13.1 % — SIGNIFICANT CHANGE UP (ref 10.3–14.5)
SODIUM SERPL-SCNC: 138 MMOL/L — SIGNIFICANT CHANGE UP (ref 135–145)
WBC # BLD: 5.88 K/UL — SIGNIFICANT CHANGE UP (ref 3.8–10.5)
WBC # FLD AUTO: 5.88 K/UL — SIGNIFICANT CHANGE UP (ref 3.8–10.5)

## 2018-10-07 RX ADMIN — AMLODIPINE BESYLATE 10 MILLIGRAM(S): 2.5 TABLET ORAL at 06:10

## 2018-10-07 RX ADMIN — HEPARIN SODIUM 5000 UNIT(S): 5000 INJECTION INTRAVENOUS; SUBCUTANEOUS at 06:10

## 2018-10-07 RX ADMIN — HEPARIN SODIUM 5000 UNIT(S): 5000 INJECTION INTRAVENOUS; SUBCUTANEOUS at 22:23

## 2018-10-07 RX ADMIN — Medication 1: at 17:37

## 2018-10-07 RX ADMIN — Medication 325 MILLIGRAM(S): at 13:11

## 2018-10-07 RX ADMIN — SENNA PLUS 2 TABLET(S): 8.6 TABLET ORAL at 22:23

## 2018-10-07 RX ADMIN — FINASTERIDE 5 MILLIGRAM(S): 5 TABLET, FILM COATED ORAL at 13:11

## 2018-10-07 RX ADMIN — INSULIN GLARGINE 5 UNIT(S): 100 INJECTION, SOLUTION SUBCUTANEOUS at 22:24

## 2018-10-07 RX ADMIN — TAMSULOSIN HYDROCHLORIDE 0.4 MILLIGRAM(S): 0.4 CAPSULE ORAL at 22:23

## 2018-10-07 RX ADMIN — Medication 50 MILLIGRAM(S): at 06:10

## 2018-10-07 RX ADMIN — HEPARIN SODIUM 5000 UNIT(S): 5000 INJECTION INTRAVENOUS; SUBCUTANEOUS at 13:12

## 2018-10-07 NOTE — PROGRESS NOTE ADULT - SUBJECTIVE AND OBJECTIVE BOX
[facility] [place] [facility] - Garret / Mary BURCIAGA /Gomez/ PATRICA William/ PATRICA Godwin/ Kane Powell / YELITZA Njeru  ---------------------------------------------------------------------------------------------------------------    Patient seen and examined bedside    Subjective and Objective: No overnight events. denies sob. No complaints today.     Allergies: No Known Allergies      Hospital Medications:   MEDICATIONS  (STANDING):  amLODIPine   Tablet 10 milliGRAM(s) Oral daily  aspirin enteric coated 325 milliGRAM(s) Oral daily  dextrose 5%. 1000 milliLiter(s) (50 mL/Hr) IV Continuous <Continuous>  dextrose 50% Injectable 12.5 Gram(s) IV Push once  dextrose 50% Injectable 25 Gram(s) IV Push once  dextrose 50% Injectable 25 Gram(s) IV Push once  docusate sodium 100 milliGRAM(s) Oral daily  finasteride 5 milliGRAM(s) Oral daily  heparin  Injectable 5000 Unit(s) SubCutaneous every 8 hours  influenza   Vaccine 0.5 milliLiter(s) IntraMuscular once  insulin glargine Injectable (LANTUS) 5 Unit(s) SubCutaneous at bedtime  insulin lispro (HumaLOG) corrective regimen sliding scale   SubCutaneous three times a day before meals  insulin lispro (HumaLOG) corrective regimen sliding scale   SubCutaneous at bedtime  metoprolol succinate ER 50 milliGRAM(s) Oral daily  polyethylene glycol 3350 17 Gram(s) Oral daily  senna 2 Tablet(s) Oral at bedtime  tamsulosin 0.4 milliGRAM(s) Oral at bedtime      VITALS:  T(F): 100.1 (10-07-18 @ 14:44), Max: 100.1 (10-07-18 @ 14:44)  HR: 72 (10-07-18 @ 14:44)  BP: 153/56 (10-07-18 @ 14:44)  RR: 18 (10-07-18 @ 14:44)  SpO2: 89% (10-07-18 @ 14:44)  Wt(kg): --    10-06 @ 07:01  -  10-07 @ 07:00  --------------------------------------------------------  IN: 0 mL / OUT: 550 mL / NET: -550 mL      PHYSICAL EXAM:  Constitutional: NAD  HEENT: anicteric sclera, oropharynx clear  Neck: No JVD  Respiratory: CTAB, no wheezes, rales or rhonchi  Cardiovascular: S1, S2, RRR  Gastrointestinal: BS+, soft, NT/ND  Extremities: No cyanosis or clubbing. No peripheral edema  Neurological: A/O x 2, no focal deficits  Psychiatric: Normal mood, normal affect  : No CVA tenderness.   Skin: No rashes      LABS:  10-07    138  |  101  |  21  ----------------------------<  69<L>  3.6   |  25  |  1.56<H>    Ca    7.9<L>      07 Oct 2018 06:57  Phos  2.3     10-07  Mg     2.1     10-07    TPro  5.9<L>  /  Alb  2.3<L>  /  TBili  0.3  /  DBili      /  AST  24  /  ALT  18  /  AlkPhos  88  10-07    Creatinine Trend: 1.56 <--, 1.78 <--, 2.10 <--, 1.81 <--, 1.62 <--                        9.1    5.88  )-----------( 233      ( 07 Oct 2018 06:57 )             28.5     Urine Studies:  Urinalysis Basic - ( 06 Oct 2018 10:20 )    Color: YELLOW / Appearance: Lt TURBID / S.018 / pH: 6.0  Gluc: 50 / Ketone: SMALL  / Bili: NEGATIVE / Urobili: NORMAL   Blood: LARGE / Protein: 300 / Nitrite: NEGATIVE   Leuk Esterase: NEGATIVE / RBC: >50 / WBC 2-5   Sq Epi:  / Non Sq Epi: OCC / Bacteria:       Creatinine, Random Urine: 131.00 mg/dL (10-06 @ 10:10)  Sodium, Random Urine: 37 mmol/L (10-06 @ 10:10)  Protein, Random Urine: 387 mg/dL (10-06 @ 10:10)      RADIOLOGY & ADDITIONAL STUDIES:

## 2018-10-07 NOTE — PROGRESS NOTE ADULT - SUBJECTIVE AND OBJECTIVE BOX
Patient is a 84y old  Male who presents with a chief complaint of abdominal pain (07 Oct 2018 12:36)      SUBJECTIVE / OVERNIGHT EVENTS:   Feels better.  Denies CP/SOB/Palpitation/HA.    MEDICATIONS  (STANDING):  amLODIPine   Tablet 10 milliGRAM(s) Oral daily  aspirin enteric coated 325 milliGRAM(s) Oral daily  dextrose 5%. 1000 milliLiter(s) (50 mL/Hr) IV Continuous <Continuous>  dextrose 50% Injectable 12.5 Gram(s) IV Push once  dextrose 50% Injectable 25 Gram(s) IV Push once  dextrose 50% Injectable 25 Gram(s) IV Push once  docusate sodium 100 milliGRAM(s) Oral daily  finasteride 5 milliGRAM(s) Oral daily  heparin  Injectable 5000 Unit(s) SubCutaneous every 8 hours  influenza   Vaccine 0.5 milliLiter(s) IntraMuscular once  insulin glargine Injectable (LANTUS) 5 Unit(s) SubCutaneous at bedtime  insulin lispro (HumaLOG) corrective regimen sliding scale   SubCutaneous three times a day before meals  insulin lispro (HumaLOG) corrective regimen sliding scale   SubCutaneous at bedtime  metoprolol succinate ER 50 milliGRAM(s) Oral daily  polyethylene glycol 3350 17 Gram(s) Oral daily  senna 2 Tablet(s) Oral at bedtime  tamsulosin 0.4 milliGRAM(s) Oral at bedtime    MEDICATIONS  (PRN):  acetaminophen   Tablet .. 650 milliGRAM(s) Oral every 6 hours PRN Temp greater or equal to 38C (100.4F), Moderate Pain (4 - 6)  dextrose 40% Gel 15 Gram(s) Oral once PRN Blood Glucose LESS THAN 70 milliGRAM(s)/deciliter  glucagon  Injectable 1 milliGRAM(s) IntraMuscular once PRN Glucose LESS THAN 70 milligrams/deciliter        CAPILLARY BLOOD GLUCOSE  120 (07 Oct 2018 12:42)  151 (06 Oct 2018 22:27)      POCT Blood Glucose.: 120 mg/dL (07 Oct 2018 12:39)  POCT Blood Glucose.: 73 mg/dL (07 Oct 2018 08:43)  POCT Blood Glucose.: 182 mg/dL (06 Oct 2018 17:25)    I&O's Summary    06 Oct 2018 07:01  -  07 Oct 2018 07:00  --------------------------------------------------------  IN: 0 mL / OUT: 550 mL / NET: -550 mL        PHYSICAL EXAM:  GENERAL: NAD, well-developed  HEAD:  Atraumatic, Normocephalic  NECK: Supple, No JVD  CHEST/LUNG: Clear to auscultation bilaterally; No wheezing.  HEART: Regular rate and rhythm; No murmurs, rubs, or gallops  ABDOMEN: Soft, Nontender, Nondistended; Bowel sounds present  EXTREMITIES:   No clubbing, cyanosis, or edema  NEUROLOGY: AAO X 3  SKIN: No rashes    LABS:                        9.1    5.88  )-----------( 233      ( 07 Oct 2018 06:57 )             28.5     10-07    138  |  101  |  21  ----------------------------<  69<L>  3.6   |  25  |  1.56<H>    Ca    7.9<L>      07 Oct 2018 06:57  Phos  2.3     10-07  Mg     2.1     10-07    TPro  5.9<L>  /  Alb  2.3<L>  /  TBili  0.3  /  DBili  x   /  AST  24  /  ALT  18  /  AlkPhos  88  10-07          Urinalysis Basic - ( 06 Oct 2018 10:20 )    Color: YELLOW / Appearance: Lt TURBID / S.018 / pH: 6.0  Gluc: 50 / Ketone: SMALL  / Bili: NEGATIVE / Urobili: NORMAL   Blood: LARGE / Protein: 300 / Nitrite: NEGATIVE   Leuk Esterase: NEGATIVE / RBC: >50 / WBC 2-5   Sq Epi: x / Non Sq Epi: OCC / Bacteria: x      CAPILLARY BLOOD GLUCOSE  120 (07 Oct 2018 12:42)  151 (06 Oct 2018 22:27)      POCT Blood Glucose.: 120 mg/dL (07 Oct 2018 12:39)  POCT Blood Glucose.: 73 mg/dL (07 Oct 2018 08:43)  POCT Blood Glucose.: 182 mg/dL (06 Oct 2018 17:25)    10-03 @ 06:20  Culture-urine --  Culture results --  method type --  Organism --  Organism Identification --  Specimen source BLOOD PERIPHERAL           10-03 @ 06:20  Culture blood   NO ORGANISMS ISOLATED  NO ORGANISMS ISOLATED AT 96 HOURS  Culture results --  Gram stain --  Gram stain blood --  Method type --  Organism --  Organism identification --  Specimen source BLOOD PERIPHERAL      RADIOLOGY & ADDITIONAL TESTS:    Imaging Personally Reviewed:    Consultant(s) Notes Reviewed:      Care Discussed with Consultants/Other Providers:

## 2018-10-07 NOTE — PROGRESS NOTE ADULT - SUBJECTIVE AND OBJECTIVE BOX
CARDIOLOGY FOLLOW UP NOTE - DR. PAYNE    Subjective:  no cp, sob  no abd pain    PHYSICAL EXAM:  T(C): 36.8 (10-07-18 @ 06:09), Max: 37.2 (10-06-18 @ 14:29)  HR: 62 (10-07-18 @ 06:09) (62 - 77)  BP: 131/57 (10-07-18 @ 06:09) (131/57 - 144/73)  RR: 18 (10-07-18 @ 06:09) (17 - 18)  SpO2: 90% (10-07-18 @ 06:09) (89% - 90%)  Wt(kg): --  I&O's Summary    06 Oct 2018 07:01  -  07 Oct 2018 07:00  --------------------------------------------------------  IN: 0 mL / OUT: 550 mL / NET: -550 mL        Appearance: Normal	  Cardiovascular: Normal S1 S2,RRR, No JVD, No murmurs  Respiratory: Lungs clear to auscultation	  Gastrointestinal:  Soft, Non-tender, + BS	  Extremities: Normal range of motion, No clubbing, cyanosis or edema  Vascular: Peripheral pulses palpable 2+ bilaterally    MEDICATIONS  (STANDING):  amLODIPine   Tablet 10 milliGRAM(s) Oral daily  aspirin enteric coated 325 milliGRAM(s) Oral daily  dextrose 5%. 1000 milliLiter(s) (50 mL/Hr) IV Continuous <Continuous>  dextrose 50% Injectable 12.5 Gram(s) IV Push once  dextrose 50% Injectable 25 Gram(s) IV Push once  dextrose 50% Injectable 25 Gram(s) IV Push once  docusate sodium 100 milliGRAM(s) Oral daily  finasteride 5 milliGRAM(s) Oral daily  heparin  Injectable 5000 Unit(s) SubCutaneous every 8 hours  influenza   Vaccine 0.5 milliLiter(s) IntraMuscular once  insulin glargine Injectable (LANTUS) 5 Unit(s) SubCutaneous at bedtime  insulin lispro (HumaLOG) corrective regimen sliding scale   SubCutaneous three times a day before meals  insulin lispro (HumaLOG) corrective regimen sliding scale   SubCutaneous at bedtime  metoprolol succinate ER 50 milliGRAM(s) Oral daily  polyethylene glycol 3350 17 Gram(s) Oral daily  senna 2 Tablet(s) Oral at bedtime  tamsulosin 0.4 milliGRAM(s) Oral at bedtime      TELEMETRY: 	    ECG:  	  RADIOLOGY:   DIAGNOSTIC TESTING:  [ ] Echocardiogram:  [ ] Catheterization:  [ ] Stress Test:    OTHER: 	    LABS:	 	    CARDIAC MARKERS:                                9.1    5.88  )-----------( 233      ( 07 Oct 2018 06:57 )             28.5     10-07    138  |  101  |  21  ----------------------------<  69<L>  3.6   |  25  |  1.56<H>    Ca    7.9<L>      07 Oct 2018 06:57  Phos  2.3     10-07  Mg     2.1     10-07    TPro  5.9<L>  /  Alb  2.3<L>  /  TBili  0.3  /  DBili  x   /  AST  24  /  ALT  18  /  AlkPhos  88  10-07    proBNP:     Lipid Profile:   HgA1c:

## 2018-10-07 NOTE — PROGRESS NOTE ADULT - ASSESSMENT
83 yo male PMhx DM type 2 (insulin dependent), CAD s/pCABG (2016), HTN, BPH, recent CDU stay for ?pyelonephritis sent out on 2 weeks of ceftinir (June, 2018) presented 10/3 for 1 week of abdominal pain and now low grade fevers. Renal consult called today for worsening RFT.    TAINA on CKD3 b/l Cr 1.57 06/2018   TAINA resolving. sr Cr trend 1.6>1.8>2.1>1.78>1.56   TAINA likely 2/2 AIN from zosyn given fevers, microscopic hematuria, eosinophils in urine and improvement in RFT off Zosyn   rpt UA reviewed-lareg blood, rbc>50, protein 300, no e/o UTI, +eosinophils    no hydro on CT abd.  HTN, controlled  Fevers ?source- s/p Zosyn. blood cxs NTD  DM-Mx per medicine    labs, UA, rad, chart reviewed  strict u/o monitoring  continue to hold Lisinopril for now  avoid nephrotoxics/NSAIDs  dose all meds for eGFR <15ml/min  monitor BMP daily  avoid Zosyn  f/u final blood cxs, rpt blood cxs. f/u w/ ID   c/w BB, Norvasc

## 2018-10-07 NOTE — PROGRESS NOTE ADULT - ASSESSMENT
· Assessment	  85 yo PMHx as above admitted with abdominal pain, fevers.    TAINA:  BMP  Nephro f/up.     Problem/Plan - 1:  ·  Problem: Generalized abdominal pain.  Plan: unclear abdominal pain.   -patient with fever and abdominal pain history, negative hanna sign, no abdominal pain, guarding or rigidity.  - CT Abd: No source of infection       Problem/Plan - 3:  ·  Problem: Type 2 diabetes mellitus without complication, with long-term current use of insulin.  Plan: hypoglycemic todayl ikely in setting of poor PO intake without adjustment of insulin.  - FSSS     Problem/Plan - 4:  ·  Problem: CKD (chronic kidney disease), stage III.  Plan: avoid nephrotoxic agents, continue to monitor.      Problem/Plan - 5:  ·  Problem: Essential hypertension.  Plan:  metoprolol, lisinopril.

## 2018-10-08 VITALS
RESPIRATION RATE: 18 BRPM | HEART RATE: 77 BPM | OXYGEN SATURATION: 96 % | TEMPERATURE: 99 F | DIASTOLIC BLOOD PRESSURE: 65 MMHG | SYSTOLIC BLOOD PRESSURE: 153 MMHG

## 2018-10-08 LAB
ALBUMIN SERPL ELPH-MCNC: 2.3 G/DL — LOW (ref 3.3–5)
ALP SERPL-CCNC: 81 U/L — SIGNIFICANT CHANGE UP (ref 40–120)
ALT FLD-CCNC: 17 U/L — SIGNIFICANT CHANGE UP (ref 4–41)
AST SERPL-CCNC: 25 U/L — SIGNIFICANT CHANGE UP (ref 4–40)
BACTERIA BLD CULT: SIGNIFICANT CHANGE UP
BACTERIA BLD CULT: SIGNIFICANT CHANGE UP
BILIRUB SERPL-MCNC: 0.3 MG/DL — SIGNIFICANT CHANGE UP (ref 0.2–1.2)
BUN SERPL-MCNC: 19 MG/DL — SIGNIFICANT CHANGE UP (ref 7–23)
CALCIUM SERPL-MCNC: 7.9 MG/DL — LOW (ref 8.4–10.5)
CHLORIDE SERPL-SCNC: 103 MMOL/L — SIGNIFICANT CHANGE UP (ref 98–107)
CO2 SERPL-SCNC: 24 MMOL/L — SIGNIFICANT CHANGE UP (ref 22–31)
CREAT SERPL-MCNC: 1.58 MG/DL — HIGH (ref 0.5–1.3)
GLUCOSE BLDC GLUCOMTR-MCNC: 119 MG/DL — HIGH (ref 70–99)
GLUCOSE BLDC GLUCOMTR-MCNC: 151 MG/DL — HIGH (ref 70–99)
GLUCOSE BLDC GLUCOMTR-MCNC: 86 MG/DL — SIGNIFICANT CHANGE UP (ref 70–99)
GLUCOSE SERPL-MCNC: 72 MG/DL — SIGNIFICANT CHANGE UP (ref 70–99)
HCT VFR BLD CALC: 27.8 % — LOW (ref 39–50)
HGB BLD-MCNC: 8.7 G/DL — LOW (ref 13–17)
MAGNESIUM SERPL-MCNC: 2.1 MG/DL — SIGNIFICANT CHANGE UP (ref 1.6–2.6)
MCHC RBC-ENTMCNC: 25.9 PG — LOW (ref 27–34)
MCHC RBC-ENTMCNC: 31.3 % — LOW (ref 32–36)
MCV RBC AUTO: 82.7 FL — SIGNIFICANT CHANGE UP (ref 80–100)
NRBC # FLD: 0 — SIGNIFICANT CHANGE UP
PHOSPHATE SERPL-MCNC: 2.3 MG/DL — LOW (ref 2.5–4.5)
PLATELET # BLD AUTO: 258 K/UL — SIGNIFICANT CHANGE UP (ref 150–400)
PMV BLD: 10.4 FL — SIGNIFICANT CHANGE UP (ref 7–13)
POTASSIUM SERPL-MCNC: 3.6 MMOL/L — SIGNIFICANT CHANGE UP (ref 3.5–5.3)
POTASSIUM SERPL-SCNC: 3.6 MMOL/L — SIGNIFICANT CHANGE UP (ref 3.5–5.3)
PROT SERPL-MCNC: 5.8 G/DL — LOW (ref 6–8.3)
RBC # BLD: 3.36 M/UL — LOW (ref 4.2–5.8)
RBC # FLD: 13.1 % — SIGNIFICANT CHANGE UP (ref 10.3–14.5)
SODIUM SERPL-SCNC: 139 MMOL/L — SIGNIFICANT CHANGE UP (ref 135–145)
WBC # BLD: 5.24 K/UL — SIGNIFICANT CHANGE UP (ref 3.8–10.5)
WBC # FLD AUTO: 5.24 K/UL — SIGNIFICANT CHANGE UP (ref 3.8–10.5)

## 2018-10-08 PROCEDURE — 99232 SBSQ HOSP IP/OBS MODERATE 35: CPT

## 2018-10-08 RX ORDER — LISINOPRIL 2.5 MG/1
1 TABLET ORAL
Qty: 0 | Refills: 0 | COMMUNITY

## 2018-10-08 RX ORDER — INSULIN GLARGINE 100 [IU]/ML
12 INJECTION, SOLUTION SUBCUTANEOUS
Qty: 0 | Refills: 0 | DISCHARGE
Start: 2018-10-08

## 2018-10-08 RX ORDER — METFORMIN HYDROCHLORIDE 850 MG/1
1 TABLET ORAL
Qty: 0 | Refills: 0 | COMMUNITY

## 2018-10-08 RX ORDER — DOCUSATE SODIUM 100 MG
1 CAPSULE ORAL
Qty: 0 | Refills: 0 | COMMUNITY
Start: 2018-10-08

## 2018-10-08 RX ORDER — SODIUM,POTASSIUM PHOSPHATES 278-250MG
1 POWDER IN PACKET (EA) ORAL
Qty: 0 | Refills: 0 | Status: DISCONTINUED | OUTPATIENT
Start: 2018-10-08 | End: 2018-10-08

## 2018-10-08 RX ORDER — SENNA PLUS 8.6 MG/1
2 TABLET ORAL
Qty: 0 | Refills: 0 | COMMUNITY
Start: 2018-10-08

## 2018-10-08 RX ORDER — INSULIN GLARGINE 100 [IU]/ML
15 INJECTION, SOLUTION SUBCUTANEOUS
Qty: 0 | Refills: 0 | COMMUNITY

## 2018-10-08 RX ORDER — POLYETHYLENE GLYCOL 3350 17 G/17G
17 POWDER, FOR SOLUTION ORAL
Qty: 0 | Refills: 0 | COMMUNITY
Start: 2018-10-08

## 2018-10-08 RX ORDER — INSULIN GLARGINE 100 [IU]/ML
6 INJECTION, SOLUTION SUBCUTANEOUS
Qty: 0 | Refills: 0 | COMMUNITY
Start: 2018-10-08

## 2018-10-08 RX ADMIN — AMLODIPINE BESYLATE 10 MILLIGRAM(S): 2.5 TABLET ORAL at 05:02

## 2018-10-08 RX ADMIN — Medication 1 TABLET(S): at 13:08

## 2018-10-08 RX ADMIN — Medication 325 MILLIGRAM(S): at 13:07

## 2018-10-08 RX ADMIN — POLYETHYLENE GLYCOL 3350 17 GRAM(S): 17 POWDER, FOR SOLUTION ORAL at 13:08

## 2018-10-08 RX ADMIN — HEPARIN SODIUM 5000 UNIT(S): 5000 INJECTION INTRAVENOUS; SUBCUTANEOUS at 15:10

## 2018-10-08 RX ADMIN — FINASTERIDE 5 MILLIGRAM(S): 5 TABLET, FILM COATED ORAL at 13:08

## 2018-10-08 RX ADMIN — HEPARIN SODIUM 5000 UNIT(S): 5000 INJECTION INTRAVENOUS; SUBCUTANEOUS at 05:02

## 2018-10-08 RX ADMIN — Medication 50 MILLIGRAM(S): at 05:02

## 2018-10-08 RX ADMIN — Medication 100 MILLIGRAM(S): at 13:07

## 2018-10-08 NOTE — PROGRESS NOTE ADULT - ASSESSMENT
83 yo m with DM type 2 (insulin dependent), CAD s/pCABG (2016), HTN, BPH, s/p CDU stay 6/2018 for UTI presenting on 10/3 for epigastric pain lasting several hours after eating dinner.   noted febrile > 102 on 10/4 and 10/5.  Unclear etiology.   No localizing findings on exam or imaging.   ?infection. ?drug related. ?non infectious.     UA neg.   blood cultures neg.    cxr neg.  CT stable bile duct dilatation, pancreas cyst.  No obvious focus of infection.        TAINA thought AIN related to zosyn  eos + in urine.         Suggest:  - if spikes fever would repeat blood cultures  - check stool PCR  - would observe off antibiotic for now        I will be away 10/9.  ID service will follow.      Nuris Patel MD  Pager: [phone]  After 5 PM or weekends please call fellow on call or office [phone]

## 2018-10-08 NOTE — PROGRESS NOTE ADULT - SUBJECTIVE AND OBJECTIVE BOX
[facility] - Garret / Mary BURCIAGA /Gomez/ PATRICA William/ PATRICA Godwin/ Kane Powell / YELITZA Njeru  ---------------------------------------------------------------------------------------------------------------    Patient seen and examined bedside    Subjective and Objective: No overnight events, denies sob. No complaints today. feeling better    Allergies: No Known Allergies      Hospital Medications:   MEDICATIONS  (STANDING):  amLODIPine   Tablet 10 milliGRAM(s) Oral daily  aspirin enteric coated 325 milliGRAM(s) Oral daily  dextrose 5%. 1000 milliLiter(s) (50 mL/Hr) IV Continuous <Continuous>  dextrose 50% Injectable 12.5 Gram(s) IV Push once  dextrose 50% Injectable 25 Gram(s) IV Push once  dextrose 50% Injectable 25 Gram(s) IV Push once  docusate sodium 100 milliGRAM(s) Oral daily  finasteride 5 milliGRAM(s) Oral daily  heparin  Injectable 5000 Unit(s) SubCutaneous every 8 hours  influenza   Vaccine 0.5 milliLiter(s) IntraMuscular once  insulin glargine Injectable (LANTUS) 5 Unit(s) SubCutaneous at bedtime  insulin lispro (HumaLOG) corrective regimen sliding scale   SubCutaneous three times a day before meals  insulin lispro (HumaLOG) corrective regimen sliding scale   SubCutaneous at bedtime  metoprolol succinate ER 50 milliGRAM(s) Oral daily  polyethylene glycol 3350 17 Gram(s) Oral daily  potassium acid phosphate/sodium acid phosphate tablet (K-PHOS No. 2) 1 Tablet(s) Oral three times a day with meals  senna 2 Tablet(s) Oral at bedtime  tamsulosin 0.4 milliGRAM(s) Oral at bedtime      VITALS:  T(F): 98.7 (10-08-18 @ 14:27), Max: 99.7 (10-08-18 @ 05:00)  HR: 77 (10-08-18 @ 14:27)  BP: 153/65 (10-08-18 @ 14:27)  RR: 18 (10-08-18 @ 14:27)  SpO2: 96% (10-08-18 @ 14:27)  Wt(kg): --    10-07 @ 07:01  -  10-08 @ 07:00  --------------------------------------------------------  IN: 0 mL / OUT: 600 mL / NET: -600 mL      PHYSICAL EXAM:  Constitutional: NAD  HEENT: anicteric sclera, oropharynx clear  Neck: No JVD  Respiratory: CTAB, no wheezes, rales or rhonchi  Cardiovascular: S1, S2, RRR  Gastrointestinal: BS+, soft, NT/ND  Extremities: No cyanosis or clubbing. trace peripheral edema  Neurological: A/O x 2, no focal deficits  Psychiatric: Normal mood, normal affect  : No CVA tenderness. No winter.   Skin: No rashes      LABS:  10-08    139  |  103  |  19  ----------------------------<  72  3.6   |  24  |  1.58<H>    Ca    7.9<L>      08 Oct 2018 04:16  Phos  2.3     10-08  Mg     2.1     10-08    TPro  5.8<L>  /  Alb  2.3<L>  /  TBili  0.3  /  DBili      /  AST  25  /  ALT  17  /  AlkPhos  81  10-08    Creatinine Trend: 1.58 <--, 1.56 <--, 1.78 <--, 2.10 <--, 1.81 <--, 1.62 <--                        8.7    5.24  )-----------( 258      ( 08 Oct 2018 04:16 )             27.8     Urine Studies:  Urinalysis Basic - ( 06 Oct 2018 10:20 )    Color: YELLOW / Appearance: Lt TURBID / S.018 / pH: 6.0  Gluc: 50 / Ketone: SMALL  / Bili: NEGATIVE / Urobili: NORMAL   Blood: LARGE / Protein: 300 / Nitrite: NEGATIVE   Leuk Esterase: NEGATIVE / RBC: >50 / WBC 2-5   Sq Epi:  / Non Sq Epi: OCC / Bacteria:       Creatinine, Random Urine: 131.00 mg/dL (10-06 @ 10:10)  Sodium, Random Urine: 37 mmol/L (10-06 @ 10:10)  Protein, Random Urine: 387 mg/dL (10-06 @ 10:10)      RADIOLOGY & ADDITIONAL STUDIES:

## 2018-10-08 NOTE — PROGRESS NOTE ADULT - SUBJECTIVE AND OBJECTIVE BOX
CARDIOLOGY FOLLOW UP - Dr. Villarreal    CC no cp/sob       PHYSICAL EXAM:  T(C): 37.6 (10-08-18 @ 05:00), Max: 37.8 (10-07-18 @ 14:44)  HR: 80 (10-08-18 @ 05:00) (68 - 80)  BP: 148/53 (10-08-18 @ 05:00) (148/53 - 153/56)  RR: 18 (10-08-18 @ 05:00) (18 - 18)  SpO2: 94% (10-08-18 @ 05:00) (89% - 94%)  Wt(kg): --  I&O's Summary    07 Oct 2018 07:01  -  08 Oct 2018 07:00  --------------------------------------------------------  IN: 0 mL / OUT: 600 mL / NET: -600 mL        Appearance: Normal	  Cardiovascular: Normal S1 S2,RRR, No JVD, No murmurs  Respiratory: Lungs clear to auscultation	  Gastrointestinal:  Soft, Non-tender, + BS	  Extremities: Normal range of motion, No clubbing, cyanosis or edema        MEDICATIONS  (STANDING):  amLODIPine   Tablet 10 milliGRAM(s) Oral daily  aspirin enteric coated 325 milliGRAM(s) Oral daily  dextrose 5%. 1000 milliLiter(s) (50 mL/Hr) IV Continuous <Continuous>  dextrose 50% Injectable 12.5 Gram(s) IV Push once  dextrose 50% Injectable 25 Gram(s) IV Push once  dextrose 50% Injectable 25 Gram(s) IV Push once  docusate sodium 100 milliGRAM(s) Oral daily  finasteride 5 milliGRAM(s) Oral daily  heparin  Injectable 5000 Unit(s) SubCutaneous every 8 hours  influenza   Vaccine 0.5 milliLiter(s) IntraMuscular once  insulin glargine Injectable (LANTUS) 5 Unit(s) SubCutaneous at bedtime  insulin lispro (HumaLOG) corrective regimen sliding scale   SubCutaneous three times a day before meals  insulin lispro (HumaLOG) corrective regimen sliding scale   SubCutaneous at bedtime  metoprolol succinate ER 50 milliGRAM(s) Oral daily  polyethylene glycol 3350 17 Gram(s) Oral daily  potassium acid phosphate/sodium acid phosphate tablet (K-PHOS No. 2) 1 Tablet(s) Oral three times a day with meals  senna 2 Tablet(s) Oral at bedtime  tamsulosin 0.4 milliGRAM(s) Oral at bedtime      TELEMETRY:  	    ECG:  	  RADIOLOGY:   DIAGNOSTIC TESTING:  [ ] Echocardiogram:  [ ]  Catheterization:  [ ] Stress Test:    OTHER: 	    LABS:	 	                                8.7    5.24  )-----------( 258      ( 08 Oct 2018 04:16 )             27.8     10-08    139  |  103  |  19  ----------------------------<  72  3.6   |  24  |  1.58<H>    Ca    7.9<L>      08 Oct 2018 04:16  Phos  2.3     10-08  Mg     2.1     10-08    TPro  5.8<L>  /  Alb  2.3<L>  /  TBili  0.3  /  DBili  x   /  AST  25  /  ALT  17  /  AlkPhos  81  10-08

## 2018-10-08 NOTE — PROGRESS NOTE ADULT - REASON FOR ADMISSION
abdominal pain

## 2018-10-08 NOTE — PROGRESS NOTE ADULT - ASSESSMENT
83 yo male PMhx DM type 2 (insulin dependent), CAD s/pCABG (2016), HTN, BPH, recent CDU stay for ?pyelonephritis sent out on 2 weeks of ceftinir (June, 2018) presented 10/3 for 1 week of abdominal pain and now low grade fevers. Renal consult called today for worsening RFT.    TAINA on CKD3 b/l Cr 1.57 06/2018   TAINA resolving. sr Cr trend 1.6>1.8>2.1>1.78>1.56>1.58   TAINA likely 2/2 AIN from zosyn given fevers, microscopic hematuria, eosinophils in urine and improvement in RFT off Zosyn   rpt UA reviewed-lareg blood, rbc>50, protein 300, no e/o UTI, +eosinophils    no hydro on CT abd.  HTN, controlled  Fevers ?source- s/p Zosyn. blood cxs NTD. f/u final blood cxs, off abxs, afebrile now. f/u w/ ID   DM-Mx per medicine    labs, UA, rad, chart reviewed  strict u/o monitoring  continue to hold Lisinopril for now. If Cr remains stable tomorrow, pl resume Lisinopril   avoid nephrotoxics/NSAIDs  dose all meds for eGFR <15ml/min  monitor BMP daily  avoid Zosyn  c/w BB, Norvasc

## 2018-11-23 ENCOUNTER — INPATIENT (INPATIENT)
Facility: HOSPITAL | Age: 83
LOS: 2 days | Discharge: HOME | End: 2018-11-26
Attending: INTERNAL MEDICINE | Admitting: INTERNAL MEDICINE
Payer: MEDICARE

## 2018-11-23 VITALS
OXYGEN SATURATION: 91 % | HEART RATE: 79 BPM | TEMPERATURE: 99 F | RESPIRATION RATE: 16 BRPM | SYSTOLIC BLOOD PRESSURE: 114 MMHG | DIASTOLIC BLOOD PRESSURE: 72 MMHG

## 2018-11-23 DIAGNOSIS — Z29.9 ENCOUNTER FOR PROPHYLACTIC MEASURES, UNSPECIFIED: ICD-10-CM

## 2018-11-23 DIAGNOSIS — Z90.49 ACQUIRED ABSENCE OF OTHER SPECIFIED PARTS OF DIGESTIVE TRACT: Chronic | ICD-10-CM

## 2018-11-23 DIAGNOSIS — I50.9 HEART FAILURE, UNSPECIFIED: ICD-10-CM

## 2018-11-23 DIAGNOSIS — E11.9 TYPE 2 DIABETES MELLITUS WITHOUT COMPLICATIONS: ICD-10-CM

## 2018-11-23 DIAGNOSIS — I10 ESSENTIAL (PRIMARY) HYPERTENSION: ICD-10-CM

## 2018-11-23 DIAGNOSIS — I25.10 ATHEROSCLEROTIC HEART DISEASE OF NATIVE CORONARY ARTERY WITHOUT ANGINA PECTORIS: ICD-10-CM

## 2018-11-23 DIAGNOSIS — N40.0 BENIGN PROSTATIC HYPERPLASIA WITHOUT LOWER URINARY TRACT SYMPTOMS: ICD-10-CM

## 2018-11-23 LAB
ALBUMIN SERPL ELPH-MCNC: 3.1 G/DL — LOW (ref 3.3–5)
ALP SERPL-CCNC: 103 U/L — SIGNIFICANT CHANGE UP (ref 40–120)
ALT FLD-CCNC: 13 U/L — SIGNIFICANT CHANGE UP (ref 4–41)
AST SERPL-CCNC: 24 U/L — SIGNIFICANT CHANGE UP (ref 4–40)
BASE EXCESS BLDV CALC-SCNC: 1.5 MMOL/L — SIGNIFICANT CHANGE UP
BASOPHILS # BLD AUTO: 0.03 K/UL — SIGNIFICANT CHANGE UP (ref 0–0.2)
BASOPHILS NFR BLD AUTO: 0.6 % — SIGNIFICANT CHANGE UP (ref 0–2)
BILIRUB SERPL-MCNC: 0.2 MG/DL — SIGNIFICANT CHANGE UP (ref 0.2–1.2)
BLOOD GAS VENOUS - CREATININE: 1.45 MG/DL — HIGH (ref 0.5–1.3)
BUN SERPL-MCNC: 21 MG/DL — SIGNIFICANT CHANGE UP (ref 7–23)
CALCIUM SERPL-MCNC: 8.5 MG/DL — SIGNIFICANT CHANGE UP (ref 8.4–10.5)
CHLORIDE BLDV-SCNC: 104 MMOL/L — SIGNIFICANT CHANGE UP (ref 96–108)
CHLORIDE SERPL-SCNC: 101 MMOL/L — SIGNIFICANT CHANGE UP (ref 98–107)
CK SERPL-CCNC: 64 U/L — SIGNIFICANT CHANGE UP (ref 30–200)
CO2 SERPL-SCNC: 25 MMOL/L — SIGNIFICANT CHANGE UP (ref 22–31)
CREAT SERPL-MCNC: 1.53 MG/DL — HIGH (ref 0.5–1.3)
EOSINOPHIL # BLD AUTO: 0.13 K/UL — SIGNIFICANT CHANGE UP (ref 0–0.5)
EOSINOPHIL NFR BLD AUTO: 2.5 % — SIGNIFICANT CHANGE UP (ref 0–6)
GAS PNL BLDV: 136 MMOL/L — SIGNIFICANT CHANGE UP (ref 136–146)
GLUCOSE BLDV-MCNC: 163 — HIGH (ref 70–99)
GLUCOSE SERPL-MCNC: 164 MG/DL — HIGH (ref 70–99)
HCO3 BLDV-SCNC: 25 MMOL/L — SIGNIFICANT CHANGE UP (ref 20–27)
HCT VFR BLD CALC: 29 % — LOW (ref 39–50)
HCT VFR BLDV CALC: 30.1 % — LOW (ref 39–51)
HGB BLD-MCNC: 9.1 G/DL — LOW (ref 13–17)
HGB BLDV-MCNC: 9.7 G/DL — LOW (ref 13–17)
IMM GRANULOCYTES # BLD AUTO: 0.03 # — SIGNIFICANT CHANGE UP
IMM GRANULOCYTES NFR BLD AUTO: 0.6 % — SIGNIFICANT CHANGE UP (ref 0–1.5)
LACTATE BLDV-MCNC: 2 MMOL/L — SIGNIFICANT CHANGE UP (ref 0.5–2)
LYMPHOCYTES # BLD AUTO: 0.68 K/UL — LOW (ref 1–3.3)
LYMPHOCYTES # BLD AUTO: 13.3 % — SIGNIFICANT CHANGE UP (ref 13–44)
MCHC RBC-ENTMCNC: 26.1 PG — LOW (ref 27–34)
MCHC RBC-ENTMCNC: 31.4 % — LOW (ref 32–36)
MCV RBC AUTO: 83.3 FL — SIGNIFICANT CHANGE UP (ref 80–100)
MONOCYTES # BLD AUTO: 0.54 K/UL — SIGNIFICANT CHANGE UP (ref 0–0.9)
MONOCYTES NFR BLD AUTO: 10.6 % — SIGNIFICANT CHANGE UP (ref 2–14)
NEUTROPHILS # BLD AUTO: 3.69 K/UL — SIGNIFICANT CHANGE UP (ref 1.8–7.4)
NEUTROPHILS NFR BLD AUTO: 72.4 % — SIGNIFICANT CHANGE UP (ref 43–77)
NRBC # FLD: 0 — SIGNIFICANT CHANGE UP
NT-PROBNP SERPL-SCNC: 3735 PG/ML — SIGNIFICANT CHANGE UP
PCO2 BLDV: 46 MMHG — SIGNIFICANT CHANGE UP (ref 41–51)
PH BLDV: 7.37 PH — SIGNIFICANT CHANGE UP (ref 7.32–7.43)
PLATELET # BLD AUTO: 241 K/UL — SIGNIFICANT CHANGE UP (ref 150–400)
PMV BLD: 9.3 FL — SIGNIFICANT CHANGE UP (ref 7–13)
PO2 BLDV: 27 MMHG — LOW (ref 35–40)
POTASSIUM BLDV-SCNC: 5.3 MMOL/L — HIGH (ref 3.4–4.5)
POTASSIUM SERPL-MCNC: 5.1 MMOL/L — SIGNIFICANT CHANGE UP (ref 3.5–5.3)
POTASSIUM SERPL-SCNC: 5.1 MMOL/L — SIGNIFICANT CHANGE UP (ref 3.5–5.3)
PROT SERPL-MCNC: 7.8 G/DL — SIGNIFICANT CHANGE UP (ref 6–8.3)
RBC # BLD: 3.48 M/UL — LOW (ref 4.2–5.8)
RBC # FLD: 15.1 % — HIGH (ref 10.3–14.5)
SAO2 % BLDV: 39.1 % — LOW (ref 60–85)
SODIUM SERPL-SCNC: 136 MMOL/L — SIGNIFICANT CHANGE UP (ref 135–145)
TROPONIN T, HIGH SENSITIVITY: 43 NG/L — SIGNIFICANT CHANGE UP (ref ?–14)
TROPONIN T, HIGH SENSITIVITY: 50 NG/L — SIGNIFICANT CHANGE UP (ref ?–14)
WBC # BLD: 5.1 K/UL — SIGNIFICANT CHANGE UP (ref 3.8–10.5)
WBC # FLD AUTO: 5.1 K/UL — SIGNIFICANT CHANGE UP (ref 3.8–10.5)

## 2018-11-23 PROCEDURE — 70450 CT HEAD/BRAIN W/O DYE: CPT | Mod: 26

## 2018-11-23 PROCEDURE — 71046 X-RAY EXAM CHEST 2 VIEWS: CPT | Mod: 26

## 2018-11-23 RX ORDER — INSULIN LISPRO 100/ML
VIAL (ML) SUBCUTANEOUS AT BEDTIME
Qty: 0 | Refills: 0 | Status: DISCONTINUED | OUTPATIENT
Start: 2018-11-23 | End: 2018-11-26

## 2018-11-23 RX ORDER — INSULIN GLARGINE 100 [IU]/ML
6 INJECTION, SOLUTION SUBCUTANEOUS AT BEDTIME
Qty: 0 | Refills: 0 | Status: DISCONTINUED | OUTPATIENT
Start: 2018-11-23 | End: 2018-11-26

## 2018-11-23 RX ORDER — METOPROLOL TARTRATE 50 MG
50 TABLET ORAL DAILY
Qty: 0 | Refills: 0 | Status: DISCONTINUED | OUTPATIENT
Start: 2018-11-23 | End: 2018-11-26

## 2018-11-23 RX ORDER — LISINOPRIL 2.5 MG/1
10 TABLET ORAL DAILY
Qty: 0 | Refills: 0 | Status: DISCONTINUED | OUTPATIENT
Start: 2018-11-23 | End: 2018-11-26

## 2018-11-23 RX ORDER — FUROSEMIDE 40 MG
40 TABLET ORAL ONCE
Qty: 0 | Refills: 0 | Status: COMPLETED | OUTPATIENT
Start: 2018-11-23 | End: 2018-11-23

## 2018-11-23 RX ORDER — GLUCAGON INJECTION, SOLUTION 0.5 MG/.1ML
1 INJECTION, SOLUTION SUBCUTANEOUS ONCE
Qty: 0 | Refills: 0 | Status: DISCONTINUED | OUTPATIENT
Start: 2018-11-23 | End: 2018-11-26

## 2018-11-23 RX ORDER — AMLODIPINE BESYLATE 2.5 MG/1
5 TABLET ORAL DAILY
Qty: 0 | Refills: 0 | Status: DISCONTINUED | OUTPATIENT
Start: 2018-11-23 | End: 2018-11-26

## 2018-11-23 RX ORDER — DEXTROSE 50 % IN WATER 50 %
25 SYRINGE (ML) INTRAVENOUS ONCE
Qty: 0 | Refills: 0 | Status: DISCONTINUED | OUTPATIENT
Start: 2018-11-23 | End: 2018-11-26

## 2018-11-23 RX ORDER — FUROSEMIDE 40 MG
40 TABLET ORAL DAILY
Qty: 0 | Refills: 0 | Status: DISCONTINUED | OUTPATIENT
Start: 2018-11-24 | End: 2018-11-26

## 2018-11-23 RX ORDER — INSULIN LISPRO 100/ML
VIAL (ML) SUBCUTANEOUS
Qty: 0 | Refills: 0 | Status: DISCONTINUED | OUTPATIENT
Start: 2018-11-23 | End: 2018-11-26

## 2018-11-23 RX ORDER — DEXTROSE 50 % IN WATER 50 %
15 SYRINGE (ML) INTRAVENOUS ONCE
Qty: 0 | Refills: 0 | Status: DISCONTINUED | OUTPATIENT
Start: 2018-11-23 | End: 2018-11-26

## 2018-11-23 RX ORDER — AMLODIPINE BESYLATE 2.5 MG/1
1 TABLET ORAL
Qty: 0 | Refills: 0 | COMMUNITY

## 2018-11-23 RX ORDER — SODIUM CHLORIDE 9 MG/ML
3 INJECTION INTRAMUSCULAR; INTRAVENOUS; SUBCUTANEOUS EVERY 8 HOURS
Qty: 0 | Refills: 0 | Status: DISCONTINUED | OUTPATIENT
Start: 2018-11-23 | End: 2018-11-26

## 2018-11-23 RX ORDER — HEPARIN SODIUM 5000 [USP'U]/ML
5000 INJECTION INTRAVENOUS; SUBCUTANEOUS EVERY 12 HOURS
Qty: 0 | Refills: 0 | Status: DISCONTINUED | OUTPATIENT
Start: 2018-11-23 | End: 2018-11-26

## 2018-11-23 RX ORDER — DOCUSATE SODIUM 100 MG
100 CAPSULE ORAL DAILY
Qty: 0 | Refills: 0 | Status: DISCONTINUED | OUTPATIENT
Start: 2018-11-23 | End: 2018-11-26

## 2018-11-23 RX ORDER — ACETAMINOPHEN 500 MG
650 TABLET ORAL ONCE
Qty: 0 | Refills: 0 | Status: COMPLETED | OUTPATIENT
Start: 2018-11-23 | End: 2018-11-23

## 2018-11-23 RX ORDER — POLYETHYLENE GLYCOL 3350 17 G/17G
17 POWDER, FOR SOLUTION ORAL DAILY
Qty: 0 | Refills: 0 | Status: DISCONTINUED | OUTPATIENT
Start: 2018-11-23 | End: 2018-11-26

## 2018-11-23 RX ORDER — SODIUM CHLORIDE 9 MG/ML
1000 INJECTION, SOLUTION INTRAVENOUS
Qty: 0 | Refills: 0 | Status: DISCONTINUED | OUTPATIENT
Start: 2018-11-23 | End: 2018-11-26

## 2018-11-23 RX ORDER — FINASTERIDE 5 MG/1
5 TABLET, FILM COATED ORAL DAILY
Qty: 0 | Refills: 0 | Status: DISCONTINUED | OUTPATIENT
Start: 2018-11-23 | End: 2018-11-26

## 2018-11-23 RX ORDER — ASPIRIN/CALCIUM CARB/MAGNESIUM 324 MG
325 TABLET ORAL DAILY
Qty: 0 | Refills: 0 | Status: DISCONTINUED | OUTPATIENT
Start: 2018-11-23 | End: 2018-11-26

## 2018-11-23 RX ORDER — SENNA PLUS 8.6 MG/1
2 TABLET ORAL AT BEDTIME
Qty: 0 | Refills: 0 | Status: DISCONTINUED | OUTPATIENT
Start: 2018-11-23 | End: 2018-11-26

## 2018-11-23 RX ORDER — TAMSULOSIN HYDROCHLORIDE 0.4 MG/1
0.4 CAPSULE ORAL AT BEDTIME
Qty: 0 | Refills: 0 | Status: DISCONTINUED | OUTPATIENT
Start: 2018-11-23 | End: 2018-11-26

## 2018-11-23 RX ORDER — DEXTROSE 50 % IN WATER 50 %
12.5 SYRINGE (ML) INTRAVENOUS ONCE
Qty: 0 | Refills: 0 | Status: DISCONTINUED | OUTPATIENT
Start: 2018-11-23 | End: 2018-11-26

## 2018-11-23 RX ADMIN — SENNA PLUS 2 TABLET(S): 8.6 TABLET ORAL at 22:14

## 2018-11-23 RX ADMIN — TAMSULOSIN HYDROCHLORIDE 0.4 MILLIGRAM(S): 0.4 CAPSULE ORAL at 22:14

## 2018-11-23 RX ADMIN — INSULIN GLARGINE 6 UNIT(S): 100 INJECTION, SOLUTION SUBCUTANEOUS at 22:14

## 2018-11-23 RX ADMIN — SODIUM CHLORIDE 3 MILLILITER(S): 9 INJECTION INTRAMUSCULAR; INTRAVENOUS; SUBCUTANEOUS at 22:11

## 2018-11-23 RX ADMIN — Medication 650 MILLIGRAM(S): at 17:41

## 2018-11-23 RX ADMIN — Medication 40 MILLIGRAM(S): at 18:24

## 2018-11-23 NOTE — ED PROVIDER NOTE - MEDICAL DECISION MAKING DETAILS
83yo male with DM, HTN, presenting with exertional dyspnea and palpitations, hypoxia, likely has congestive heart failure exacerbation vs acute coronary syndrome, will check EKG, CXR, labs, reassess. Kelly Noble DO

## 2018-11-23 NOTE — ED ADULT NURSE NOTE - NSIMPLEMENTINTERV_GEN_ALL_ED
Implemented All Fall with Harm Risk Interventions:  [place] to call system. Call bell, personal items and telephone within reach. Instruct patient to call for assistance. Room bathroom lighting operational. Non-slip footwear when patient is off stretcher. Physically safe environment: no spills, clutter or unnecessary equipment. Stretcher in lowest position, wheels locked, appropriate side rails in place. Provide visual cue, wrist band, yellow gown, etc. Monitor gait and stability. Monitor for mental status changes and reorient to person, place, and time. Review medications for side effects contributing to fall risk. Reinforce activity limits and safety measures with patient and family. Provide visual clues: red socks.

## 2018-11-23 NOTE — H&P ADULT - PROBLEM SELECTOR PLAN 1
CM  F/U TTE, PT, Dietary consults, I's, O's daily weights and lytes.  Give O2, Lasix 40 mg IV daily.  Continue ACE.  Low salt diet. Fluid restrict 1L/ day.

## 2018-11-23 NOTE — ED PROVIDER NOTE - ATTENDING CONTRIBUTION TO CARE
84M  p/w feeling woozy this morning.  Was getting up and walking around – walking with the walker.  Also c/o difficulty breathing since 8AM.  Denies pain.  Pt reports felt okay yesterday.   Also c/o chest discomfort today.  Also c/o HA.  Norvasc – Dr Nicole decreased norvasc.  Oxygen noted to be low here.  Likely new onset CHF – (+)JVD and bilat crackles, mild abd distension and 1+ LE edema.  Rx tylenol for HA, Head CT given new onset HA (although neuro intact); CXR/EKG, CE and proBNP.  If time permits will do bedside echo.  TBA/Fuschetto.  EKG – SR at 71 no andrea.  Peaked [place] anteriorly.  No STD.  VS:  unremarkable except hypoxia correctible with oxygen    GEN - NAD ;malaise; A+O x3   HEAD - NC/AT     ENT - PEERL, EOMI, mucous membranes  moist , no discharge      NECK: Neck supple, non-tender without lymphadenopathy, no masses, (+) JVD  PULM - bilat crackles  symmetric breath sounds  COR -  normal heart sounds    ABD - , mild distension, no ttp, soft,  BACK - no CVA tenderness, nontender spine     EXTREMS - (+)1 edema, no deformity, warm and well perfused    SKIN - no rash or bruising      NEUROLOGIC - alert, CN 2-12 intact, sensation nl, motor no focal deficit.

## 2018-11-23 NOTE — H&P ADULT - RS GEN PE MLT RESP DETAILS PC
no rhonchi/no wheezes/good air movement/clear to auscultation bilaterally/no subcutaneous emphysema/breath sounds equal/airway patent/respirations non-labored

## 2018-11-23 NOTE — H&P ADULT - NEGATIVE ENMT SYMPTOMS
no vertigo/no nasal obstruction/no tinnitus/no nasal discharge/no sinus symptoms/no ear pain/no nasal congestion

## 2018-11-23 NOTE — H&P ADULT - NEGATIVE NEUROLOGICAL SYMPTOMS
no weakness/no generalized seizures/no tremors/no vertigo/no facial palsy/no focal seizures/no loss of consciousness/no hemiparesis/no paresthesias/no syncope/no difficulty walking/no confusion

## 2018-11-23 NOTE — H&P ADULT - GASTROINTESTINAL DETAILS
no distention/soft/no guarding/no masses palpable/bowel sounds normal/no bruit/nontender/no rebound tenderness/no rigidity

## 2018-11-23 NOTE — H&P ADULT - NSHPLABSRESULTS_GEN_ALL_CORE
CXR Small bilateral pleural effusions right greater than left. Diffusely prominent and indistinct bilateral lung markings with bilateral perihilar predominance compatible with moderate to severe congestion and edema however component of superimposed multifocal infection cannot be excluded in the proper clinical context.    CT HEAD Preliminary = Microvascular disease. No acute intracranial hemorrhage. No evidence for marginated acute transcortical territorial infarction.    H &H = 9.1/ 29  BUN/ Creatinine = 21/ 1.53  Glucose = 164  HsT= 50  BNP= 3735  Lasix 40 Mg IV   EKG SR @ 71b/ min, 1st * AVB, QT/ QTC= 394/ 418

## 2018-11-23 NOTE — H&P ADULT - HISTORY OF PRESENT ILLNESS
84M, ambulates with a walker, H OH, history of DM2, HTN, CAD s/p 3VCABG, HTN, BPH, experiencing shortness of breath on exertion, chest tightness and palpitations, feeling "woozy." Started this morning when getting up to go to the bathroom. Patient also endorsing mild HA. No falls. +Mild cough that is non-productive, no fevers or chills. Found to be hypoxic to 91% on room air. In the pt had a CXR 84M, ambulates with a walker, H OH, history of DM2, HTN, CAD s/p 3VCABG, HTN, BPH, experiencing shortness of breath on exertion, chest tightness and palpitations, feeling "woozy." Started this morning when getting up to go to the bathroom. Patient also endorsing HA, dry cough. No falls, fevers or chills. Found to be hypoxic to 91% on room air. In the pt had a CXR revealing Small bilateral pleural effusions right greater than left. Diffusely prominent and indistinct bilateral lung markings with bilateral perihilar predominance compatible with moderate to severe congestion and edema however component of superimposed multifocal infection cannot be excluded in the proper clinical context. ProBNp =3735, HsT= 50--> 43. The pt was given Lasix 40 mg IV X 1 with positive urine output. Currently the pt chest pain , palpitation, SOB free at this time.

## 2018-11-23 NOTE — ED PROVIDER NOTE - PHYSICAL EXAMINATION
Gen: NAD  Head: NCAT  HEENT: oral mucosa moist, normal conjunctiva, oropharynx clear without exudate or erythema  Lung: Bibasilar rales, no respiratory distress, no wheezing or rhonchi  CV: normal s1/s2, rrr, no murmurs, Normal perfusion, pulses 2+ throughout  Abd: soft, NTND  MSK: 1+ pitting edema bilateral lower extremities, no visible deformities, full range of motion in all 4 extremities  Neuro: No focal neurologic deficits  Skin: No rash   Psych: normal affect

## 2018-11-23 NOTE — H&P ADULT - ATTENDING COMMENTS
Patient seen and examined, agree with the above assessment and plan by the PA.  Pt well known to our practice  84M, ambulates with a walker, H OH, history of DM2, HTN, CAD s/p 3VCABG, HTN, BPH, experiencing shortness of breath on exertion consistent w CHF excaerbation  Pt clinically improved after IV lasix  cont lasix as ordered  echo  no events on tele

## 2018-11-23 NOTE — H&P ADULT - PMH
3-vessel coronary artery disease    Benign prostatic hyperplasia    DM (diabetes mellitus)    HTN (hypertension)

## 2018-11-23 NOTE — ED ADULT NURSE NOTE - OBJECTIVE STATEMENT
Pt arrived to room 2, A&Ox3. Pt c/o SOB on exertion that started this morning, with chest palpitations. Pt also states slight HA. Denies any fever or chills. Pt has slight, non-productive cough. Currently O2 sat 95% on 2L nasal cannula. No retractions or distress currently noted. Wife at bedside. Pt hard of hearing. Slight adventitious breath sounds heard on auscultation.  Bilateral LE edema noted. Labs drawn and sent. Will continue to monitor.

## 2018-11-23 NOTE — ED PROVIDER NOTE - OBJECTIVE STATEMENT
83yo male PMH Coronary Artery Disease, DM, HTN, presenting with shortness of breath on exertion, chest tightness and palpitations, feeling "woozy." Started this morning when getting up to go to the bathroom. Patient also endorsing mild HA. No falls. +Mild cough that is non-productive, no fevers or chills. Found to be hypoxic to 91% on room air.     Cardiology: Dr. Villarreal

## 2018-11-24 LAB
BUN SERPL-MCNC: 23 MG/DL — SIGNIFICANT CHANGE UP (ref 7–23)
CALCIUM SERPL-MCNC: 8.3 MG/DL — LOW (ref 8.4–10.5)
CHLORIDE SERPL-SCNC: 99 MMOL/L — SIGNIFICANT CHANGE UP (ref 98–107)
CHOLEST SERPL-MCNC: 143 MG/DL — SIGNIFICANT CHANGE UP (ref 120–199)
CO2 SERPL-SCNC: 24 MMOL/L — SIGNIFICANT CHANGE UP (ref 22–31)
CREAT SERPL-MCNC: 1.65 MG/DL — HIGH (ref 0.5–1.3)
GLUCOSE SERPL-MCNC: 159 MG/DL — HIGH (ref 70–99)
HBA1C BLD-MCNC: 7.6 % — HIGH (ref 4–5.6)
HCT VFR BLD CALC: 27.8 % — LOW (ref 39–50)
HDLC SERPL-MCNC: 56 MG/DL — HIGH (ref 35–55)
HGB BLD-MCNC: 8.9 G/DL — LOW (ref 13–17)
LIPID PNL WITH DIRECT LDL SERPL: 82 MG/DL — SIGNIFICANT CHANGE UP
MAGNESIUM SERPL-MCNC: 1.9 MG/DL — SIGNIFICANT CHANGE UP (ref 1.6–2.6)
MCHC RBC-ENTMCNC: 26.9 PG — LOW (ref 27–34)
MCHC RBC-ENTMCNC: 32 % — SIGNIFICANT CHANGE UP (ref 32–36)
MCV RBC AUTO: 84 FL — SIGNIFICANT CHANGE UP (ref 80–100)
NRBC # FLD: 0 — SIGNIFICANT CHANGE UP
PHOSPHATE SERPL-MCNC: 3.8 MG/DL — SIGNIFICANT CHANGE UP (ref 2.5–4.5)
PLATELET # BLD AUTO: 239 K/UL — SIGNIFICANT CHANGE UP (ref 150–400)
PMV BLD: 9.7 FL — SIGNIFICANT CHANGE UP (ref 7–13)
POTASSIUM SERPL-MCNC: 4.3 MMOL/L — SIGNIFICANT CHANGE UP (ref 3.5–5.3)
POTASSIUM SERPL-SCNC: 4.3 MMOL/L — SIGNIFICANT CHANGE UP (ref 3.5–5.3)
RBC # BLD: 3.31 M/UL — LOW (ref 4.2–5.8)
RBC # FLD: 15.2 % — HIGH (ref 10.3–14.5)
SODIUM SERPL-SCNC: 135 MMOL/L — SIGNIFICANT CHANGE UP (ref 135–145)
TRIGL SERPL-MCNC: 58 MG/DL — SIGNIFICANT CHANGE UP (ref 10–149)
TSH SERPL-MCNC: 7.35 UIU/ML — HIGH (ref 0.27–4.2)
WBC # BLD: 5.08 K/UL — SIGNIFICANT CHANGE UP (ref 3.8–10.5)
WBC # FLD AUTO: 5.08 K/UL — SIGNIFICANT CHANGE UP (ref 3.8–10.5)

## 2018-11-24 RX ADMIN — HEPARIN SODIUM 5000 UNIT(S): 5000 INJECTION INTRAVENOUS; SUBCUTANEOUS at 18:33

## 2018-11-24 RX ADMIN — POLYETHYLENE GLYCOL 3350 17 GRAM(S): 17 POWDER, FOR SOLUTION ORAL at 09:57

## 2018-11-24 RX ADMIN — LISINOPRIL 10 MILLIGRAM(S): 2.5 TABLET ORAL at 04:58

## 2018-11-24 RX ADMIN — Medication 50 MILLIGRAM(S): at 04:58

## 2018-11-24 RX ADMIN — AMLODIPINE BESYLATE 5 MILLIGRAM(S): 2.5 TABLET ORAL at 04:58

## 2018-11-24 RX ADMIN — Medication 4: at 13:43

## 2018-11-24 RX ADMIN — SENNA PLUS 2 TABLET(S): 8.6 TABLET ORAL at 21:28

## 2018-11-24 RX ADMIN — INSULIN GLARGINE 6 UNIT(S): 100 INJECTION, SOLUTION SUBCUTANEOUS at 21:28

## 2018-11-24 RX ADMIN — Medication 325 MILLIGRAM(S): at 09:48

## 2018-11-24 RX ADMIN — TAMSULOSIN HYDROCHLORIDE 0.4 MILLIGRAM(S): 0.4 CAPSULE ORAL at 21:28

## 2018-11-24 RX ADMIN — SODIUM CHLORIDE 3 MILLILITER(S): 9 INJECTION INTRAMUSCULAR; INTRAVENOUS; SUBCUTANEOUS at 13:45

## 2018-11-24 RX ADMIN — Medication 100 MILLIGRAM(S): at 09:48

## 2018-11-24 RX ADMIN — FINASTERIDE 5 MILLIGRAM(S): 5 TABLET, FILM COATED ORAL at 09:48

## 2018-11-24 RX ADMIN — Medication 40 MILLIGRAM(S): at 04:57

## 2018-11-24 RX ADMIN — HEPARIN SODIUM 5000 UNIT(S): 5000 INJECTION INTRAVENOUS; SUBCUTANEOUS at 04:57

## 2018-11-24 RX ADMIN — SODIUM CHLORIDE 3 MILLILITER(S): 9 INJECTION INTRAMUSCULAR; INTRAVENOUS; SUBCUTANEOUS at 21:26

## 2018-11-24 RX ADMIN — SODIUM CHLORIDE 3 MILLILITER(S): 9 INJECTION INTRAMUSCULAR; INTRAVENOUS; SUBCUTANEOUS at 05:48

## 2018-11-24 NOTE — DIETITIAN INITIAL EVALUATION ADULT. - OTHER INFO
Nutrition Consult X CHF exacerbation, Hx of DM2. Pt 83 yo male with Heart Failure. Pt asleep @ time of visit. Spouse @ bed side. Spouse answered questions during interview. Per spouse P's appetite not well for ~1 week. But prior to that Pt was eating fine. No report of chewing/swallowing difficulty; no report of nausea/vomiting/diarrhea @ present. Per spouse Pt's height: ~64"; Pt's weight: ~120#. Spouse also stated Pt gained weight: ~5# recently (when Pt was in rehab). Pt's diet order includes Consistent Carbohydrate, Low Sodium with fluid restriction. Prescribed diet discussed with spouse; written materials on diet also provided. RDN remains available, spouse made aware.

## 2018-11-24 NOTE — CONSULT NOTE ADULT - SUBJECTIVE AND OBJECTIVE BOX
Patient is a 84y old  Male who presents with a chief complaint of COOK x1 day (23 Nov 2018 19:41)      HPI:  84M, ambulates with a walker, H OH, history of DM2, HTN, CAD s/p 3VCABG, HTN, BPH, experiencing shortness of breath on exertion, chest tightness and palpitations, feeling "woozy." Started this morning when getting up to go to the bathroom. Patient also endorsing HA, dry cough. No falls, fevers or chills. Found to be hypoxic to 91% on room air. In the pt had a CXR revealing Small bilateral pleural effusions right greater than left. Diffusely prominent and indistinct bilateral lung markings with bilateral perihilar predominance compatible with moderate to severe congestion and edema however component of superimposed multifocal infection cannot be excluded in the proper clinical context. ProBNp =3735, HsT= 50--> 43. The pt was given Lasix 40 mg IV X 1 with positive urine output. Currently the pt chest pain , palpitation, SOB free at this time. (23 Nov 2018 19:41)      PAST MEDICAL & SURGICAL HISTORY:  Benign prostatic hyperplasia  3-vessel coronary artery disease  HTN (hypertension)  DM (diabetes mellitus)  S/P cholecystectomy      Review of Systems:   CONSTITUTIONAL: No fever,  or fatigue  NECK: No pain or stiffness  RESPIRATORY: No cough, wheezing, chills or hemoptysis; No shortness of breath  CARDIOVASCULAR: No chest pain, palpitations, dizziness, or leg swelling  GASTROINTESTINAL: No abdominal or epigastric pain. No nausea, vomiting, or hematemesis; No diarrhea or constipation.   NEUROLOGICAL: No headaches,           Allergies    No Known Allergies    Intolerances        Social History:     FAMILY HISTORY:  No pertinent family history in first degree relatives      MEDICATIONS  (STANDING):  amLODIPine   Tablet 5 milliGRAM(s) Oral daily  aspirin enteric coated 325 milliGRAM(s) Oral daily  dextrose 5%. 1000 milliLiter(s) (50 mL/Hr) IV Continuous <Continuous>  dextrose 50% Injectable 12.5 Gram(s) IV Push once  dextrose 50% Injectable 25 Gram(s) IV Push once  dextrose 50% Injectable 25 Gram(s) IV Push once  docusate sodium 100 milliGRAM(s) Oral daily  finasteride 5 milliGRAM(s) Oral daily  furosemide   Injectable 40 milliGRAM(s) IV Push daily  heparin  Injectable 5000 Unit(s) SubCutaneous every 12 hours  insulin glargine Injectable (LANTUS) 6 Unit(s) SubCutaneous at bedtime  insulin lispro (HumaLOG) corrective regimen sliding scale   SubCutaneous three times a day before meals  insulin lispro (HumaLOG) corrective regimen sliding scale   SubCutaneous at bedtime  lisinopril 10 milliGRAM(s) Oral daily  metoprolol succinate ER 50 milliGRAM(s) Oral daily  polyethylene glycol 3350 17 Gram(s) Oral daily  senna 2 Tablet(s) Oral at bedtime  sodium chloride 0.9% lock flush 3 milliLiter(s) IV Push every 8 hours  tamsulosin 0.4 milliGRAM(s) Oral at bedtime    MEDICATIONS  (PRN):  dextrose 40% Gel 15 Gram(s) Oral once PRN Blood Glucose LESS THAN 70 milliGRAM(s)/deciliter  glucagon  Injectable 1 milliGRAM(s) IntraMuscular once PRN Glucose LESS THAN 70 milligrams/deciliter      CAPILLARY BLOOD GLUCOSE      POCT Blood Glucose.: 165 mg/dL (24 Nov 2018 21:19)  POCT Blood Glucose.: 150 mg/dL (24 Nov 2018 17:29)  POCT Blood Glucose.: 247 mg/dL (24 Nov 2018 12:52)  POCT Blood Glucose.: 132 mg/dL (24 Nov 2018 08:39)    I&O's Summary    23 Nov 2018 07:01  -  24 Nov 2018 07:00  --------------------------------------------------------  IN: 540 mL / OUT: 800 mL / NET: -260 mL    24 Nov 2018 07:01  -  25 Nov 2018 01:07  --------------------------------------------------------  IN: 240 mL / OUT: 1000 mL / NET: -760 mL        PHYSICAL EXAM:    HEAD:  Atraumatic, Normocephalic  NECK: Supple, No JVD  CHEST/LUNG: Clear to auscultation bilaterally; No wheezing.  HEART: Regular rate and rhythm; No murmurs, rubs, or gallops  ABDOMEN: Soft, Nontender, Nondistended; Bowel sounds present  EXTREMITIES:  2+ Peripheral Pulses, No clubbing, cyanosis, or edema  PSYCH: AAOx3  NEUROLOGY: non-focal      LABS:                        8.9    5.08  )-----------( 239      ( 24 Nov 2018 07:40 )             27.8     11-24    135  |  99  |  23  ----------------------------<  159<H>  4.3   |  24  |  1.65<H>    Ca    8.3<L>      24 Nov 2018 07:40  Phos  3.8     11-24  Mg     1.9     11-24    TPro  7.8  /  Alb  3.1<L>  /  TBili  0.2  /  DBili  x   /  AST  24  /  ALT  13  /  AlkPhos  103  11-23      CARDIAC MARKERS ( 23 Nov 2018 21:00 )  x     / x     / 64 u/L / x     / x            CAPILLARY BLOOD GLUCOSE      POCT Blood Glucose.: 165 mg/dL (24 Nov 2018 21:19)  POCT Blood Glucose.: 150 mg/dL (24 Nov 2018 17:29)  POCT Blood Glucose.: 247 mg/dL (24 Nov 2018 12:52)  POCT Blood Glucose.: 132 mg/dL (24 Nov 2018 08:39)                RADIOLOGY & ADDITIONAL TESTS:    Imaging Personally Reviewed:    Consultant(s) Notes Reviewed:      Care Discussed with Consultants/Other Providers:    Thanks for consult. Will follow.

## 2018-11-24 NOTE — PHYSICAL THERAPY INITIAL EVALUATION ADULT - WEIGHT-BEARING RESTRICTIONS: STAND/SIT, REHAB EVAL
PHONE [address] @ [place] AT [facility], THEY WILL ACCEPT PT UPON DISCHARGE, THEY JUST REQUIRE PASSAR    DIOR ATTEMPTED TO RETURN CALL TO [place] , LEFT VOICE MAIL MESSAGE, DIOR WILL CONTACT DIOR AT [address] TO INQUIRE IF A PASSAR  HAD BEEN COMPLETED UPON ADMISSION TO [address] weight-bearing as tolerated

## 2018-11-24 NOTE — PHYSICAL THERAPY INITIAL EVALUATION ADULT - PERTINENT HX OF CURRENT PROBLEM, REHAB EVAL
84M, ambulates with a walker, [place], history of DM2, HTN, CAD s/p 3VCABG, HTN, BPH, experiencing shortness of breath on exertion, chest tightness and palpitations, feeling "woozy."

## 2018-11-24 NOTE — DIETITIAN INITIAL EVALUATION ADULT. - NS AS NUTRI INTERV MEALS SNACK
Other (specify)/1. Suggest: PO diet rx: Regular, Consistent Carbohydrate (no snacks), Low Sodium; Fluid restriction per MD discretion;            2. Encourage & assist Pt with meals; Monitor PO diet tolerance; Honor food preferences;               3. Monitor labs, weights, hydration status;/Diets modified for specific foods and ingredients

## 2018-11-24 NOTE — CONSULT NOTE ADULT - ASSESSMENT
Patient is a 84y old  Male who presents with a chief complaint of COOK x1 day (23 Nov 2018 19:41).    · Assessment	  84M admitted for COOK likely secondary to new onset HF.      Problem/Plan - 1:  ·  Problem: CHF exacerbation.  Plan: CM  IV Lasix     Problem/Plan - 2:  ·  Problem: Diabetes mellitus, type 2.  Plan:   FS QID.  HISS.  Continue Lantus.  DM diet.      Problem/Plan - 3:  ·  Problem: Coronary artery disease.  Plan: Continue ASA, BB, Statin,      Problem/Plan - 4:  ·  Problem: Essential hypertension.  Plan: Continue BP meds.      Problem/Plan - 5:  ·  Problem: Benign prostatic hyperplasia.  Plan: Continue BPH and Proscar.

## 2018-11-25 LAB
BUN SERPL-MCNC: 20 MG/DL — SIGNIFICANT CHANGE UP (ref 7–23)
CALCIUM SERPL-MCNC: 8.2 MG/DL — LOW (ref 8.4–10.5)
CHLORIDE SERPL-SCNC: 101 MMOL/L — SIGNIFICANT CHANGE UP (ref 98–107)
CO2 SERPL-SCNC: 24 MMOL/L — SIGNIFICANT CHANGE UP (ref 22–31)
CREAT SERPL-MCNC: 1.53 MG/DL — HIGH (ref 0.5–1.3)
GLUCOSE SERPL-MCNC: 74 MG/DL — SIGNIFICANT CHANGE UP (ref 70–99)
HCT VFR BLD CALC: 27.5 % — LOW (ref 39–50)
HGB BLD-MCNC: 8.5 G/DL — LOW (ref 13–17)
MAGNESIUM SERPL-MCNC: 2 MG/DL — SIGNIFICANT CHANGE UP (ref 1.6–2.6)
MCHC RBC-ENTMCNC: 25.4 PG — LOW (ref 27–34)
MCHC RBC-ENTMCNC: 30.9 % — LOW (ref 32–36)
MCV RBC AUTO: 82.3 FL — SIGNIFICANT CHANGE UP (ref 80–100)
NRBC # FLD: 0 — SIGNIFICANT CHANGE UP
NT-PROBNP SERPL-SCNC: 3731 PG/ML — SIGNIFICANT CHANGE UP
PHOSPHATE SERPL-MCNC: 4.4 MG/DL — SIGNIFICANT CHANGE UP (ref 2.5–4.5)
PLATELET # BLD AUTO: 242 K/UL — SIGNIFICANT CHANGE UP (ref 150–400)
PMV BLD: 9.3 FL — SIGNIFICANT CHANGE UP (ref 7–13)
POTASSIUM SERPL-MCNC: 3.7 MMOL/L — SIGNIFICANT CHANGE UP (ref 3.5–5.3)
POTASSIUM SERPL-SCNC: 3.7 MMOL/L — SIGNIFICANT CHANGE UP (ref 3.5–5.3)
RBC # BLD: 3.34 M/UL — LOW (ref 4.2–5.8)
RBC # FLD: 15.1 % — HIGH (ref 10.3–14.5)
SODIUM SERPL-SCNC: 137 MMOL/L — SIGNIFICANT CHANGE UP (ref 135–145)
WBC # BLD: 5.02 K/UL — SIGNIFICANT CHANGE UP (ref 3.8–10.5)
WBC # FLD AUTO: 5.02 K/UL — SIGNIFICANT CHANGE UP (ref 3.8–10.5)

## 2018-11-25 PROCEDURE — 93306 TTE W/DOPPLER COMPLETE: CPT | Mod: 26

## 2018-11-25 RX ADMIN — FINASTERIDE 5 MILLIGRAM(S): 5 TABLET, FILM COATED ORAL at 08:44

## 2018-11-25 RX ADMIN — AMLODIPINE BESYLATE 5 MILLIGRAM(S): 2.5 TABLET ORAL at 05:39

## 2018-11-25 RX ADMIN — INSULIN GLARGINE 6 UNIT(S): 100 INJECTION, SOLUTION SUBCUTANEOUS at 22:10

## 2018-11-25 RX ADMIN — SENNA PLUS 2 TABLET(S): 8.6 TABLET ORAL at 21:07

## 2018-11-25 RX ADMIN — Medication 325 MILLIGRAM(S): at 08:44

## 2018-11-25 RX ADMIN — Medication 2: at 12:55

## 2018-11-25 RX ADMIN — POLYETHYLENE GLYCOL 3350 17 GRAM(S): 17 POWDER, FOR SOLUTION ORAL at 08:44

## 2018-11-25 RX ADMIN — Medication 100 MILLIGRAM(S): at 08:44

## 2018-11-25 RX ADMIN — SODIUM CHLORIDE 3 MILLILITER(S): 9 INJECTION INTRAMUSCULAR; INTRAVENOUS; SUBCUTANEOUS at 05:39

## 2018-11-25 RX ADMIN — LISINOPRIL 10 MILLIGRAM(S): 2.5 TABLET ORAL at 05:39

## 2018-11-25 RX ADMIN — SODIUM CHLORIDE 3 MILLILITER(S): 9 INJECTION INTRAMUSCULAR; INTRAVENOUS; SUBCUTANEOUS at 21:06

## 2018-11-25 RX ADMIN — HEPARIN SODIUM 5000 UNIT(S): 5000 INJECTION INTRAVENOUS; SUBCUTANEOUS at 17:32

## 2018-11-25 RX ADMIN — HEPARIN SODIUM 5000 UNIT(S): 5000 INJECTION INTRAVENOUS; SUBCUTANEOUS at 05:39

## 2018-11-25 RX ADMIN — Medication 50 MILLIGRAM(S): at 05:39

## 2018-11-25 RX ADMIN — TAMSULOSIN HYDROCHLORIDE 0.4 MILLIGRAM(S): 0.4 CAPSULE ORAL at 21:07

## 2018-11-25 RX ADMIN — Medication 40 MILLIGRAM(S): at 05:39

## 2018-11-25 RX ADMIN — SODIUM CHLORIDE 3 MILLILITER(S): 9 INJECTION INTRAMUSCULAR; INTRAVENOUS; SUBCUTANEOUS at 13:01

## 2018-11-25 RX ADMIN — Medication 4: at 08:44

## 2018-11-26 ENCOUNTER — TRANSCRIPTION ENCOUNTER (OUTPATIENT)
Age: 83
End: 2018-11-26

## 2018-11-26 VITALS — WEIGHT: 108.47 LBS

## 2018-11-26 LAB
BUN SERPL-MCNC: 23 MG/DL — SIGNIFICANT CHANGE UP (ref 7–23)
CALCIUM SERPL-MCNC: 8.1 MG/DL — LOW (ref 8.4–10.5)
CHLORIDE SERPL-SCNC: 102 MMOL/L — SIGNIFICANT CHANGE UP (ref 98–107)
CO2 SERPL-SCNC: 26 MMOL/L — SIGNIFICANT CHANGE UP (ref 22–31)
CREAT SERPL-MCNC: 1.73 MG/DL — HIGH (ref 0.5–1.3)
GLUCOSE SERPL-MCNC: 83 MG/DL — SIGNIFICANT CHANGE UP (ref 70–99)
HCT VFR BLD CALC: 24.9 % — LOW (ref 39–50)
HGB BLD-MCNC: 8 G/DL — LOW (ref 13–17)
MCHC RBC-ENTMCNC: 26.8 PG — LOW (ref 27–34)
MCHC RBC-ENTMCNC: 32.1 % — SIGNIFICANT CHANGE UP (ref 32–36)
MCV RBC AUTO: 83.3 FL — SIGNIFICANT CHANGE UP (ref 80–100)
NRBC # FLD: 0 — SIGNIFICANT CHANGE UP
PLATELET # BLD AUTO: 250 K/UL — SIGNIFICANT CHANGE UP (ref 150–400)
PMV BLD: 9.2 FL — SIGNIFICANT CHANGE UP (ref 7–13)
POTASSIUM SERPL-MCNC: 4.2 MMOL/L — SIGNIFICANT CHANGE UP (ref 3.5–5.3)
POTASSIUM SERPL-SCNC: 4.2 MMOL/L — SIGNIFICANT CHANGE UP (ref 3.5–5.3)
RBC # BLD: 2.99 M/UL — LOW (ref 4.2–5.8)
RBC # FLD: 15 % — HIGH (ref 10.3–14.5)
SODIUM SERPL-SCNC: 138 MMOL/L — SIGNIFICANT CHANGE UP (ref 135–145)
WBC # BLD: 4.77 K/UL — SIGNIFICANT CHANGE UP (ref 3.8–10.5)
WBC # FLD AUTO: 4.77 K/UL — SIGNIFICANT CHANGE UP (ref 3.8–10.5)

## 2018-11-26 RX ORDER — METOPROLOL TARTRATE 50 MG
1 TABLET ORAL
Qty: 0 | Refills: 0 | COMMUNITY

## 2018-11-26 RX ORDER — LISINOPRIL 2.5 MG/1
1 TABLET ORAL
Qty: 0 | Refills: 0 | DISCHARGE
Start: 2018-11-26

## 2018-11-26 RX ORDER — INFLUENZA VIRUS VACCINE 15; 15; 15; 15 UG/.5ML; UG/.5ML; UG/.5ML; UG/.5ML
0.5 SUSPENSION INTRAMUSCULAR ONCE
Qty: 0 | Refills: 0 | Status: COMPLETED | OUTPATIENT
Start: 2018-11-26 | End: 2018-11-26

## 2018-11-26 RX ORDER — LISINOPRIL 2.5 MG/1
1 TABLET ORAL
Qty: 0 | Refills: 0 | COMMUNITY

## 2018-11-26 RX ORDER — FINASTERIDE 5 MG/1
1 TABLET, FILM COATED ORAL
Qty: 0 | Refills: 0 | COMMUNITY
Start: 2018-11-26

## 2018-11-26 RX ORDER — ASPIRIN/CALCIUM CARB/MAGNESIUM 324 MG
1 TABLET ORAL
Qty: 0 | Refills: 0 | COMMUNITY

## 2018-11-26 RX ORDER — FINASTERIDE 5 MG/1
1 TABLET, FILM COATED ORAL
Qty: 0 | Refills: 0 | COMMUNITY

## 2018-11-26 RX ORDER — ASPIRIN/CALCIUM CARB/MAGNESIUM 324 MG
1 TABLET ORAL
Qty: 0 | Refills: 0 | DISCHARGE
Start: 2018-11-26

## 2018-11-26 RX ORDER — TAMSULOSIN HYDROCHLORIDE 0.4 MG/1
1 CAPSULE ORAL
Qty: 0 | Refills: 0 | COMMUNITY

## 2018-11-26 RX ORDER — AMLODIPINE BESYLATE 2.5 MG/1
1 TABLET ORAL
Qty: 0 | Refills: 0 | COMMUNITY

## 2018-11-26 RX ORDER — METOPROLOL TARTRATE 50 MG
1 TABLET ORAL
Qty: 0 | Refills: 0 | DISCHARGE
Start: 2018-11-26

## 2018-11-26 RX ORDER — FUROSEMIDE 40 MG
1 TABLET ORAL
Qty: 30 | Refills: 0
Start: 2018-11-26 | End: 2018-12-25

## 2018-11-26 RX ORDER — AMLODIPINE BESYLATE 2.5 MG/1
1 TABLET ORAL
Qty: 0 | Refills: 0 | DISCHARGE
Start: 2018-11-26

## 2018-11-26 RX ORDER — TAMSULOSIN HYDROCHLORIDE 0.4 MG/1
1 CAPSULE ORAL
Qty: 0 | Refills: 0 | COMMUNITY
Start: 2018-11-26

## 2018-11-26 RX ADMIN — SODIUM CHLORIDE 3 MILLILITER(S): 9 INJECTION INTRAMUSCULAR; INTRAVENOUS; SUBCUTANEOUS at 15:04

## 2018-11-26 RX ADMIN — FINASTERIDE 5 MILLIGRAM(S): 5 TABLET, FILM COATED ORAL at 13:08

## 2018-11-26 RX ADMIN — Medication 325 MILLIGRAM(S): at 13:08

## 2018-11-26 RX ADMIN — Medication 40 MILLIGRAM(S): at 05:54

## 2018-11-26 RX ADMIN — Medication 100 MILLIGRAM(S): at 13:08

## 2018-11-26 RX ADMIN — HEPARIN SODIUM 5000 UNIT(S): 5000 INJECTION INTRAVENOUS; SUBCUTANEOUS at 05:54

## 2018-11-26 RX ADMIN — INFLUENZA VIRUS VACCINE 0.5 MILLILITER(S): 15; 15; 15; 15 SUSPENSION INTRAMUSCULAR at 15:56

## 2018-11-26 RX ADMIN — LISINOPRIL 10 MILLIGRAM(S): 2.5 TABLET ORAL at 05:54

## 2018-11-26 RX ADMIN — Medication 6: at 13:08

## 2018-11-26 RX ADMIN — Medication 50 MILLIGRAM(S): at 05:54

## 2018-11-26 RX ADMIN — SODIUM CHLORIDE 3 MILLILITER(S): 9 INJECTION INTRAMUSCULAR; INTRAVENOUS; SUBCUTANEOUS at 05:53

## 2018-11-26 RX ADMIN — POLYETHYLENE GLYCOL 3350 17 GRAM(S): 17 POWDER, FOR SOLUTION ORAL at 13:08

## 2018-11-26 RX ADMIN — AMLODIPINE BESYLATE 5 MILLIGRAM(S): 2.5 TABLET ORAL at 05:54

## 2018-11-26 NOTE — DISCHARGE NOTE ADULT - MEDICATION SUMMARY - MEDICATIONS TO TAKE
I will START or STAY ON the medications listed below when I get home from the hospital:    finasteride 5 mg oral tablet  -- 1 tab(s) by mouth once a day  -- Indication: For BPH    aspirin 325 mg oral delayed release tablet  -- 1 tab(s) by mouth once a day  -- Indication: For Coronary artery disease    lisinopril 10 mg oral tablet  -- 1 tab(s) by mouth once a day  -- Indication: For HTN    tamsulosin 0.4 mg oral capsule  -- 1 cap(s) by mouth once a day (at bedtime)  -- Indication: For BPH    insulin glargine  -- 6 unit(s) subcutaneous once a day (at bedtime)  -- Indication: For DM    metoprolol succinate 50 mg oral tablet, extended release  -- 1 tab(s) by mouth once a day  -- Indication: For HTN    amLODIPine 5 mg oral tablet  -- 1 tab(s) by mouth once a day  -- Indication: For HTN    senna oral tablet  -- 2 tab(s) by mouth once a day (at bedtime)  -- Indication: For Constipation    docusate sodium 100 mg oral capsule  -- 1 cap(s) by mouth once a day  -- Indication: For Constipation    polyethylene glycol 3350 oral powder for reconstitution  -- 17 gram(s) by mouth once a day  -- Indication: For Constipation I will START or STAY ON the medications listed below when I get home from the hospital:    finasteride 5 mg oral tablet  -- 1 tab(s) by mouth once a day  -- Indication: For BPH    aspirin 325 mg oral delayed release tablet  -- 1 tab(s) by mouth once a day  -- Indication: For Coronary artery disease    lisinopril 10 mg oral tablet  -- 1 tab(s) by mouth once a day  -- Indication: For HTN    tamsulosin 0.4 mg oral capsule  -- 1 cap(s) by mouth once a day (at bedtime)  -- Indication: For BPH    insulin glargine  -- 6 unit(s) subcutaneous once a day (at bedtime)  -- Indication: For DM    metoprolol succinate 50 mg oral tablet, extended release  -- 1 tab(s) by mouth once a day  -- Indication: For HTN    amLODIPine 5 mg oral tablet  -- 1 tab(s) by mouth once a day  -- Indication: For HTN    Lasix 20 mg oral tablet  -- 1 tab(s) by mouth once a day   -- Avoid prolonged or excessive exposure to direct and/or artificial sunlight while taking this medication.  It is very important that you take or use this exactly as directed.  Do not skip doses or discontinue unless directed by your doctor.  It may be advisable to drink a full glass orange juice or eat a banana daily while taking this medication.    -- Indication: For CHF exacerbation    senna oral tablet  -- 2 tab(s) by mouth once a day (at bedtime)  -- Indication: For Constipation    docusate sodium 100 mg oral capsule  -- 1 cap(s) by mouth once a day  -- Indication: For Constipation    polyethylene glycol 3350 oral powder for reconstitution  -- 17 gram(s) by mouth once a day  -- Indication: For Constipation

## 2018-11-26 NOTE — DISCHARGE NOTE ADULT - CARE PROVIDER_API CALL
Jair Villarreal (MD), Cardiovascular Disease; Internal Medicine; Interventional Cardiology; Nuclear Cardiology  [address]  [place] [address]  Phone: [phone]  Fax: [phone]

## 2018-11-26 NOTE — DISCHARGE NOTE ADULT - CARE PLAN
Principal Discharge DX:	Acute on chronic diastolic (congestive) heart failure  Goal:	To relieve and prevent worsening symptoms associated with congestive heart failure, to improve quality of life, and to treat underlying conditions such as coronary heart disease, high blood pressure, or diabetes, and to maintain euvolemia.  Assessment and plan of treatment:	Low salt diet, fluid restriction to 1500 ml daily, monitor your fluid intake and weight daily, exercise as tolerated 30 minutes daily, and follow up with your physician within 1 to 2 weeks.  Secondary Diagnosis:	Diabetes mellitus, type 2  Goal:	To maintain a normal blood glucose level and HgA1C level < 5.7 and to prevent diabetic complications such as uncontrolled diabetes, diabetic coma, blindness, renal failure, and amputations.  Assessment and plan of treatment:	Monitor finger sticks pre-meal and bedtime, low salt, fat diet, minimize glucose intake.  Exercise daily for at least 30 minutes and weight loss.  Follow up with primary care physician and endocrinologist for routine Hemoglobin A1C checks and management.  Follow up with your ophthalmologist for routine yearly vision exams.  Secondary Diagnosis:	Essential hypertension  Goal:	To maintain a normal blood pressure to prevent heart attack, stroke and renal failure.  Assessment and plan of treatment:	Low sodium and fat diet, continue anti-hypertensive medications, and follow up with primary care physician.  Secondary Diagnosis:	Mitral valve insufficiency  Goal:	medical management  Assessment and plan of treatment:	Continue current meds

## 2018-11-26 NOTE — DISCHARGE NOTE ADULT - HOSPITAL COURSE
84 year old male with htn, CAD s/p CABG, post op Afib, DM, dementia presenting with COOK, acute/chronic diastolic chf.     Acute/chronic diastolic CHF  volume status improved   change IV lasix to 20mg po daily   continue bb, acei   echo with normal LV function, EF 66%, mod - severe MR (no changes from previous Echo done in office)     Mod-severe MR  no further w/u given hx of dementia   continue medical management     CAD s/p CABG  cv stable no chest pain or sob  continue 325 mg ASA, BB and statin     Afib, hx    no further reoccurrence. remains in SR   continue BB and ASA     HTN   bp stable, continue with current anti-htn meds     dvt ppx  pt. stable for discharge, f/u appointment scheduled for 12/4 1:30pm

## 2018-11-26 NOTE — PROGRESS NOTE ADULT - SUBJECTIVE AND OBJECTIVE BOX
CARDIOLOGY FOLLOW UP - Dr. Villarreal    CC no cp/sob       PHYSICAL EXAM:  T(C): 36.8 (11-26-18 @ 05:51), Max: 37 (11-25-18 @ 11:38)  HR: 70 (11-26-18 @ 05:51) (63 - 75)  BP: 121/63 (11-26-18 @ 05:51) (112/63 - 124/66)  RR: 18 (11-26-18 @ 05:51) (18 - 18)  SpO2: 98% (11-26-18 @ 05:51) (96% - 98%)  Wt(kg): --  I&O's Summary    25 Nov 2018 07:01  -  26 Nov 2018 07:00  --------------------------------------------------------  IN: 720 mL / OUT: 1500 mL / NET: -780 mL    26 Nov 2018 07:01  -  26 Nov 2018 09:55  --------------------------------------------------------  IN: 0 mL / OUT: 700 mL / NET: -700 mL        Appearance: Normal	  Cardiovascular: Normal S1 S2,RRR, No JVD, + sys murmur   Respiratory: Lungs clear to auscultation	  Gastrointestinal:  Soft, Non-tender, + BS	  Extremities: Normal range of motion, No clubbing, cyanosis or edema        MEDICATIONS  (STANDING):  amLODIPine   Tablet 5 milliGRAM(s) Oral daily  aspirin enteric coated 325 milliGRAM(s) Oral daily  dextrose 5%. 1000 milliLiter(s) (50 mL/Hr) IV Continuous <Continuous>  dextrose 50% Injectable 12.5 Gram(s) IV Push once  dextrose 50% Injectable 25 Gram(s) IV Push once  dextrose 50% Injectable 25 Gram(s) IV Push once  docusate sodium 100 milliGRAM(s) Oral daily  finasteride 5 milliGRAM(s) Oral daily  furosemide   Injectable 40 milliGRAM(s) IV Push daily  heparin  Injectable 5000 Unit(s) SubCutaneous every 12 hours  insulin glargine Injectable (LANTUS) 6 Unit(s) SubCutaneous at bedtime  insulin lispro (HumaLOG) corrective regimen sliding scale   SubCutaneous three times a day before meals  insulin lispro (HumaLOG) corrective regimen sliding scale   SubCutaneous at bedtime  lisinopril 10 milliGRAM(s) Oral daily  metoprolol succinate ER 50 milliGRAM(s) Oral daily  polyethylene glycol 3350 17 Gram(s) Oral daily  senna 2 Tablet(s) Oral at bedtime  sodium chloride 0.9% lock flush 3 milliLiter(s) IV Push every 8 hours  tamsulosin 0.4 milliGRAM(s) Oral at bedtime      TELEMETRY: 1st avb , pvc     ECG:  	  RADIOLOGY:   DIAGNOSTIC TESTING:  [ ] Echocardiogram: < from: Transthoracic Echocardiogram (11.25.18 @ 12:09) >  CONCLUSIONS:  1. Moderate-severe mitral regurgitation. This may be  underestimated.  2. Normal left ventricular internal dimensions and wall  thicknesses.  3. Normal left ventricular systolic function. No segmental  wall motion abnormalities.  4. Normal right ventricular size and function.  5. Normal tricuspid valve. Mild tricuspid regurgitation.  6. Estimated pulmonary artery systolic pressure equals 43  mm Hg, assuming right atrial pressure equals 10  mm Hg,  consistent with mild pulmonary hypertension.  **Consider TEEfor better assessment of mitral valve, if  clinically indicated.    < end of copied text >    [ ]  Catheterization:  [ ] Stress Test:    OTHER: 	    LABS:	 	                                8.0    4.77  )-----------( 250      ( 26 Nov 2018 06:12 )             24.9     11-26    138  |  102  |  23  ----------------------------<  83  4.2   |  26  |  1.73<H>    Ca    8.1<L>      26 Nov 2018 06:12  Phos  4.4     11-25  Mg     2.0     11-25
CC: no complaints    TELEMETRY: nsr    PHYSICAL EXAM:    T(C): 36.7 (11-25-18 @ 05:37), Max: 36.9 (11-24-18 @ 15:17)  HR: 76 (11-25-18 @ 05:37) (74 - 76)  BP: 141/68 (11-25-18 @ 05:37) (117/87 - 141/68)  RR: 18 (11-25-18 @ 05:37) (18 - 18)  SpO2: 97% (11-25-18 @ 05:37) (96% - 97%)  Wt(kg): --  I&O's Summary    24 Nov 2018 07:01  -  25 Nov 2018 07:00  --------------------------------------------------------  IN: 360 mL / OUT: 1200 mL / NET: -840 mL        Appearance: Normal	  Cardiovascular: Normal S1 S2,RRR, No JVD, No murmurs  Respiratory: Lungs clear to auscultation	  Gastrointestinal:  Soft, Non-tender, + BS	  Extremities: Normal range of motion, No clubbing, cyanosis or edema  Vascular: Peripheral pulses palpable 2+ bilaterally     LABS:	 	                          8.5    5.02  )-----------( 242      ( 25 Nov 2018 06:09 )             27.5     11-25    137  |  101  |  20  ----------------------------<  74  3.7   |  24  |  1.53<H>    Ca    8.2<L>      25 Nov 2018 06:09  Phos  4.4     11-25  Mg     2.0     11-25    TPro  7.8  /  Alb  3.1<L>  /  TBili  0.2  /  DBili  x   /  AST  24  /  ALT  13  /  AlkPhos  103  11-23          CARDIAC MARKERS:
Patient is a 84y old  Male who presents with a chief complaint of COOK x1 day (25 Nov 2018 11:16)      SUBJECTIVE / OVERNIGHT EVENTS:   Feels better.  Denies CP/SOB/Palpitation/HA.    MEDICATIONS  (STANDING):  amLODIPine   Tablet 5 milliGRAM(s) Oral daily  aspirin enteric coated 325 milliGRAM(s) Oral daily  dextrose 5%. 1000 milliLiter(s) (50 mL/Hr) IV Continuous <Continuous>  dextrose 50% Injectable 12.5 Gram(s) IV Push once  dextrose 50% Injectable 25 Gram(s) IV Push once  dextrose 50% Injectable 25 Gram(s) IV Push once  docusate sodium 100 milliGRAM(s) Oral daily  finasteride 5 milliGRAM(s) Oral daily  furosemide   Injectable 40 milliGRAM(s) IV Push daily  heparin  Injectable 5000 Unit(s) SubCutaneous every 12 hours  insulin glargine Injectable (LANTUS) 6 Unit(s) SubCutaneous at bedtime  insulin lispro (HumaLOG) corrective regimen sliding scale   SubCutaneous three times a day before meals  insulin lispro (HumaLOG) corrective regimen sliding scale   SubCutaneous at bedtime  lisinopril 10 milliGRAM(s) Oral daily  metoprolol succinate ER 50 milliGRAM(s) Oral daily  polyethylene glycol 3350 17 Gram(s) Oral daily  senna 2 Tablet(s) Oral at bedtime  sodium chloride 0.9% lock flush 3 milliLiter(s) IV Push every 8 hours  tamsulosin 0.4 milliGRAM(s) Oral at bedtime    MEDICATIONS  (PRN):  dextrose 40% Gel 15 Gram(s) Oral once PRN Blood Glucose LESS THAN 70 milliGRAM(s)/deciliter  glucagon  Injectable 1 milliGRAM(s) IntraMuscular once PRN Glucose LESS THAN 70 milligrams/deciliter        CAPILLARY BLOOD GLUCOSE      POCT Blood Glucose.: 202 mg/dL (25 Nov 2018 21:59)  POCT Blood Glucose.: 105 mg/dL (25 Nov 2018 17:15)  POCT Blood Glucose.: 174 mg/dL (25 Nov 2018 12:52)  POCT Blood Glucose.: 224 mg/dL (25 Nov 2018 08:38)    I&O's Summary    24 Nov 2018 07:01  -  25 Nov 2018 07:00  --------------------------------------------------------  IN: 360 mL / OUT: 1200 mL / NET: -840 mL    25 Nov 2018 07:01  -  25 Nov 2018 23:44  --------------------------------------------------------  IN: 480 mL / OUT: 1150 mL / NET: -670 mL        PHYSICAL EXAM:    NECK: Supple, No JVD  CHEST/LUNG: Clear to auscultation bilaterally; No wheezing.  HEART: Regular rate and rhythm; No murmurs, rubs, or gallops  ABDOMEN: Soft, Nontender, Nondistended; Bowel sounds present  EXTREMITIES:   No clubbing, cyanosis, or edema  NEUROLOGY: AAO X 3      LABS:                        8.5    5.02  )-----------( 242      ( 25 Nov 2018 06:09 )             27.5     11-25    137  |  101  |  20  ----------------------------<  74  3.7   |  24  |  1.53<H>    Ca    8.2<L>      25 Nov 2018 06:09  Phos  4.4     11-25  Mg     2.0     11-25              CAPILLARY BLOOD GLUCOSE      POCT Blood Glucose.: 202 mg/dL (25 Nov 2018 21:59)  POCT Blood Glucose.: 105 mg/dL (25 Nov 2018 17:15)  POCT Blood Glucose.: 174 mg/dL (25 Nov 2018 12:52)  POCT Blood Glucose.: 224 mg/dL (25 Nov 2018 08:38)                RADIOLOGY & ADDITIONAL TESTS:    Imaging Personally Reviewed:    Consultant(s) Notes Reviewed:      Care Discussed with Consultants/Other Providers:
Patient is a 84y old  Male who presents with a chief complaint of COOK x1 day (26 Nov 2018 15:13)      SUBJECTIVE / OVERNIGHT EVENTS:   Feels better.  Denies CP/SOB/Palpitation/HA.    MEDICATIONS  (STANDING):  amLODIPine   Tablet 5 milliGRAM(s) Oral daily  aspirin enteric coated 325 milliGRAM(s) Oral daily  dextrose 5%. 1000 milliLiter(s) (50 mL/Hr) IV Continuous <Continuous>  dextrose 50% Injectable 12.5 Gram(s) IV Push once  dextrose 50% Injectable 25 Gram(s) IV Push once  dextrose 50% Injectable 25 Gram(s) IV Push once  docusate sodium 100 milliGRAM(s) Oral daily  finasteride 5 milliGRAM(s) Oral daily  furosemide   Injectable 40 milliGRAM(s) IV Push daily  heparin  Injectable 5000 Unit(s) SubCutaneous every 12 hours  insulin glargine Injectable (LANTUS) 6 Unit(s) SubCutaneous at bedtime  insulin lispro (HumaLOG) corrective regimen sliding scale   SubCutaneous three times a day before meals  insulin lispro (HumaLOG) corrective regimen sliding scale   SubCutaneous at bedtime  lisinopril 10 milliGRAM(s) Oral daily  metoprolol succinate ER 50 milliGRAM(s) Oral daily  polyethylene glycol 3350 17 Gram(s) Oral daily  senna 2 Tablet(s) Oral at bedtime  sodium chloride 0.9% lock flush 3 milliLiter(s) IV Push every 8 hours  tamsulosin 0.4 milliGRAM(s) Oral at bedtime    MEDICATIONS  (PRN):  dextrose 40% Gel 15 Gram(s) Oral once PRN Blood Glucose LESS THAN 70 milliGRAM(s)/deciliter  glucagon  Injectable 1 milliGRAM(s) IntraMuscular once PRN Glucose LESS THAN 70 milligrams/deciliter        CAPILLARY BLOOD GLUCOSE      POCT Blood Glucose.: 279 mg/dL (26 Nov 2018 12:27)  POCT Blood Glucose.: 116 mg/dL (26 Nov 2018 08:33)    I&O's Summary    25 Nov 2018 07:01  -  26 Nov 2018 07:00  --------------------------------------------------------  IN: 720 mL / OUT: 1500 mL / NET: -780 mL    26 Nov 2018 07:01  -  26 Nov 2018 22:52  --------------------------------------------------------  IN: 0 mL / OUT: 1100 mL / NET: -1100 mL        PHYSICAL EXAM:  GENERAL: NAD, well-developed  HEAD:  Atraumatic, Normocephalic  NECK: Supple, No JVD  CHEST/LUNG: Clear to auscultation bilaterally; No wheezing.  HEART: Regular rate and rhythm; No murmurs, rubs, or gallops  ABDOMEN: Soft, Nontender, Nondistended; Bowel sounds present  EXTREMITIES:   No clubbing, cyanosis, or edema  NEUROLOGY: AAO X 3  SKIN: No rashes    LABS:                        8.0    4.77  )-----------( 250      ( 26 Nov 2018 06:12 )             24.9     11-26    138  |  102  |  23  ----------------------------<  83  4.2   |  26  |  1.73<H>    Ca    8.1<L>      26 Nov 2018 06:12  Phos  4.4     11-25  Mg     2.0     11-25              CAPILLARY BLOOD GLUCOSE      POCT Blood Glucose.: 279 mg/dL (26 Nov 2018 12:27)  POCT Blood Glucose.: 116 mg/dL (26 Nov 2018 08:33)                RADIOLOGY & ADDITIONAL TESTS:    Imaging Personally Reviewed:    Consultant(s) Notes Reviewed:      Care Discussed with Consultants/Other Providers:

## 2018-11-26 NOTE — PROGRESS NOTE ADULT - ASSESSMENT
1. CHF exacerbation  2. CAD s.p CABG  3. DM/HTN    -CV stable  -cont IV lasix  -cont current cv meds  -F/U TTE
Patient is a 84y old  Male who presents with a chief complaint of COOK x1 day (23 Nov 2018 19:41).         Problem/Plan - 1:  ·  Problem: Acute Diastolic CHF exacerbation.  Plan:   IV Lasix  ECHO reviewed.  Cardio f/up noted,     Problem/Plan - 2:  ·  Problem: Uncontrolled Diabetes mellitus, type 2.  Plan:   FSSS/Lantus     Problem/Plan - 3:  ·  Problem: Coronary artery disease.  Plan: Continue ASA, BB, Statin,
Patient is a 84y old  Male who presents with a chief complaint of COOK x1 day (23 Nov 2018 19:41).      Problem/Plan - 1:  ·  Problem: Acute Diastolic CHF exacerbation.  Plan:   Feels better  ECHO reviewed.  Cardio f/up noted,     Problem/Plan - 2:  ·  Problem: Uncontrolled Diabetes mellitus, type 2.  Plan:   FSSS/Lantus     Problem/Plan - 3:  ·  Problem: Coronary artery disease.  Plan: Continue ASA, BB, Statin,
84 year old male with htn, CAD s/p CABG, post op Afib, DM, dementia presenting with COOK, acute/chronic diastolic chf.     1. Acute/chronic diastolic CHF  volume status improved   change IV lasix to 20mg po daily   continue bb, acei   echo with normal LV function, EF 66%, mod - severe MR     2. Mod-severe MR  no further w/u given hx of dementia   continue medical management     3.  CAD s/p CABG  cv stable no chest pain or sob  continue 325 mg ASA, BB and statin     4. Afib, hx    no further reoccurrence. remains in SR   continue BB and ASA     5. HTN   bp stable, continue with current anti-htn meds     dvt ppx  pt. stable for discharge, f/u appointment scheduled for 12/4 1:30pm

## 2018-11-26 NOTE — DISCHARGE NOTE ADULT - PLAN OF CARE
To relieve and prevent worsening symptoms associated with congestive heart failure, to improve quality of life, and to treat underlying conditions such as coronary heart disease, high blood pressure, or diabetes, and to maintain euvolemia. Low salt diet, fluid restriction to 1500 ml daily, monitor your fluid intake and weight daily, exercise as tolerated 30 minutes daily, and follow up with your physician within 1 to 2 weeks. To maintain a normal blood glucose level and HgA1C level < 5.7 and to prevent diabetic complications such as uncontrolled diabetes, diabetic coma, blindness, renal failure, and amputations. Monitor finger sticks pre-meal and bedtime, low salt, fat diet, minimize glucose intake.  Exercise daily for at least 30 minutes and weight loss.  Follow up with primary care physician and endocrinologist for routine Hemoglobin A1C checks and management.  Follow up with your ophthalmologist for routine yearly vision exams. To maintain a normal blood pressure to prevent heart attack, stroke and renal failure. Low sodium and fat diet, continue anti-hypertensive medications, and follow up with primary care physician. medical management Continue current meds

## 2018-11-26 NOTE — DISCHARGE NOTE ADULT - OTHER SIGNIFICANT FINDINGS
CXR Small bilateral pleural effusions right greater than left. Diffusely prominent and indistinct bilateral lung markings with bilateral perihilar predominance compatible with moderate to severe congestion and edema however component of superimposed multifocal infection cannot be excluded in the proper clinical context.    CT HEAD = Microvascular disease. No acute intracranial hemorrhage. No evidence for marginated acute transcortical territorial infarction.    H &H = 9.1/ 29  BUN/ Creatinine = 21/ 1.53  Glucose = 164  HsT= 50-->43  BNP= 3735  EKG SR @ 71b/ min, 1st * AVB, QT/ QTC= 394/ 418    11/25 ECHO: EF-66%, 1. Moderate-severe mitral regurge. This may be underestimated. 2. Normal left ventricular internal dimensions and wall thicknesses. 3. Normal left ventricular systolic function. No segmental wall motion abnormalities. 4. Normal right ventricular size and function. 5. Normal tricuspid valve. Mild tricuspid regurgitation. 6. Estimated pulmonary artery systolic pressure equals 43 mm Hg, assuming right atrial pressure equals 10  mm Hg, consistent with mild pulmonary hypertension. **Consider TWYLA for better assessment of mitral valve, if clinically indicated.

## 2018-11-26 NOTE — DISCHARGE NOTE ADULT - PATIENT PORTAL LINK FT
You can access the [facility] Patient Portal, offered by [facility], by registering with the following website: [email or web address]

## 2019-05-05 ENCOUNTER — INPATIENT (INPATIENT)
Facility: HOSPITAL | Age: 84
LOS: 7 days | Discharge: NURSING, REHAB OR LONG-TERM CARE | End: 2019-05-13
Attending: INTERNAL MEDICINE | Admitting: INTERNAL MEDICINE
Payer: MEDICARE

## 2019-05-05 VITALS
RESPIRATION RATE: 16 BRPM | TEMPERATURE: 99 F | HEART RATE: 83 BPM | DIASTOLIC BLOOD PRESSURE: 60 MMHG | OXYGEN SATURATION: 98 % | SYSTOLIC BLOOD PRESSURE: 158 MMHG

## 2019-05-05 DIAGNOSIS — E78.5 HYPERLIPIDEMIA, UNSPECIFIED: ICD-10-CM

## 2019-05-05 DIAGNOSIS — I63.9 CEREBRAL INFARCTION, UNSPECIFIED: ICD-10-CM

## 2019-05-05 DIAGNOSIS — E11.65 TYPE 2 DIABETES MELLITUS WITH HYPERGLYCEMIA: ICD-10-CM

## 2019-05-05 DIAGNOSIS — I10 ESSENTIAL (PRIMARY) HYPERTENSION: ICD-10-CM

## 2019-05-05 DIAGNOSIS — N40.0 BENIGN PROSTATIC HYPERPLASIA WITHOUT LOWER URINARY TRACT SYMPTOMS: ICD-10-CM

## 2019-05-05 DIAGNOSIS — R53.1 WEAKNESS: ICD-10-CM

## 2019-05-05 DIAGNOSIS — Z29.9 ENCOUNTER FOR PROPHYLACTIC MEASURES, UNSPECIFIED: ICD-10-CM

## 2019-05-05 DIAGNOSIS — Z95.1 PRESENCE OF AORTOCORONARY BYPASS GRAFT: Chronic | ICD-10-CM

## 2019-05-05 DIAGNOSIS — Z90.49 ACQUIRED ABSENCE OF OTHER SPECIFIED PARTS OF DIGESTIVE TRACT: Chronic | ICD-10-CM

## 2019-05-05 DIAGNOSIS — I25.10 ATHEROSCLEROTIC HEART DISEASE OF NATIVE CORONARY ARTERY WITHOUT ANGINA PECTORIS: ICD-10-CM

## 2019-05-05 LAB
ALBUMIN SERPL ELPH-MCNC: 3.2 G/DL — LOW (ref 3.3–5)
ALP SERPL-CCNC: 81 U/L — SIGNIFICANT CHANGE UP (ref 40–120)
ALT FLD-CCNC: 10 U/L — SIGNIFICANT CHANGE UP (ref 4–41)
ANION GAP SERPL CALC-SCNC: 11 MMO/L — SIGNIFICANT CHANGE UP (ref 7–14)
APPEARANCE UR: CLEAR — SIGNIFICANT CHANGE UP
APTT BLD: 31.4 SEC — SIGNIFICANT CHANGE UP (ref 27.5–36.3)
AST SERPL-CCNC: 16 U/L — SIGNIFICANT CHANGE UP (ref 4–40)
BACTERIA # UR AUTO: SIGNIFICANT CHANGE UP
BASE EXCESS BLDV CALC-SCNC: 3 MMOL/L — SIGNIFICANT CHANGE UP
BASOPHILS # BLD AUTO: 0.03 K/UL — SIGNIFICANT CHANGE UP (ref 0–0.2)
BASOPHILS NFR BLD AUTO: 0.5 % — SIGNIFICANT CHANGE UP (ref 0–2)
BILIRUB SERPL-MCNC: < 0.2 MG/DL — LOW (ref 0.2–1.2)
BILIRUB UR-MCNC: NEGATIVE — SIGNIFICANT CHANGE UP
BLOOD GAS VENOUS - CREATININE: 1.54 MG/DL — HIGH (ref 0.5–1.3)
BLOOD UR QL VISUAL: NEGATIVE — SIGNIFICANT CHANGE UP
BUN SERPL-MCNC: 30 MG/DL — HIGH (ref 7–23)
CALCIUM SERPL-MCNC: 8.9 MG/DL — SIGNIFICANT CHANGE UP (ref 8.4–10.5)
CHLORIDE BLDV-SCNC: 106 MMOL/L — SIGNIFICANT CHANGE UP (ref 96–108)
CHLORIDE SERPL-SCNC: 99 MMOL/L — SIGNIFICANT CHANGE UP (ref 98–107)
CHOLEST SERPL-MCNC: 215 MG/DL — HIGH (ref 120–199)
CO2 SERPL-SCNC: 26 MMOL/L — SIGNIFICANT CHANGE UP (ref 22–31)
COLOR SPEC: YELLOW — SIGNIFICANT CHANGE UP
CREAT SERPL-MCNC: 1.78 MG/DL — HIGH (ref 0.5–1.3)
EOSINOPHIL # BLD AUTO: 0.18 K/UL — SIGNIFICANT CHANGE UP (ref 0–0.5)
EOSINOPHIL NFR BLD AUTO: 3.3 % — SIGNIFICANT CHANGE UP (ref 0–6)
EPI CELLS # UR: SIGNIFICANT CHANGE UP
GAS PNL BLDV: 136 MMOL/L — SIGNIFICANT CHANGE UP (ref 136–146)
GLUCOSE BLDV-MCNC: 211 MG/DL — HIGH (ref 70–99)
GLUCOSE SERPL-MCNC: 268 MG/DL — HIGH (ref 70–99)
GLUCOSE UR-MCNC: 500 — HIGH
HBA1C BLD-MCNC: 11.6 % — HIGH (ref 4–5.6)
HCO3 BLDV-SCNC: 26 MMOL/L — SIGNIFICANT CHANGE UP (ref 20–27)
HCT VFR BLD CALC: 32.3 % — LOW (ref 39–50)
HCT VFR BLDV CALC: 31.4 % — LOW (ref 39–51)
HDLC SERPL-MCNC: 61 MG/DL — HIGH (ref 35–55)
HGB BLD-MCNC: 10 G/DL — LOW (ref 13–17)
HGB BLDV-MCNC: 10.1 G/DL — LOW (ref 13–17)
IMM GRANULOCYTES NFR BLD AUTO: 0.4 % — SIGNIFICANT CHANGE UP (ref 0–1.5)
INR BLD: 0.99 — SIGNIFICANT CHANGE UP (ref 0.88–1.17)
KETONES UR-MCNC: NEGATIVE — SIGNIFICANT CHANGE UP
LACTATE BLDV-MCNC: 1.2 MMOL/L — SIGNIFICANT CHANGE UP (ref 0.5–2)
LEUKOCYTE ESTERASE UR-ACNC: NEGATIVE — SIGNIFICANT CHANGE UP
LIPID PNL WITH DIRECT LDL SERPL: 162 MG/DL — SIGNIFICANT CHANGE UP
LYMPHOCYTES # BLD AUTO: 1.57 K/UL — SIGNIFICANT CHANGE UP (ref 1–3.3)
LYMPHOCYTES # BLD AUTO: 28.8 % — SIGNIFICANT CHANGE UP (ref 13–44)
MCHC RBC-ENTMCNC: 26.1 PG — LOW (ref 27–34)
MCHC RBC-ENTMCNC: 31 % — LOW (ref 32–36)
MCV RBC AUTO: 84.3 FL — SIGNIFICANT CHANGE UP (ref 80–100)
MONOCYTES # BLD AUTO: 0.54 K/UL — SIGNIFICANT CHANGE UP (ref 0–0.9)
MONOCYTES NFR BLD AUTO: 9.9 % — SIGNIFICANT CHANGE UP (ref 2–14)
NEUTROPHILS # BLD AUTO: 3.12 K/UL — SIGNIFICANT CHANGE UP (ref 1.8–7.4)
NEUTROPHILS NFR BLD AUTO: 57.1 % — SIGNIFICANT CHANGE UP (ref 43–77)
NITRITE UR-MCNC: NEGATIVE — SIGNIFICANT CHANGE UP
NRBC # FLD: 0 K/UL — SIGNIFICANT CHANGE UP (ref 0–0)
PCO2 BLDV: 51 MMHG — SIGNIFICANT CHANGE UP (ref 41–51)
PH BLDV: 7.36 PH — SIGNIFICANT CHANGE UP (ref 7.32–7.43)
PH UR: 6.5 — SIGNIFICANT CHANGE UP (ref 5–8)
PLATELET # BLD AUTO: 156 K/UL — SIGNIFICANT CHANGE UP (ref 150–400)
PMV BLD: 10.3 FL — SIGNIFICANT CHANGE UP (ref 7–13)
PO2 BLDV: 52 MMHG — HIGH (ref 35–40)
POTASSIUM BLDV-SCNC: 4.2 MMOL/L — SIGNIFICANT CHANGE UP (ref 3.4–4.5)
POTASSIUM SERPL-MCNC: 4.6 MMOL/L — SIGNIFICANT CHANGE UP (ref 3.5–5.3)
POTASSIUM SERPL-SCNC: 4.6 MMOL/L — SIGNIFICANT CHANGE UP (ref 3.5–5.3)
PROT SERPL-MCNC: 6.5 G/DL — SIGNIFICANT CHANGE UP (ref 6–8.3)
PROT UR-MCNC: >300 — SIGNIFICANT CHANGE UP
PROTHROM AB SERPL-ACNC: 11 SEC — SIGNIFICANT CHANGE UP (ref 9.8–13.1)
RBC # BLD: 3.83 M/UL — LOW (ref 4.2–5.8)
RBC # FLD: 13.2 % — SIGNIFICANT CHANGE UP (ref 10.3–14.5)
RBC CASTS # UR COMP ASSIST: SIGNIFICANT CHANGE UP (ref 0–?)
SAO2 % BLDV: 84.2 % — SIGNIFICANT CHANGE UP (ref 60–85)
SODIUM SERPL-SCNC: 136 MMOL/L — SIGNIFICANT CHANGE UP (ref 135–145)
SP GR SPEC: > 1.04 — HIGH (ref 1–1.04)
TRIGL SERPL-MCNC: 64 MG/DL — SIGNIFICANT CHANGE UP (ref 10–149)
TROPONIN T, HIGH SENSITIVITY: 36 NG/L — SIGNIFICANT CHANGE UP (ref ?–14)
TROPONIN T, HIGH SENSITIVITY: 38 NG/L — SIGNIFICANT CHANGE UP (ref ?–14)
UROBILINOGEN FLD QL: NORMAL — SIGNIFICANT CHANGE UP
WBC # BLD: 5.46 K/UL — SIGNIFICANT CHANGE UP (ref 3.8–10.5)
WBC # FLD AUTO: 5.46 K/UL — SIGNIFICANT CHANGE UP (ref 3.8–10.5)
WBC UR QL: SIGNIFICANT CHANGE UP (ref 0–?)

## 2019-05-05 PROCEDURE — 99222 1ST HOSP IP/OBS MODERATE 55: CPT | Mod: GC

## 2019-05-05 PROCEDURE — 70450 CT HEAD/BRAIN W/O DYE: CPT | Mod: 26

## 2019-05-05 PROCEDURE — 71045 X-RAY EXAM CHEST 1 VIEW: CPT | Mod: 26

## 2019-05-05 RX ORDER — DEXTROSE 50 % IN WATER 50 %
25 SYRINGE (ML) INTRAVENOUS ONCE
Qty: 0 | Refills: 0 | Status: DISCONTINUED | OUTPATIENT
Start: 2019-05-05 | End: 2019-05-13

## 2019-05-05 RX ORDER — SODIUM CHLORIDE 9 MG/ML
1000 INJECTION, SOLUTION INTRAVENOUS
Qty: 0 | Refills: 0 | Status: DISCONTINUED | OUTPATIENT
Start: 2019-05-05 | End: 2019-05-13

## 2019-05-05 RX ORDER — INSULIN GLARGINE 100 [IU]/ML
10 INJECTION, SOLUTION SUBCUTANEOUS AT BEDTIME
Qty: 0 | Refills: 0 | Status: DISCONTINUED | OUTPATIENT
Start: 2019-05-05 | End: 2019-05-05

## 2019-05-05 RX ORDER — DEXTROSE 50 % IN WATER 50 %
15 SYRINGE (ML) INTRAVENOUS ONCE
Qty: 0 | Refills: 0 | Status: DISCONTINUED | OUTPATIENT
Start: 2019-05-05 | End: 2019-05-13

## 2019-05-05 RX ORDER — INSULIN GLARGINE 100 [IU]/ML
10 INJECTION, SOLUTION SUBCUTANEOUS AT BEDTIME
Qty: 0 | Refills: 0 | Status: DISCONTINUED | OUTPATIENT
Start: 2019-05-05 | End: 2019-05-06

## 2019-05-05 RX ORDER — SODIUM CHLORIDE 9 MG/ML
1000 INJECTION INTRAMUSCULAR; INTRAVENOUS; SUBCUTANEOUS ONCE
Qty: 0 | Refills: 0 | Status: COMPLETED | OUTPATIENT
Start: 2019-05-05 | End: 2019-05-05

## 2019-05-05 RX ORDER — HEPARIN SODIUM 5000 [USP'U]/ML
5000 INJECTION INTRAVENOUS; SUBCUTANEOUS EVERY 8 HOURS
Qty: 0 | Refills: 0 | Status: DISCONTINUED | OUTPATIENT
Start: 2019-05-05 | End: 2019-05-13

## 2019-05-05 RX ORDER — ASPIRIN/CALCIUM CARB/MAGNESIUM 324 MG
325 TABLET ORAL DAILY
Qty: 0 | Refills: 0 | Status: DISCONTINUED | OUTPATIENT
Start: 2019-05-05 | End: 2019-05-08

## 2019-05-05 RX ORDER — SENNA PLUS 8.6 MG/1
2 TABLET ORAL AT BEDTIME
Qty: 0 | Refills: 0 | Status: DISCONTINUED | OUTPATIENT
Start: 2019-05-05 | End: 2019-05-13

## 2019-05-05 RX ORDER — INSULIN LISPRO 100/ML
3 VIAL (ML) SUBCUTANEOUS
Qty: 0 | Refills: 0 | Status: DISCONTINUED | OUTPATIENT
Start: 2019-05-05 | End: 2019-05-07

## 2019-05-05 RX ORDER — INSULIN GLARGINE 100 [IU]/ML
12 INJECTION, SOLUTION SUBCUTANEOUS AT BEDTIME
Qty: 0 | Refills: 0 | Status: DISCONTINUED | OUTPATIENT
Start: 2019-05-05 | End: 2019-05-05

## 2019-05-05 RX ORDER — INSULIN LISPRO 100/ML
VIAL (ML) SUBCUTANEOUS AT BEDTIME
Qty: 0 | Refills: 0 | Status: DISCONTINUED | OUTPATIENT
Start: 2019-05-05 | End: 2019-05-13

## 2019-05-05 RX ORDER — DOCUSATE SODIUM 100 MG
100 CAPSULE ORAL DAILY
Qty: 0 | Refills: 0 | Status: DISCONTINUED | OUTPATIENT
Start: 2019-05-05 | End: 2019-05-13

## 2019-05-05 RX ORDER — DEXTROSE 50 % IN WATER 50 %
12.5 SYRINGE (ML) INTRAVENOUS ONCE
Qty: 0 | Refills: 0 | Status: DISCONTINUED | OUTPATIENT
Start: 2019-05-05 | End: 2019-05-13

## 2019-05-05 RX ORDER — GLUCAGON INJECTION, SOLUTION 0.5 MG/.1ML
1 INJECTION, SOLUTION SUBCUTANEOUS ONCE
Qty: 0 | Refills: 0 | Status: DISCONTINUED | OUTPATIENT
Start: 2019-05-05 | End: 2019-05-13

## 2019-05-05 RX ORDER — FINASTERIDE 5 MG/1
5 TABLET, FILM COATED ORAL DAILY
Qty: 0 | Refills: 0 | Status: DISCONTINUED | OUTPATIENT
Start: 2019-05-05 | End: 2019-05-13

## 2019-05-05 RX ORDER — ATORVASTATIN CALCIUM 80 MG/1
80 TABLET, FILM COATED ORAL AT BEDTIME
Qty: 0 | Refills: 0 | Status: DISCONTINUED | OUTPATIENT
Start: 2019-05-05 | End: 2019-05-13

## 2019-05-05 RX ORDER — TAMSULOSIN HYDROCHLORIDE 0.4 MG/1
0.4 CAPSULE ORAL AT BEDTIME
Qty: 0 | Refills: 0 | Status: DISCONTINUED | OUTPATIENT
Start: 2019-05-05 | End: 2019-05-13

## 2019-05-05 RX ORDER — INSULIN LISPRO 100/ML
VIAL (ML) SUBCUTANEOUS
Qty: 0 | Refills: 0 | Status: DISCONTINUED | OUTPATIENT
Start: 2019-05-05 | End: 2019-05-13

## 2019-05-05 RX ORDER — POLYETHYLENE GLYCOL 3350 17 G/17G
17 POWDER, FOR SOLUTION ORAL DAILY
Qty: 0 | Refills: 0 | Status: DISCONTINUED | OUTPATIENT
Start: 2019-05-05 | End: 2019-05-13

## 2019-05-05 RX ADMIN — Medication 3 UNIT(S): at 13:56

## 2019-05-05 RX ADMIN — HEPARIN SODIUM 5000 UNIT(S): 5000 INJECTION INTRAVENOUS; SUBCUTANEOUS at 15:20

## 2019-05-05 RX ADMIN — POLYETHYLENE GLYCOL 3350 17 GRAM(S): 17 POWDER, FOR SOLUTION ORAL at 18:49

## 2019-05-05 RX ADMIN — Medication 2: at 13:09

## 2019-05-05 RX ADMIN — SODIUM CHLORIDE 1000 MILLILITER(S): 9 INJECTION INTRAMUSCULAR; INTRAVENOUS; SUBCUTANEOUS at 05:28

## 2019-05-05 RX ADMIN — TAMSULOSIN HYDROCHLORIDE 0.4 MILLIGRAM(S): 0.4 CAPSULE ORAL at 23:33

## 2019-05-05 RX ADMIN — SENNA PLUS 2 TABLET(S): 8.6 TABLET ORAL at 23:33

## 2019-05-05 RX ADMIN — INSULIN GLARGINE 10 UNIT(S): 100 INJECTION, SOLUTION SUBCUTANEOUS at 23:32

## 2019-05-05 RX ADMIN — Medication 100 MILLIGRAM(S): at 13:45

## 2019-05-05 RX ADMIN — FINASTERIDE 5 MILLIGRAM(S): 5 TABLET, FILM COATED ORAL at 13:45

## 2019-05-05 RX ADMIN — ATORVASTATIN CALCIUM 80 MILLIGRAM(S): 80 TABLET, FILM COATED ORAL at 23:33

## 2019-05-05 RX ADMIN — Medication 325 MILLIGRAM(S): at 13:45

## 2019-05-05 RX ADMIN — SODIUM CHLORIDE 1000 MILLILITER(S): 9 INJECTION INTRAMUSCULAR; INTRAVENOUS; SUBCUTANEOUS at 07:31

## 2019-05-05 RX ADMIN — HEPARIN SODIUM 5000 UNIT(S): 5000 INJECTION INTRAVENOUS; SUBCUTANEOUS at 23:36

## 2019-05-05 NOTE — ED PROVIDER NOTE - NS ED ROS FT
ROS: denies HA, dizziness, fevers/chills, nausea/vomiting, SOB, diaphoresis, abdominal pain, diarrhea, joint pain, neuro deficits, dysuria/hematuria, rash    +weakness, CP

## 2019-05-05 NOTE — ED PROVIDER NOTE - ATTENDING CONTRIBUTION TO CARE
MD Abarca:  I performed a face to face bedside interview with patient regarding history of present illness, review of symptoms and past medical history. I completed an independent physical exam(documented below).  I have discussed patient's plan of care with resident.   I agree with note as stated above, having amended the EMR as needed to reflect my findings. I have discussed the assessment and plan of care.  This includes during the time I functioned as the attending physician for this patient.  PE:  Gen: Alert, NAD  Head: NC, AT,  EOMI, normal lids/conjunctiva  ENT:  normal hearing, patent oropharynx without erythema/exudate  Neck: +supple, no tenderness/meningismus/JVD, +Trachea midline  Chest: no chest wall tenderness, equal chest rise  Pulm: Bilateral BS, normal resp effort, no wheeze/stridor/retractions  CV: RRR, no M/R/G, +dist pulses  Abd: +BS, soft, NT/ND  Rectal: deferred  Mskel: no edema/erythema/cyanosis  Skin: no rash  Neuro: AAOx3  MDM:  83yo M w/ pmh of htn/DM, CAD c/o general weakness and L sided chest discomfort. FS in 400s for EMS. Denies f/c/n/v. Labs to r/o DKA. ECG, labs, ua, admission for acs w/u.

## 2019-05-05 NOTE — ED ADULT NURSE NOTE - CHIEF COMPLAINT QUOTE
Pt arrives to ED c/o weakness since 5pm last night.  Pt fs on scene was 358.  Pt taking insulin as prescribed as per spouse.  FS in triage = 277.  EKG in triage.

## 2019-05-05 NOTE — H&P ADULT - HISTORY OF PRESENT ILLNESS
85 yo male PMhx DM2, HTN, HLD, CAD, s/p CABG x3 p/w generalized weakness and slurred speech yesterday at 10pm. Pt poor historian, wife at bedside gave most of history. Pt's last seen normal yesterday morning @ 8am where pt is fully functional, ambulates freely without any aids. Pt had dinner at around 8pm. At 10 pm pt developed sudden generalized weakness, bilateral blurred vision and slurred speech. Pt checked his BFS 65. EMS was called, BFS by EMS was 293. Pt didn't eat anything after 1st BFS. Pt admits SOB at that time and polyuria. Pt denies chest pain, palpitations, facial palsy, headache, nausea, vomiting or abdominal pain.    In ED pt was given IV NS x1L.    On admission, pt found to have L facial droop and L pronator drift.

## 2019-05-05 NOTE — PROGRESS NOTE ADULT - SUBJECTIVE AND OBJECTIVE BOX
CC: pt seen and examined, known to practice, pls see full h and p to follow. 84 year old male with htn, CAD s/p CABG, post op Afib, DM, dementia, chronic d-CHF p/w.     TELEMETRY:     PHYSICAL EXAM:    T(C): 36.9 (05-05-19 @ 05:11), Max: 37.2 (05-05-19 @ 03:10)  HR: 77 (05-05-19 @ 08:31) (77 - 83)  BP: 152/75 (05-05-19 @ 08:31) (152/75 - 160/60)  RR: 18 (05-05-19 @ 08:31) (16 - 18)  SpO2: 99% (05-05-19 @ 08:31) (98% - 99%)  Wt(kg): --  I&O's Summary      Appearance: Normal	  Cardiovascular: Normal S1 S2,RRR, No JVD, No murmurs  Respiratory: Lungs clear to auscultation	  Gastrointestinal:  Soft, Non-tender, + BS	  Extremities: Normal range of motion, No clubbing, cyanosis or edema  Vascular: Peripheral pulses palpable 2+ bilaterally     LABS:	 	                          10.0   5.46  )-----------( 156      ( 05 May 2019 05:15 )             32.3     05-05    136  |  99  |  30<H>  ----------------------------<  268<H>  4.6   |  26  |  1.78<H>    Ca    8.9      05 May 2019 05:15    TPro  6.5  /  Alb  3.2<L>  /  TBili  < 0.2<L>  /  DBili  x   /  AST  16  /  ALT  10  /  AlkPhos  81  05-05          CARDIAC MARKERS:

## 2019-05-05 NOTE — H&P ADULT - RS GEN PE MLT RESP DETAILS PC
normal/respirations non-labored/airway patent/breath sounds equal/good air movement/clear to auscultation bilaterally/no chest wall tenderness

## 2019-05-05 NOTE — ED PROVIDER NOTE - CLINICAL SUMMARY MEDICAL DECISION MAKING FREE TEXT BOX
weakness, generalized with L sided chest discomfort. r/o ACS with trop/EKG, rectal T is afebrile - will get labs, UA

## 2019-05-05 NOTE — CONSULT NOTE ADULT - SUBJECTIVE AND OBJECTIVE BOX
Neurology  Consult Note  05-05-19    Name:  JOSUE BUSH; 84y (01-Jul-1934)    Reason for Admission:   Weakness    Chief Complaint:  Left weakness, Slurred speech    HPI:  83yo RH  man with a PMHx of DM, HTN, HLD, CAD s/p CABGx3 presents with his wife with complaints of L. weakness and slurred speech. Patient is hard of hearing, A&Ox3. Fully independent LKN 9pm 5/4. Eating dinner when wife noticed slurred speech and facial asymmetry. Patient reports inability to move left arm or leg. Improved slightly as the night progressed. Patient fell asleep. Patient woke in the morning with persistent symptoms. EMS activated. Patient has no other complaints. Denies fevers, chills, ns, cp, sob, abd pain, n/v/d/c, or changes to weight or senses.   In the ED, VSS, labs grossly WNL, CTH NAD.     Review of Systems:  As states in HPI.    PMHx:   DM2 (diabetes mellitus, type 2)  Benign prostatic hyperplasia  3-vessel coronary artery disease  Pneumonia  HTN (hypertension)  DM (diabetes mellitus)    PFHx:   No pertinent family history in first degree relatives    PSuHx:   S/P CABG x 3  S/P cholecystectomy  No significant past surgical history    Medications:  MEDICATIONS  (STANDING):  aspirin enteric coated 325 milliGRAM(s) Oral daily  atorvastatin 80 milliGRAM(s) Oral at bedtime  dextrose 5%. 1000 milliLiter(s) (50 mL/Hr) IV Continuous <Continuous>  dextrose 50% Injectable 12.5 Gram(s) IV Push once  dextrose 50% Injectable 25 Gram(s) IV Push once  dextrose 50% Injectable 25 Gram(s) IV Push once  docusate sodium 100 milliGRAM(s) Oral daily  finasteride 5 milliGRAM(s) Oral daily  heparin  Injectable 5000 Unit(s) SubCutaneous every 8 hours  insulin glargine Injectable (LANTUS) 10 Unit(s) SubCutaneous at bedtime  insulin lispro (HumaLOG) corrective regimen sliding scale   SubCutaneous three times a day before meals  insulin lispro (HumaLOG) corrective regimen sliding scale   SubCutaneous at bedtime  insulin lispro Injectable (HumaLOG) 3 Unit(s) SubCutaneous three times a day with meals  polyethylene glycol 3350 17 Gram(s) Oral daily  senna 2 Tablet(s) Oral at bedtime  tamsulosin 0.4 milliGRAM(s) Oral at bedtime    MEDICATIONS  (PRN):  dextrose 40% Gel 15 Gram(s) Oral once PRN Blood Glucose LESS THAN 70 milliGRAM(s)/deciliter  glucagon  Injectable 1 milliGRAM(s) IntraMuscular once PRN Glucose LESS THAN 70 milligrams/deciliter    Vitals:  T(C): 36.7 (05-05-19 @ 12:12), Max: 37.2 (05-05-19 @ 03:10)  HR: 75 (05-05-19 @ 12:12) (75 - 83)  BP: 144/72 (05-05-19 @ 12:12) (144/72 - 160/60)  RR: 18 (05-05-19 @ 12:12) (16 - 18)  SpO2: 99% (05-05-19 @ 12:12) (98% - 99%)    Labs:                        10.0   5.46  )-----------( 156      ( 05 May 2019 05:15 )             32.3     05-05    136  |  99  |  30<H>  ----------------------------<  268<H>  4.6   |  26  |  1.78<H>    Ca    8.9      05 May 2019 05:15    TPro  6.5  /  Alb  3.2<L>  /  TBili  < 0.2<L>  /  DBili  x   /  AST  16  /  ALT  10  /  AlkPhos  81  05-05    CAPILLARY BLOOD GLUCOSE  POCT Blood Glucose.: 219 mg/dL (05 May 2019 13:08)    LIVER FUNCTIONS - ( 05 May 2019 05:15 )  Alb: 3.2 g/dL / Pro: 6.5 g/dL / ALK PHOS: 81 u/L / ALT: 10 u/L / AST: 16 u/L / GGT: x           PT/INR - ( 05 May 2019 11:44 )   PT: 11.0 SEC;   INR: 0.99     PTT - ( 05 May 2019 11:44 )  PTT:31.4 SEC      PHYSICAL EXAMINATION:  General: Well-developed, well nourished, in no acute distress.  Eyes: Conjunctiva and sclera clear.  Neurologic:  - Mental Status:  Alert, awake, oriented to person, place, and time; Speech is notably dysarthric, fluent with intact naming, repetition, and comprehension; Good overall fund of knowledge;  - Cranial Nerves II-XII:    II:  Visual fields are full to confrontation; Pupils are equal, round, and reactive to light;  III, IV, VI:  Extraocular movements are intact without nystagmus.  V:  Facial sensation is intact in the V1-V3 distribution bilaterally.  VII:  Left UMN lower facial droop  VIII:  Hearing is intact to finger rub.  IX, X:  Uvula is midline and soft palate rises symmetrically  XI:  Head turning and shoulder shrug are intact.  XII:  Tongue protudes in the midline.  - Motor: LUE drift >> LLE drift. Strength is 5/5 elsewhere. Normal muscle bulk and tone throughout.  - Reflexes:  2+ and symmetric at the biceps, triceps, brachioradialis, knees, and ankles.  Plantar responses flexor.  - Sensory:  Intact throughout to vibration, and joint-position, light touch, pin prick.  - Coordination:  Finger-nose-finger and heel-knee-shin intact without dysmetria.  Rapid alternating hand movements intact.    NIH Stroke Scale/Score (NIHSS)  5 points    INPUTS:  4: Facial palsy —> 2 = Partial paralysis (lower face)  5A: Left arm motor drift —> 1 = Drift, but doesn't hit bed  6A: Left leg motor drift —> 1 = Drift, but doesn't hit bed  10: Dysarthria —> 1 = Mild-moderate dysarthria: slurring but can be understood    Radiology:  < from: CT Head No Cont (05.05.19 @ 11:34) >  IMPRESSION:   No acute intracranial hemorrhage, mass effect, or shift of the midline   structures.    JASPREET AGRAWAL M.D., RADIOLOGY RESIDENT  This document has been electronically signed.  JULIETA REZA M.D., ATTENDING RADIOLOGIST  This document has been electronically signed. May  5 2019  1:12PM  < end of copied text >

## 2019-05-05 NOTE — CONSULT NOTE ADULT - ASSESSMENT
· Assessment	  84 year old male with htn, CAD s/p CABG, post op Afib, DM, dementia presenting with COOK, chronic diastolic chf p/w atypical CP/weakness and evidence of poorly controlled diabetes    Uncontrolled DM II:  FSSS  Endo eval noted.  Lantus/Lispro    Left Hemiparesis syndrome 2/2 likely R. hemispheric dysfunction:  MRI brain  Neuro f/up noted.    A Fib/CAD:  Tele  Cardio f/up noted.

## 2019-05-05 NOTE — ED ADULT NURSE NOTE - OBJECTIVE STATEMENT
83 yo M received to room 29 c/o weakness x 1 day, pt 85 yo M received to room 29 c/o generalized weakness x 1 day, hx CAD & DM, pt reports no dizziness, pt endorses no further physical complaints, denies CP/SOB, compliant with home insulin although FS elevated, appears in NAD, ED MD at bedside, 20G placed in RAC, labs drawn and sent with fluids infusing, will monitor break coverage RN: 83 yo M AAOx4 received to room 29 c/o generalized weakness x 1 day, hx CAD & DM, denies daily anticoagulation, pt denies dizziness and endorses no further physical complaints, denies CP/SOB, compliant with home insulin  as per wife as bedside although FS elevated, appears in NAD, ED MD at bedside, 20G placed in RAC, labs drawn and sent with fluids infusing, will monitor

## 2019-05-05 NOTE — ED ADULT TRIAGE NOTE - CHIEF COMPLAINT QUOTE
Pt arrives to ED c/o weakness since 5pm last night.  Pt fs on scene was 358.  Pt taking insulin as prescribed as per spouse. Pt arrives to ED c/o weakness since 5pm last night.  Pt fs on scene was 358.  Pt taking insulin as prescribed as per spouse.  FS in triage = 277.  EKG in triage.

## 2019-05-05 NOTE — H&P ADULT - PROBLEM/PLAN-4
Quality 265: Biopsy Follow-Up: Biopsy results reviewed, communicated, tracked, and documented
Detail Level: Detailed
DISPLAY PLAN FREE TEXT

## 2019-05-05 NOTE — H&P ADULT - NSHPLABSRESULTS_GEN_ALL_CORE
EKG: Sinus Rhythm 1st deg HB @ 80 bpm  HgA1c: 11.6%  H&H: 10/32.3 9 (chronic anemia)  BUN/Cr: 30/1.78  UA: glucosuria  CXR: No focal consolidation.    TTE on 11/2018: EF 66%  1. Moderate-severe mitral regurgitation. This may be  underestimated.  2. Normal left ventricular internal dimensions and wall  thicknesses.  3. Normal left ventricular systolic function. No segmental  wall motion abnormalities.  4. Normal right ventricular size and function.  5. Normal tricuspid valve. Mild tricuspid regurgitation.  6. Estimated pulmonary artery systolic pressure equals 43  mm Hg, assuming right atrial pressure equals 10  mm Hg,  consistent with mild pulmonary hypertension.

## 2019-05-05 NOTE — CONSULT NOTE ADULT - ASSESSMENT
85yo RH  man with a PMHx of DM, HTN, HLD, CAD s/p CABGx3 presents with his wife with complaints of L. weakness and slurred speech. Patient is hard of hearing, A&Ox3. Fully independent LKN 9pm . Eating dinner when wife noticed slurred speech and facial asymmetry. Patient reports inability to move left arm or leg. Improved slightly as the night progressed. Patient fell asleep. Patient woke in the morning with persistent symptoms. EMS activated. Patient has no other complaints. Denies fevers, chills, ns, cp, sob, abd pain, n/v/d/c, or changes to weight or senses.   In the ED, VSS, labs grossly WNL, CTH NAD.     NIHSS:  4: Facial palsy —> 2 = Partial paralysis (lower face)  5A: Left arm motor drift —> 1 = Drift, but doesn't hit bed  6A: Left leg motor drift —> 1 = Drift, but doesn't hit bed  10: Dysarthria —> 1 = Mild-moderate dysarthria: slurring but can be understood    Impression:    Left Hemiparesis syndrome 2/2 likely R. hemispheric dysfunction 2/2 ESUS vs.  vs. cryptogenic.     Plan:  [] permissive HTN for 24 hours up to 220/110  [] telemetry monitoring  [] Start ASA 81 daily  [] continue statin 80 mg daily, titrate to LDL <70  [] DVT ppx    Imaging   [] obtain TTE with bubble study  [] obtain EKG  [] MRI head non contrast, MRA head without contrast and MRA neck with contrast  [] STAT repeat CTH if change in neuro status  [] Consider loop recorder outpt vs inpatient if cryptogenic     Labs   [] CBC, BMP  [] Lipid panel  [] HbA1C    Other  [] PT/OT  [] dysphagia screen

## 2019-05-05 NOTE — ED PROVIDER NOTE - OBJECTIVE STATEMENT
85yo M with hx of DM/HTN, CAD presents with generalized weakness, L sided CP. EMS notes high blood sugar (400s) and high BP - pt continues to have generalized weakness in the ER with associated CP. Cards Dr. Villarreal, had stress test in Feb

## 2019-05-05 NOTE — PROGRESS NOTE ADULT - ASSESSMENT
84 year old male with htn, CAD s/p CABG, post op Afib, DM, dementia presenting with COOK, chronic diastolic chf p/w atypical CP/weakness and evidence of poorly controlled diabetes    1. Weakness  -sec to poor glucose control  -med eval      2. Acute/chronic diastolic CHF  volume status stable  may be slightly dry from hyperglycemia  hold lasix for nowy   continue bb, acei   echo with normal LV function, EF 66%, mod - severe MR     3. Mod-severe MR  no further w/u given hx of dementia   continue medical management     4.  CAD s/p CABG  chest pain resolved, no further workup s/p recent stress  v stable no chest pain or sob  continue  BB and statin     5 Afib, hx    no further reoccurrence. remains in SR   continue BB and ASA     6. HTN   bp stable, continue with current anti-htn meds     dvt ppx

## 2019-05-05 NOTE — H&P ADULT - NSHPSOCIALHISTORY_GEN_ALL_CORE
Pt , lives with spouse. Denies smoking, drinking or drugs. Usually walks independently without any aids.

## 2019-05-05 NOTE — H&P ADULT - PROBLEM SELECTOR PLAN 2
Daily glucose monitoring  insulin sliding scale  DASH 1800 ADA diet  serum HgA1C 11.6  resume home Lantus 12 units qhs  Start on Humalog 3 units TID w/ meals  House Endocrinology consulted

## 2019-05-05 NOTE — H&P ADULT - ASSESSMENT
85 yo male PMhx DM2, HTN, HLD, CAD, s/p CABG x3 p/w generalized weakness, slurred speech, L facial droop and L pronator drift admitted to tele for Stroke and Uncontrolled DM2.    +Stroke  +Uncontrolled DM2-->resumed Lantus, started Humalog 3units TID, endo c/s

## 2019-05-05 NOTE — CONSULT NOTE ADULT - SUBJECTIVE AND OBJECTIVE BOX
Patient is a 84y old  Male who presents with a chief complaint of Stroke (05 May 2019 16:25)      HPI:  85 yo male PMhx DM2, HTN, HLD, CAD, s/p CABG x3 p/w generalized weakness and slurred speech yesterday at 10pm. Pt poor historian, wife at bedside gave most of history. Pt's last seen normal yesterday morning @ 8am where pt is fully functional, ambulates freely without any aids. Pt had dinner at around 8pm. At 10 pm pt developed sudden generalized weakness, bilateral blurred vision and slurred speech. Pt checked his BFS 65. EMS was called, BFS by EMS was 293. Pt didn't eat anything after 1st BFS. Pt admits SOB at that time and polyuria. Pt denies chest pain, palpitations, facial palsy, headache, nausea, vomiting or abdominal pain.    In ED pt was given IV NS x1L.    On admission, pt found to have L facial droop and L pronator drift. (05 May 2019 11:58)      PAST MEDICAL & SURGICAL HISTORY:  DM2 (diabetes mellitus, type 2)  Benign prostatic hyperplasia  3-vessel coronary artery disease  HTN (hypertension)  S/P CABG x 3  S/P cholecystectomy      Review of Systems:   CONSTITUTIONAL: No fever,  or fatigue  RESPIRATORY: No cough, wheezing, chills or hemoptysis; No shortness of breath  CARDIOVASCULAR: No chest pain, palpitations, dizziness, or leg swelling  GASTROINTESTINAL: No abdominal or epigastric pain. No nausea, vomiting, or hematemesis;   NEUROLOGICAL: No headaches,           Allergies    No Known Allergies    Intolerances        Social History:     FAMILY HISTORY:  No pertinent family history in first degree relatives      MEDICATIONS  (STANDING):  aspirin enteric coated 325 milliGRAM(s) Oral daily  atorvastatin 80 milliGRAM(s) Oral at bedtime  dextrose 5%. 1000 milliLiter(s) (50 mL/Hr) IV Continuous <Continuous>  dextrose 50% Injectable 12.5 Gram(s) IV Push once  dextrose 50% Injectable 25 Gram(s) IV Push once  dextrose 50% Injectable 25 Gram(s) IV Push once  docusate sodium 100 milliGRAM(s) Oral daily  finasteride 5 milliGRAM(s) Oral daily  heparin  Injectable 5000 Unit(s) SubCutaneous every 8 hours  insulin glargine Injectable (LANTUS) 10 Unit(s) SubCutaneous at bedtime  insulin lispro (HumaLOG) corrective regimen sliding scale   SubCutaneous three times a day before meals  insulin lispro (HumaLOG) corrective regimen sliding scale   SubCutaneous at bedtime  insulin lispro Injectable (HumaLOG) 3 Unit(s) SubCutaneous three times a day with meals  polyethylene glycol 3350 17 Gram(s) Oral daily  senna 2 Tablet(s) Oral at bedtime  tamsulosin 0.4 milliGRAM(s) Oral at bedtime    MEDICATIONS  (PRN):  dextrose 40% Gel 15 Gram(s) Oral once PRN Blood Glucose LESS THAN 70 milliGRAM(s)/deciliter  glucagon  Injectable 1 milliGRAM(s) IntraMuscular once PRN Glucose LESS THAN 70 milligrams/deciliter      CAPILLARY BLOOD GLUCOSE      POCT Blood Glucose.: 117 mg/dL (05 May 2019 17:06)  POCT Blood Glucose.: 148 mg/dL (05 May 2019 15:23)  POCT Blood Glucose.: 219 mg/dL (05 May 2019 13:08)  POCT Blood Glucose.: 204 mg/dL (05 May 2019 08:54)  POCT Blood Glucose.: 277 mg/dL (05 May 2019 03:15)    I&O's Summary      PHYSICAL EXAM:    GENERAL: NAD  NECK: Supple, No JVD  CHEST/LUNG: Clear to auscultation bilaterally; No wheezing.  HEART: Regular rate and rhythm; No murmurs, rubs, or gallops  ABDOMEN: Soft, Nontender, Nondistended; Bowel sounds present  EXTREMITIES:  2+ Peripheral Pulses, No edema  NEUROLOGY: AAO/ Lt hemipharesis      LABS:                        10.0   5.46  )-----------( 156      ( 05 May 2019 05:15 )             32.3     05-05    136  |  99  |  30<H>  ----------------------------<  268<H>  4.6   |  26  |  1.78<H>    Ca    8.9      05 May 2019 05:15    TPro  6.5  /  Alb  3.2<L>  /  TBili  < 0.2<L>  /  DBili  x   /  AST  16  /  ALT  10  /  AlkPhos  81  05-05    PT/INR - ( 05 May 2019 11:44 )   PT: 11.0 SEC;   INR: 0.99          PTT - ( 05 May 2019 11:44 )  PTT:31.4 SEC      Urinalysis Basic - ( 05 May 2019 05:20 )    Color: YELLOW / Appearance: CLEAR / SG: > 1.040 / pH: 6.5  Gluc: 500 / Ketone: NEGATIVE  / Bili: NEGATIVE / Urobili: NORMAL   Blood: NEGATIVE / Protein: >300 / Nitrite: NEGATIVE   Leuk Esterase: NEGATIVE / RBC: 0-2 / WBC 0-2   Sq Epi: x / Non Sq Epi: FEW / Bacteria: FEW      CAPILLARY BLOOD GLUCOSE      POCT Blood Glucose.: 117 mg/dL (05 May 2019 17:06)  POCT Blood Glucose.: 148 mg/dL (05 May 2019 15:23)  POCT Blood Glucose.: 219 mg/dL (05 May 2019 13:08)  POCT Blood Glucose.: 204 mg/dL (05 May 2019 08:54)  POCT Blood Glucose.: 277 mg/dL (05 May 2019 03:15)                RADIOLOGY & ADDITIONAL TESTS:    Imaging Personally Reviewed:    Consultant(s) Notes Reviewed:      Care Discussed with Consultants/Other Providers:    Thanks for consult. Will follow.

## 2019-05-05 NOTE — CONSULT NOTE ADULT - SUBJECTIVE AND OBJECTIVE BOX
HPI:  85 yo male PMhx DM2, HTN, HLD, CAD, s/p CABG x3 p/w generalized weakness and slurred speech yesterday at 10pm. Pt poor historian, wife at bedside gave most of history. Pt's last seen normal yesterday morning @ 8am where pt is fully functional, ambulates freely without any aids. Pt had dinner at around 8pm. At 10 pm pt developed sudden generalized weakness, bilateral blurred vision and slurred speech. Pt checked his BFS 65. EMS was called, BFS by EMS was 293. Pt didn't eat anything after 1st BFS. Pt admits SOB at that time and polyuria. Pt denies chest pain, palpitations, facial palsy, headache, nausea, vomiting or abdominal pain.    In ED pt was given IV NS x1L.    On admission, pt found to have L facial droop and L pronator drift. (05 May 2019 11:58)    Endocrine History:  Hx mostly as per wife at bedside with pt [place].    Patient with a hx of Type 2 DM for >20 years. Used to have an endo in [place] but wants someone close by. Currently on lantus 12 units qhs and metformin 1000mg q daily. Not on any other meal time insulin. No other DM meds in past. Has been on lantus >10 years. Checks FS only in AM. Range 60s-150s. Reports has symptoms of hypoglycemia when low. Diet consists of oatmeal for breakfast, lunch and dinner chicken/fish with salad. Eats little rice or bread. No juices or soda.       PAST MEDICAL & SURGICAL HISTORY:  DM2 (diabetes mellitus, type 2)  Benign prostatic hyperplasia  3-vessel coronary artery disease  HTN (hypertension)  S/P CABG x 3  S/P cholecystectomy      FAMILY HISTORY:  No pertinent family history in first degree relatives      Social History:    Outpatient Medications:    MEDICATIONS  (STANDING):  aspirin enteric coated 325 milliGRAM(s) Oral daily  atorvastatin 80 milliGRAM(s) Oral at bedtime  dextrose 5%. 1000 milliLiter(s) (50 mL/Hr) IV Continuous <Continuous>  dextrose 50% Injectable 12.5 Gram(s) IV Push once  dextrose 50% Injectable 25 Gram(s) IV Push once  dextrose 50% Injectable 25 Gram(s) IV Push once  docusate sodium 100 milliGRAM(s) Oral daily  finasteride 5 milliGRAM(s) Oral daily  heparin  Injectable 5000 Unit(s) SubCutaneous every 8 hours  insulin glargine Injectable (LANTUS) 12 Unit(s) SubCutaneous at bedtime  insulin lispro (HumaLOG) corrective regimen sliding scale   SubCutaneous three times a day before meals  insulin lispro (HumaLOG) corrective regimen sliding scale   SubCutaneous at bedtime  insulin lispro Injectable (HumaLOG) 3 Unit(s) SubCutaneous three times a day with meals  polyethylene glycol 3350 17 Gram(s) Oral daily  senna 2 Tablet(s) Oral at bedtime  tamsulosin 0.4 milliGRAM(s) Oral at bedtime    MEDICATIONS  (PRN):  dextrose 40% Gel 15 Gram(s) Oral once PRN Blood Glucose LESS THAN 70 milliGRAM(s)/deciliter  glucagon  Injectable 1 milliGRAM(s) IntraMuscular once PRN Glucose LESS THAN 70 milligrams/deciliter      Allergies    No Known Allergies    Intolerances      Review of Systems:  Constitutional: No fever  Eyes: No blurry vision  Neuro: No tremors  HEENT: No pain  Cardiovascular: No chest pain, palpitations  Respiratory: No SOB, no cough  GI: No nausea, vomiting, abdominal pain  : No dysuria  Skin: no rash  Psych: no depression  Endocrine: no polyuria, polydipsia  Hem/lymph: no swelling  Osteoporosis: no fractures    ALL OTHER SYSTEMS REVIEWED AND NEGATIVE    UNABLE TO OBTAIN    PHYSICAL EXAM:  VITALS: T(C): 36.7 (05-05-19 @ 12:12)  T(F): 98 (05-05-19 @ 12:12), Max: 98.9 (05-05-19 @ 03:10)  HR: 75 (05-05-19 @ 12:12) (75 - 83)  BP: 144/72 (05-05-19 @ 12:12) (144/72 - 160/60)  RR:  (16 - 18)  SpO2:  (98% - 99%)  Wt(kg): --  GENERAL: NAD, well-groomed, well-developed  EYES: No proptosis, no lid lag, anicteric  HEENT:  Atraumatic, Normocephalic, moist mucous membranes  THYROID: Normal size, no palpable nodules  RESPIRATORY: Clear to auscultation bilaterally; No rales, rhonchi, wheezing  CARDIOVASCULAR: Regular rate and rhythm; No murmurs; no peripheral edema  GI: Soft, nontender, non distended, normal bowel sounds  SKIN: Dry, intact, No rashes or lesions  MUSCULOSKELETAL: Full range of motion, normal strength  NEURO: sensation intact, extraocular movements intact, no tremor  PSYCH: Alert and oriented x 3, normal affect, normal mood  CUSHING'S SIGNS: no striae    POCT Blood Glucose.: 219 mg/dL (05-05-19 @ 13:08)  POCT Blood Glucose.: 204 mg/dL (05-05-19 @ 08:54)  POCT Blood Glucose.: 277 mg/dL (05-05-19 @ 03:15)                            10.0   5.46  )-----------( 156      ( 05 May 2019 05:15 )             32.3       05-05    136  |  99  |  30<H>  ----------------------------<  268<H>  4.6   |  26  |  1.78<H>    EGFR if : 40  EGFR if non : 34    Ca    8.9      05-05    TPro  6.5  /  Alb  3.2<L>  /  TBili  < 0.2<L>  /  DBili  x   /  AST  16  /  ALT  10  /  AlkPhos  81  05-05      Thyroid Function Tests:      Hemoglobin A1C, Whole Blood: 11.6 % <H> [4.0 - 5.6] (05-05-19 @ 07:35)      05-05 Chol 215<H>  HDL 61<H> Trig 64      Radiology: HPI:  85 yo male PMhx DM2 (A1C 11.6), HTN, HLD, CAD, s/p CABG x3 p/w generalized weakness and slurred speech yesterday at 10pm. Pt poor historian, wife at bedside gave most of history. Pt's last seen normal yesterday morning @ 8am where pt is fully functional, ambulates freely without any aids. Pt had dinner at around 8pm. At 10 pm pt developed sudden generalized weakness, bilateral blurred vision and slurred speech. Pt checked his BFS 65. EMS was called, BFS by EMS was 293. Pt didn't eat anything after 1st BFS. Pt admits SOB at that time and polyuria. Pt denies chest pain, palpitations, facial palsy, headache, nausea, vomiting or abdominal pain.    In ED pt was given IV NS x1L.    On admission, pt found to have L facial droop and L pronator drift. (05 May 2019 11:58)    Endocrine History:  Hx mostly as per wife at bedside with pt [place].    Patient with a hx of Type 2 DM for >20 years. Used to have an endo in [place] but wants someone close by. Currently on lantus 12 units qhs and metformin 1000mg q daily. Not on any other meal time insulin. No other DM meds in past. Has been on lantus >10 years. Checks FS only in AM. Range 60s-150s. Reports has symptoms of hypoglycemia when low. Diet consists of oatmeal for breakfast, lunch and dinner chicken/fish with salad. Eats little rice or bread. No juices or soda. Follows with optho and does have complications of retinopathy and gets injections for treatment. Denies neuropathy.      PAST MEDICAL & SURGICAL HISTORY:  DM2 (diabetes mellitus, type 2)  Benign prostatic hyperplasia  3-vessel coronary artery disease  HTN (hypertension)  S/P CABG x 3  S/P cholecystectomy      FAMILY HISTORY:  No pertinent family history in first degree relatives      Social History:    Outpatient Medications:  · 	Lasix 20 mg oral tablet: 1 tab(s) orally once a day   · 	finasteride 5 mg oral tablet: 1 tab(s) orally once a day  · 	lisinopril 10 mg oral tablet: 1 tab(s) orally once a day  · 	tamsulosin 0.4 mg oral capsule: 1 cap(s) orally once a day (at bedtime)  · 	aspirin 325 mg oral delayed release tablet: 1 tab(s) orally once a day  · 	metoprolol succinate 50 mg oral tablet, extended release: 1 tab(s) orally once a day  · 	amLODIPine 5 mg oral tablet: 1 tab(s) orally once a day  · 	polyethylene glycol 3350 oral powder for reconstitution: 17 gram(s) orally once a day  · 	docusate sodium 100 mg oral capsule: 1 cap(s) orally once a day  · 	senna oral tablet: 2 tab(s) orally once a day (at bedtime)  · 	insulin glargine: 12 unit(s) subcutaneous once a day (at bedtime)  metformin 1000mg daily      MEDICATIONS  (STANDING):  aspirin enteric coated 325 milliGRAM(s) Oral daily  atorvastatin 80 milliGRAM(s) Oral at bedtime  dextrose 5%. 1000 milliLiter(s) (50 mL/Hr) IV Continuous <Continuous>  dextrose 50% Injectable 12.5 Gram(s) IV Push once  dextrose 50% Injectable 25 Gram(s) IV Push once  dextrose 50% Injectable 25 Gram(s) IV Push once  docusate sodium 100 milliGRAM(s) Oral daily  finasteride 5 milliGRAM(s) Oral daily  heparin  Injectable 5000 Unit(s) SubCutaneous every 8 hours  insulin glargine Injectable (LANTUS) 12 Unit(s) SubCutaneous at bedtime  insulin lispro (HumaLOG) corrective regimen sliding scale   SubCutaneous three times a day before meals  insulin lispro (HumaLOG) corrective regimen sliding scale   SubCutaneous at bedtime  insulin lispro Injectable (HumaLOG) 3 Unit(s) SubCutaneous three times a day with meals  polyethylene glycol 3350 17 Gram(s) Oral daily  senna 2 Tablet(s) Oral at bedtime  tamsulosin 0.4 milliGRAM(s) Oral at bedtime    MEDICATIONS  (PRN):  dextrose 40% Gel 15 Gram(s) Oral once PRN Blood Glucose LESS THAN 70 milliGRAM(s)/deciliter  glucagon  Injectable 1 milliGRAM(s) IntraMuscular once PRN Glucose LESS THAN 70 milligrams/deciliter      Allergies    No Known Allergies    Intolerances      Review of Systems:  Constitutional: No fever, +weakness  Eyes: No blurry vision  Neuro: No tremors  HEENT: No pain  Cardiovascular: No chest pain, palpitations  Respiratory: No SOB, no cough  GI: No nausea, vomiting, abdominal pain  : No dysuria  Skin: no rash  Endocrine: no polyuria, polydipsia  Hem/lymph: no swelling  Osteoporosis: no fractures    ALL OTHER SYSTEMS REVIEWED AND NEGATIVE      PHYSICAL EXAM:  VITALS: T(C): 36.7 (05-05-19 @ 12:12)  T(F): 98 (05-05-19 @ 12:12), Max: 98.9 (05-05-19 @ 03:10)  HR: 75 (05-05-19 @ 12:12) (75 - 83)  BP: 144/72 (05-05-19 @ 12:12) (144/72 - 160/60)  RR:  (16 - 18)  SpO2:  (98% - 99%)  Wt(kg): --  GENERAL: NAD, well-groomed, well-developed, thin  EYES: No proptosis, no lid lag, anicteric  HEENT:  Atraumatic, Normocephalic, moist mucous membranes, +[place]  THYROID: Normal size, no palpable nodules  RESPIRATORY: Clear to auscultation bilaterally; No rales, rhonchi, wheezing  CARDIOVASCULAR: Regular rate and rhythm; No murmurs; no peripheral edema  GI: Soft, nontender, non distended, normal bowel sounds  SKIN: Dry, intact, No rashes or lesions  MUSCULOSKELETAL: Full range of motion, normal strength  NEURO: sensation intact, extraocular movements intact, no tremor  PSYCH: Alert and oriented x 3, normal affect, normal mood  CUSHING'S SIGNS: no striae    POCT Blood Glucose.: 219 mg/dL (05-05-19 @ 13:08)  POCT Blood Glucose.: 204 mg/dL (05-05-19 @ 08:54)  POCT Blood Glucose.: 277 mg/dL (05-05-19 @ 03:15)                            10.0   5.46  )-----------( 156      ( 05 May 2019 05:15 )             32.3       05-05    136  |  99  |  30<H>  ----------------------------<  268<H>  4.6   |  26  |  1.78<H>    EGFR if : 40  EGFR if non : 34    Ca    8.9      05-05    TPro  6.5  /  Alb  3.2<L>  /  TBili  < 0.2<L>  /  DBili  x   /  AST  16  /  ALT  10  /  AlkPhos  81  05-05      Hemoglobin A1C, Whole Blood: 11.6 % <H> [4.0 - 5.6] (05-05-19 @ 07:35)      05-05 Chol 215<H>  HDL 61<H> Trig 64

## 2019-05-05 NOTE — CONSULT NOTE ADULT - ASSESSMENT
85 yo male PMhx DM2 (A1C 11.6), HTN, HLD, CAD, s/p CABG x3 p/w generalized weakness and slurred speech. Admitted for CVA r/o. Endocrine consulted for uncontrolled Type 2 DM.

## 2019-05-05 NOTE — H&P ADULT - NSICDXPASTMEDICALHX_GEN_ALL_CORE_FT
PAST MEDICAL HISTORY:  3-vessel coronary artery disease     Benign prostatic hyperplasia     DM2 (diabetes mellitus, type 2)     HTN (hypertension)

## 2019-05-05 NOTE — H&P ADULT - PROBLEM SELECTOR PLAN 1
tele monitor  house neuro consulted  Neuro checks Q6h  Permissive HTN  PT/OT  FLP, HgA1c  c/w ASA   start Lipitor 80mg qhs

## 2019-05-06 LAB
ANION GAP SERPL CALC-SCNC: 9 MMO/L — SIGNIFICANT CHANGE UP (ref 7–14)
BACTERIA UR CULT: SIGNIFICANT CHANGE UP
BUN SERPL-MCNC: 21 MG/DL — SIGNIFICANT CHANGE UP (ref 7–23)
CALCIUM SERPL-MCNC: 8.8 MG/DL — SIGNIFICANT CHANGE UP (ref 8.4–10.5)
CHLORIDE SERPL-SCNC: 108 MMOL/L — HIGH (ref 98–107)
CO2 SERPL-SCNC: 26 MMOL/L — SIGNIFICANT CHANGE UP (ref 22–31)
CREAT SERPL-MCNC: 1.44 MG/DL — HIGH (ref 0.5–1.3)
GLUCOSE SERPL-MCNC: 76 MG/DL — SIGNIFICANT CHANGE UP (ref 70–99)
HCT VFR BLD CALC: 33.5 % — LOW (ref 39–50)
HGB BLD-MCNC: 10.3 G/DL — LOW (ref 13–17)
MCHC RBC-ENTMCNC: 25.9 PG — LOW (ref 27–34)
MCHC RBC-ENTMCNC: 30.7 % — LOW (ref 32–36)
MCV RBC AUTO: 84.2 FL — SIGNIFICANT CHANGE UP (ref 80–100)
NRBC # FLD: 0 K/UL — SIGNIFICANT CHANGE UP (ref 0–0)
PLATELET # BLD AUTO: 151 K/UL — SIGNIFICANT CHANGE UP (ref 150–400)
PMV BLD: 9.9 FL — SIGNIFICANT CHANGE UP (ref 7–13)
POTASSIUM SERPL-MCNC: 3.8 MMOL/L — SIGNIFICANT CHANGE UP (ref 3.5–5.3)
POTASSIUM SERPL-SCNC: 3.8 MMOL/L — SIGNIFICANT CHANGE UP (ref 3.5–5.3)
RBC # BLD: 3.98 M/UL — LOW (ref 4.2–5.8)
RBC # FLD: 13.2 % — SIGNIFICANT CHANGE UP (ref 10.3–14.5)
SODIUM SERPL-SCNC: 143 MMOL/L — SIGNIFICANT CHANGE UP (ref 135–145)
SPECIMEN SOURCE: SIGNIFICANT CHANGE UP
WBC # BLD: 4.96 K/UL — SIGNIFICANT CHANGE UP (ref 3.8–10.5)
WBC # FLD AUTO: 4.96 K/UL — SIGNIFICANT CHANGE UP (ref 3.8–10.5)

## 2019-05-06 PROCEDURE — 70544 MR ANGIOGRAPHY HEAD W/O DYE: CPT | Mod: 26,59

## 2019-05-06 PROCEDURE — 92610 EVALUATE SWALLOWING FUNCTION: CPT

## 2019-05-06 PROCEDURE — 99232 SBSQ HOSP IP/OBS MODERATE 35: CPT

## 2019-05-06 PROCEDURE — 99223 1ST HOSP IP/OBS HIGH 75: CPT | Mod: GC

## 2019-05-06 PROCEDURE — 70548 MR ANGIOGRAPHY NECK W/DYE: CPT | Mod: 26

## 2019-05-06 PROCEDURE — 70551 MRI BRAIN STEM W/O DYE: CPT | Mod: 26

## 2019-05-06 PROCEDURE — 93880 EXTRACRANIAL BILAT STUDY: CPT | Mod: 26

## 2019-05-06 RX ORDER — INSULIN GLARGINE 100 [IU]/ML
8 INJECTION, SOLUTION SUBCUTANEOUS AT BEDTIME
Qty: 0 | Refills: 0 | Status: DISCONTINUED | OUTPATIENT
Start: 2019-05-06 | End: 2019-05-06

## 2019-05-06 RX ORDER — INSULIN GLARGINE 100 [IU]/ML
4 INJECTION, SOLUTION SUBCUTANEOUS AT BEDTIME
Qty: 0 | Refills: 0 | Status: DISCONTINUED | OUTPATIENT
Start: 2019-05-06 | End: 2019-05-07

## 2019-05-06 RX ADMIN — HEPARIN SODIUM 5000 UNIT(S): 5000 INJECTION INTRAVENOUS; SUBCUTANEOUS at 23:31

## 2019-05-06 RX ADMIN — SENNA PLUS 2 TABLET(S): 8.6 TABLET ORAL at 23:31

## 2019-05-06 RX ADMIN — HEPARIN SODIUM 5000 UNIT(S): 5000 INJECTION INTRAVENOUS; SUBCUTANEOUS at 13:51

## 2019-05-06 RX ADMIN — FINASTERIDE 5 MILLIGRAM(S): 5 TABLET, FILM COATED ORAL at 12:41

## 2019-05-06 RX ADMIN — Medication 3 UNIT(S): at 13:44

## 2019-05-06 RX ADMIN — ATORVASTATIN CALCIUM 80 MILLIGRAM(S): 80 TABLET, FILM COATED ORAL at 23:31

## 2019-05-06 RX ADMIN — TAMSULOSIN HYDROCHLORIDE 0.4 MILLIGRAM(S): 0.4 CAPSULE ORAL at 23:31

## 2019-05-06 RX ADMIN — Medication 3 UNIT(S): at 18:28

## 2019-05-06 RX ADMIN — HEPARIN SODIUM 5000 UNIT(S): 5000 INJECTION INTRAVENOUS; SUBCUTANEOUS at 06:15

## 2019-05-06 RX ADMIN — Medication 100 MILLIGRAM(S): at 12:41

## 2019-05-06 RX ADMIN — POLYETHYLENE GLYCOL 3350 17 GRAM(S): 17 POWDER, FOR SOLUTION ORAL at 12:41

## 2019-05-06 RX ADMIN — INSULIN GLARGINE 4 UNIT(S): 100 INJECTION, SOLUTION SUBCUTANEOUS at 23:57

## 2019-05-06 RX ADMIN — Medication 325 MILLIGRAM(S): at 12:42

## 2019-05-06 NOTE — OCCUPATIONAL THERAPY INITIAL EVALUATION ADULT - PERTINENT HX OF CURRENT PROBLEM, REHAB EVAL
Pt is a 84 year old male with hx of DM2, HTN, HLD, CAD, s/p CABG x3, who presented to [facility] on 5/5/19 with generalized weakness and slurred speech. CT Head No Contrast on 5/5/19 displayed no acute intracranial hemorrhage, mass effect, or shift of the midline structures.

## 2019-05-06 NOTE — OCCUPATIONAL THERAPY INITIAL EVALUATION ADULT - PLANNED THERAPY INTERVENTIONS, OT EVAL
ADL retraining/bed mobility training/transfer training/balance training/neuromuscular re-education/ROM/strengthening
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
## 2019-05-06 NOTE — PROGRESS NOTE ADULT - ASSESSMENT
· Assessment	  84 year old male with htn, CAD s/p CABG, post op Afib, DM, dementia presenting with COOK, chronic diastolic chf p/w atypical CP/weakness and evidence of poorly controlled diabetes    Uncontrolled DM II:  FSSS  Endo f/up  Lantus/Lispro    Acute Rt pontine infarct:  MRI brain reviewed.  Neuro f/up noted.    A Fib/CAD:  Tele  Cardio f/up noted.    Accelerated HTN:  Permissible HTN  Will monitor

## 2019-05-06 NOTE — PROGRESS NOTE ADULT - SUBJECTIVE AND OBJECTIVE BOX
CARDIOLOGY FOLLOW UP NOTE - DR. PAYNE    Subjective:    events notes  no chest pain, sob      PHYSICAL EXAM:  T(C): 36.5 (05-06-19 @ 12:43), Max: 36.9 (05-05-19 @ 16:57)  HR: 70 (05-06-19 @ 12:43) (62 - 74)  BP: 193/94 (05-06-19 @ 12:43) (152/90 - 193/94)  RR: 18 (05-06-19 @ 12:43) (17 - 18)  SpO2: 100% (05-06-19 @ 12:43) (98% - 100%)  Wt(kg): --  I&O's Summary      Appearance: Normal	  Cardiovascular: Normal S1 S2,RRR, No JVD, No murmurs  Respiratory: Lungs clear to auscultation	  Gastrointestinal:  Soft, Non-tender, + BS	  Extremities: Normal range of motion, No clubbing, cyanosis or edema    MEDICATIONS  (STANDING):  aspirin enteric coated 325 milliGRAM(s) Oral daily  atorvastatin 80 milliGRAM(s) Oral at bedtime  dextrose 5%. 1000 milliLiter(s) (50 mL/Hr) IV Continuous <Continuous>  dextrose 50% Injectable 12.5 Gram(s) IV Push once  dextrose 50% Injectable 25 Gram(s) IV Push once  dextrose 50% Injectable 25 Gram(s) IV Push once  docusate sodium 100 milliGRAM(s) Oral daily  finasteride 5 milliGRAM(s) Oral daily  heparin  Injectable 5000 Unit(s) SubCutaneous every 8 hours  insulin glargine Injectable (LANTUS) 8 Unit(s) SubCutaneous at bedtime  insulin lispro (HumaLOG) corrective regimen sliding scale   SubCutaneous three times a day before meals  insulin lispro (HumaLOG) corrective regimen sliding scale   SubCutaneous at bedtime  insulin lispro Injectable (HumaLOG) 3 Unit(s) SubCutaneous three times a day with meals  polyethylene glycol 3350 17 Gram(s) Oral daily  senna 2 Tablet(s) Oral at bedtime  tamsulosin 0.4 milliGRAM(s) Oral at bedtime      TELEMETRY: 	    ECG:  	  RADIOLOGY:   DIAGNOSTIC TESTING:  [ ] Echocardiogram:  [ ] Catheterization:  [ ] Stress Test:    OTHER: 	    LABS:	 	    CARDIAC MARKERS:                                10.3   4.96  )-----------( 151      ( 06 May 2019 05:51 )             33.5     05-06    143  |  108<H>  |  21  ----------------------------<  76  3.8   |  26  |  1.44<H>    Ca    8.8      06 May 2019 05:51    TPro  6.5  /  Alb  3.2<L>  /  TBili  < 0.2<L>  /  DBili  x   /  AST  16  /  ALT  10  /  AlkPhos  81  05-05    proBNP:   PT/INR - ( 05 May 2019 11:44 )   PT: 11.0 SEC;   INR: 0.99          PTT - ( 05 May 2019 11:44 )  PTT:31.4 SEC  Lipid Profile:   HgA1c:

## 2019-05-06 NOTE — OCCUPATIONAL THERAPY INITIAL EVALUATION ADULT - MD ORDER
Occupational Therapy (OT) to evaluate and treat. Occupational Therapy (OT) to evaluate and treat. Ambulate with assistance.

## 2019-05-06 NOTE — PROGRESS NOTE ADULT - SUBJECTIVE AND OBJECTIVE BOX
Patient is a 84y old  Male who presents with a chief complaint of Stroke (06 May 2019 15:06)      SUBJECTIVE / OVERNIGHT EVENTS:    Events noted.  No CP/SOB/Dizziness      MEDICATIONS  (STANDING):  aspirin enteric coated 325 milliGRAM(s) Oral daily  atorvastatin 80 milliGRAM(s) Oral at bedtime  dextrose 5%. 1000 milliLiter(s) (50 mL/Hr) IV Continuous <Continuous>  dextrose 50% Injectable 12.5 Gram(s) IV Push once  dextrose 50% Injectable 25 Gram(s) IV Push once  dextrose 50% Injectable 25 Gram(s) IV Push once  docusate sodium 100 milliGRAM(s) Oral daily  finasteride 5 milliGRAM(s) Oral daily  heparin  Injectable 5000 Unit(s) SubCutaneous every 8 hours  insulin glargine Injectable (LANTUS) 8 Unit(s) SubCutaneous at bedtime  insulin lispro (HumaLOG) corrective regimen sliding scale   SubCutaneous three times a day before meals  insulin lispro (HumaLOG) corrective regimen sliding scale   SubCutaneous at bedtime  insulin lispro Injectable (HumaLOG) 3 Unit(s) SubCutaneous three times a day with meals  polyethylene glycol 3350 17 Gram(s) Oral daily  senna 2 Tablet(s) Oral at bedtime  tamsulosin 0.4 milliGRAM(s) Oral at bedtime    MEDICATIONS  (PRN):  dextrose 40% Gel 15 Gram(s) Oral once PRN Blood Glucose LESS THAN 70 milliGRAM(s)/deciliter  glucagon  Injectable 1 milliGRAM(s) IntraMuscular once PRN Glucose LESS THAN 70 milligrams/deciliter        CAPILLARY BLOOD GLUCOSE      POCT Blood Glucose.: 147 mg/dL (06 May 2019 18:09)  POCT Blood Glucose.: 104 mg/dL (06 May 2019 13:35)  POCT Blood Glucose.: 159 mg/dL (05 May 2019 22:47)  POCT Blood Glucose.: 170 mg/dL (05 May 2019 21:01)    I&O's Summary      PHYSICAL EXAM:    NECK: Supple, No JVD  CHEST/LUNG: Clear to auscultation bilaterally; No wheezing.  HEART: Regular rate and rhythm; No murmurs, rubs, or gallops  ABDOMEN: Soft, Nontender, Nondistended; Bowel sounds present  EXTREMITIES:   No edema  NEUROLOGY: AAO       LABS:                        10.3   4.96  )-----------( 151      ( 06 May 2019 05:51 )             33.5     05-06    143  |  108<H>  |  21  ----------------------------<  76  3.8   |  26  |  1.44<H>    Ca    8.8      06 May 2019 05:51    TPro  6.5  /  Alb  3.2<L>  /  TBili  < 0.2<L>  /  DBili  x   /  AST  16  /  ALT  10  /  AlkPhos  81  05-05    PT/INR - ( 05 May 2019 11:44 )   PT: 11.0 SEC;   INR: 0.99          PTT - ( 05 May 2019 11:44 )  PTT:31.4 SEC      Urinalysis Basic - ( 05 May 2019 05:20 )    Color: YELLOW / Appearance: CLEAR / SG: > 1.040 / pH: 6.5  Gluc: 500 / Ketone: NEGATIVE  / Bili: NEGATIVE / Urobili: NORMAL   Blood: NEGATIVE / Protein: >300 / Nitrite: NEGATIVE   Leuk Esterase: NEGATIVE / RBC: 0-2 / WBC 0-2   Sq Epi: x / Non Sq Epi: FEW / Bacteria: FEW      CAPILLARY BLOOD GLUCOSE      POCT Blood Glucose.: 147 mg/dL (06 May 2019 18:09)  POCT Blood Glucose.: 104 mg/dL (06 May 2019 13:35)  POCT Blood Glucose.: 159 mg/dL (05 May 2019 22:47)  POCT Blood Glucose.: 170 mg/dL (05 May 2019 21:01)    05-05 @ 05:37  Culture-urine   NO GROWTH AT 24 HOURS  Culture results --  method type --  Organism --  Organism Identification --  Specimen source URINE MIDSTREAM           05-05 @ 05:37  Culture blood --  Culture results --  Gram stain --  Gram stain blood --  Method type --  Organism --  Organism identification --  Specimen source URINE MIDSTREAM      RADIOLOGY & ADDITIONAL TESTS:    Imaging Personally Reviewed:    Consultant(s) Notes Reviewed:      Care Discussed with Consultants/Other Providers:

## 2019-05-06 NOTE — PROGRESS NOTE ADULT - ASSESSMENT
84 year old male with htn, CAD s/p CABG, post op Afib, DM, dementia presenting with COOK, chronic diastolic chf p/w atypical CP/weakness, slurred speech,  and evidence of poorly controlled diabetes    1. Acute CVA  -in setting of uncontrolled dm, htn  -neuro f/u  -asa  -await further mra    2. Acute/chronic diastolic CHF  -volume status stable  -hold lasix for now  -eventual restart continue bb, acei   -echo with normal LV function, EF 66%, mod - severe MR     3. Mod-severe MR  -stable  -continue medical management     4.  CAD s/p CABG  -stable   -continue statin     5 Afib, hx    no further reoccurrence,  remains in SR   continue ASA     6. HTN   permissive htn per neruro     7. ckd  renal eval called          dvt ppx

## 2019-05-06 NOTE — PROGRESS NOTE ADULT - SUBJECTIVE AND OBJECTIVE BOX
Chief Complaint: t2dm    History:  no n/v, tolerates po, no hypoglycemia, but was below target range this AM on serum BG    MEDICATIONS  (STANDING):  aspirin enteric coated 325 milliGRAM(s) Oral daily  atorvastatin 80 milliGRAM(s) Oral at bedtimeoglycemia   dextrose 5%. 1000 milliLiter(s) (50 mL/Hr) IV Continuous <Continuous>  dextrose 50% Injectable 12.5 Gram(s) IV Push once  dextrose 50% Injectable 25 Gram(s) IV Push once  dextrose 50% Injectable 25 Gram(s) IV Push once  docusate sodium 100 milliGRAM(s) Oral daily  finasteride 5 milliGRAM(s) Oral daily  heparin  Injectable 5000 Unit(s) SubCutaneous every 8 hours  insulin glargine Injectable (LANTUS) 8 Unit(s) SubCutaneous at bedtime  insulin lispro (HumaLOG) corrective regimen sliding scale   SubCutaneous three times a day before meals  insulin lispro (HumaLOG) corrective regimen sliding scale   SubCutaneous at bedtime  insulin lispro Injectable (HumaLOG) 3 Unit(s) SubCutaneous three times a day with meals  polyethylene glycol 3350 17 Gram(s) Oral daily  senna 2 Tablet(s) Oral at bedtime  tamsulosin 0.4 milliGRAM(s) Oral at bedtime    MEDICATIONS  (PRN):  dextrose 40% Gel 15 Gram(s) Oral once PRN Blood Glucose LESS THAN 70 milliGRAM(s)/deciliter  glucagon  Injectable 1 milliGRAM(s) IntraMuscular once PRN Glucose LESS THAN 70 milligrams/deciliter      Allergies    No Known Allergies    Intolerances      Review of Systems:  Constitutional: No fever  Eyes: No blurry vision    ALL OTHER SYSTEMS REVIEWED AND NEGATIVE        PHYSICAL EXAM:  VITALS: T(C): 36.5 (05-06-19 @ 12:43)  T(F): 97.7 (05-06-19 @ 12:43), Max: 98.4 (05-05-19 @ 16:57)  HR: 70 (05-06-19 @ 12:43) (62 - 74)  BP: 193/94 (05-06-19 @ 12:43) (152/90 - 193/94)  RR:  (17 - 18)  SpO2:  (98% - 100%)  Wt(kg): --  GENERAL: NAD,  well-developed  GI: Soft, nontender, non distended  SKIN: Dry, intact, No rashes or lesions  PSYCH: Alert and oriented x 3, normal affect, normal mood      CAPILLARY BLOOD GLUCOSE      POCT Blood Glucose.: 104 mg/dL (06 May 2019 13:35)  POCT Blood Glucose.: 159 mg/dL (05 May 2019 22:47)  POCT Blood Glucose.: 170 mg/dL (05 May 2019 21:01)  POCT Blood Glucose.: 117 mg/dL (05 May 2019 17:06)  POCT Blood Glucose.: 148 mg/dL (05 May 2019 15:23)      05-06    143  |  108<H>  |  21  ----------------------------<  76  3.8   |  26  |  1.44<H>    EGFR if : 51  EGFR if non : 44    Ca    8.8      05-06    TPro  6.5  /  Alb  3.2<L>  /  TBili  < 0.2<L>  /  DBili  x   /  AST  16  /  ALT  10  /  AlkPhos  81  05-05          Thyroid Function Tests:      Hemoglobin A1C, Whole Blood: 11.6 % <H> [4.0 - 5.6] (05-05-19 @ 07:35)

## 2019-05-06 NOTE — PROGRESS NOTE ADULT - ASSESSMENT
83 yo male PMhx DM2 (A1C 11.6), HTN, HLD, CAD, s/p CABG x3 p/w generalized weakness and slurred speech. Admitted for CVA r/o. Endocrine consulted for uncontrolled Type 2 DM.

## 2019-05-06 NOTE — SWALLOW BEDSIDE ASSESSMENT ADULT - ADDITIONAL RECOMMENDATIONS
Monitor PO intake/tolerance. Monitor for any evolving neurological changes that may impact on oral intake.

## 2019-05-06 NOTE — CHART NOTE - NSCHARTNOTEFT_GEN_A_CORE
Spoke to radiology regarding result of MRI of brain- IMPRESSION:  Acute right pontine infarct. Right internal carotid artery absent flow void suspicious for decreased flow or thrombosis. CT or MR angiography of the head and neck is advised for further evaluation.\  Spoke to Neurology stroke team- recommended to do MRA of head without contrast and MRA neck with contrast. Will followup.

## 2019-05-06 NOTE — SWALLOW BEDSIDE ASSESSMENT ADULT - SWALLOW EVAL: DIAGNOSIS
Patient presents with OroPharyngeal Stage Dysphagia characterized by adequate oral containment, adequate chewing for solid consistency, adequate bolus manipulation and transfer with adequate oral clearance. There is laryngeal elevation upon palpation, suspect delayed initiation of the pharyngeal swallow. There were overt signs of impaired airway protection for Thin Liquids evident by cough response post swallow. Patient tolerated nectar thick liquids without overt signs of impaired airway protection.

## 2019-05-06 NOTE — OCCUPATIONAL THERAPY INITIAL EVALUATION ADULT - DIAGNOSIS, OT EVAL
r/o CVA; Mild Left UE weakness; Decreased standing balance; Decreased functional mobility; Decreased ADL management

## 2019-05-06 NOTE — OCCUPATIONAL THERAPY INITIAL EVALUATION ADULT - GENERAL OBSERVATIONS, REHAB EVAL
Pt. received semisupine on stretcher in Emergency Department. No acute distress. Patient agreed to evaluation from Occupational Therapist. +Heplock, +Tele.

## 2019-05-06 NOTE — PROGRESS NOTE ADULT - PROBLEM SELECTOR PLAN 1
target bg 100-180 mg/dl  would decrease lantus to 8 units sq qhs based on serum BG below target.  c/w humalog 3 units sq tid ac  c/w low humalog correction scale for both ac and hs    dc planning- Basal oral vs basla bolus. depending on inpatient requirements.  Pt was on lantus at home.  Would optimize this dose.  Full dc plan TBD

## 2019-05-07 LAB
ANION GAP SERPL CALC-SCNC: 10 MMO/L — SIGNIFICANT CHANGE UP (ref 7–14)
BUN SERPL-MCNC: 21 MG/DL — SIGNIFICANT CHANGE UP (ref 7–23)
CALCIUM SERPL-MCNC: 8.6 MG/DL — SIGNIFICANT CHANGE UP (ref 8.4–10.5)
CHLORIDE SERPL-SCNC: 103 MMOL/L — SIGNIFICANT CHANGE UP (ref 98–107)
CO2 SERPL-SCNC: 24 MMOL/L — SIGNIFICANT CHANGE UP (ref 22–31)
CREAT SERPL-MCNC: 1.36 MG/DL — HIGH (ref 0.5–1.3)
GLUCOSE SERPL-MCNC: 392 MG/DL — HIGH (ref 70–99)
HCT VFR BLD CALC: 34.5 % — LOW (ref 39–50)
HGB BLD-MCNC: 10.8 G/DL — LOW (ref 13–17)
MAGNESIUM SERPL-MCNC: 2 MG/DL — SIGNIFICANT CHANGE UP (ref 1.6–2.6)
MCHC RBC-ENTMCNC: 26.4 PG — LOW (ref 27–34)
MCHC RBC-ENTMCNC: 31.3 % — LOW (ref 32–36)
MCV RBC AUTO: 84.4 FL — SIGNIFICANT CHANGE UP (ref 80–100)
NRBC # FLD: 0 K/UL — SIGNIFICANT CHANGE UP (ref 0–0)
PHOSPHATE SERPL-MCNC: 3.5 MG/DL — SIGNIFICANT CHANGE UP (ref 2.5–4.5)
PLATELET # BLD AUTO: 148 K/UL — LOW (ref 150–400)
PMV BLD: 10.3 FL — SIGNIFICANT CHANGE UP (ref 7–13)
POTASSIUM SERPL-MCNC: 4.6 MMOL/L — SIGNIFICANT CHANGE UP (ref 3.5–5.3)
POTASSIUM SERPL-SCNC: 4.6 MMOL/L — SIGNIFICANT CHANGE UP (ref 3.5–5.3)
RBC # BLD: 4.09 M/UL — LOW (ref 4.2–5.8)
RBC # FLD: 13.2 % — SIGNIFICANT CHANGE UP (ref 10.3–14.5)
SODIUM SERPL-SCNC: 137 MMOL/L — SIGNIFICANT CHANGE UP (ref 135–145)
WBC # BLD: 7.25 K/UL — SIGNIFICANT CHANGE UP (ref 3.8–10.5)
WBC # FLD AUTO: 7.25 K/UL — SIGNIFICANT CHANGE UP (ref 3.8–10.5)

## 2019-05-07 PROCEDURE — 99222 1ST HOSP IP/OBS MODERATE 55: CPT | Mod: GC

## 2019-05-07 PROCEDURE — 99232 SBSQ HOSP IP/OBS MODERATE 35: CPT

## 2019-05-07 PROCEDURE — 70498 CT ANGIOGRAPHY NECK: CPT | Mod: 26

## 2019-05-07 PROCEDURE — 70496 CT ANGIOGRAPHY HEAD: CPT | Mod: 26

## 2019-05-07 PROCEDURE — 99233 SBSQ HOSP IP/OBS HIGH 50: CPT | Mod: GC

## 2019-05-07 RX ORDER — INSULIN LISPRO 100/ML
2 VIAL (ML) SUBCUTANEOUS
Qty: 0 | Refills: 0 | Status: DISCONTINUED | OUTPATIENT
Start: 2019-05-07 | End: 2019-05-13

## 2019-05-07 RX ORDER — INSULIN GLARGINE 100 [IU]/ML
6 INJECTION, SOLUTION SUBCUTANEOUS AT BEDTIME
Qty: 0 | Refills: 0 | Status: DISCONTINUED | OUTPATIENT
Start: 2019-05-07 | End: 2019-05-07

## 2019-05-07 RX ORDER — INSULIN GLARGINE 100 [IU]/ML
6 INJECTION, SOLUTION SUBCUTANEOUS AT BEDTIME
Qty: 0 | Refills: 0 | Status: DISCONTINUED | OUTPATIENT
Start: 2019-05-07 | End: 2019-05-08

## 2019-05-07 RX ADMIN — Medication 100 MILLIGRAM(S): at 13:05

## 2019-05-07 RX ADMIN — Medication 1: at 17:44

## 2019-05-07 RX ADMIN — Medication 2: at 09:06

## 2019-05-07 RX ADMIN — TAMSULOSIN HYDROCHLORIDE 0.4 MILLIGRAM(S): 0.4 CAPSULE ORAL at 21:23

## 2019-05-07 RX ADMIN — ATORVASTATIN CALCIUM 80 MILLIGRAM(S): 80 TABLET, FILM COATED ORAL at 21:23

## 2019-05-07 RX ADMIN — FINASTERIDE 5 MILLIGRAM(S): 5 TABLET, FILM COATED ORAL at 13:05

## 2019-05-07 RX ADMIN — HEPARIN SODIUM 5000 UNIT(S): 5000 INJECTION INTRAVENOUS; SUBCUTANEOUS at 05:37

## 2019-05-07 RX ADMIN — Medication 3 UNIT(S): at 13:05

## 2019-05-07 RX ADMIN — Medication 3 UNIT(S): at 09:06

## 2019-05-07 RX ADMIN — INSULIN GLARGINE 6 UNIT(S): 100 INJECTION, SOLUTION SUBCUTANEOUS at 21:23

## 2019-05-07 RX ADMIN — Medication 325 MILLIGRAM(S): at 13:05

## 2019-05-07 RX ADMIN — Medication 2 UNIT(S): at 17:44

## 2019-05-07 RX ADMIN — SENNA PLUS 2 TABLET(S): 8.6 TABLET ORAL at 21:23

## 2019-05-07 RX ADMIN — HEPARIN SODIUM 5000 UNIT(S): 5000 INJECTION INTRAVENOUS; SUBCUTANEOUS at 13:05

## 2019-05-07 NOTE — CONSULT NOTE ADULT - SUBJECTIVE AND OBJECTIVE BOX
HPI:  83 yo male PMhx DM2, HTN, HLD, CAD, s/p CABG x3 p/w generalized weakness and slurred speech yesterday at 10pm. Pt poor historian, wife at bedside gave most of history. Pt's last seen normal yesterday morning @ 8am where pt is fully functional, ambulates freely without any aids. Pt had dinner at around 8pm. At 10 pm pt developed sudden generalized weakness, bilateral blurred vision and slurred speech. Pt checked his BFS 65. EMS was called, BFS by EMS was 293. Pt didn't eat anything after 1st BFS. Pt admits SOB at that time and polyuria. Pt denies chest pain, palpitations, facial palsy, headache, nausea, vomiting or abdominal pain.    In ED pt was given IV NS x1L.    On admission, pt found to have L facial droop and L pronator drift. (05 May 2019 11:58)  MRI_  MPRESSION:    Acute right pontine infarct.    Right internal carotid artery absent flow void suspicious for decreased   flow or thrombosis. CT or MR angiography of the head and neck is advised   for further evaluation.    swallow eval-> mechanical soft with nectar thick     REVIEW OF SYSTEMS: No chest pain, shortness of breath, nausea, vomiting or diarhea.      PAST MEDICAL & SURGICAL HISTORY  DM2 (diabetes mellitus, type 2)  Benign prostatic hyperplasia  3-vessel coronary artery disease  Pneumonia  HTN (hypertension)  DM (diabetes mellitus)  S/P CABG x 3  S/P cholecystectomy  No significant past surgical history      SOCIAL HISTORY  Smoking - Denied, EtOH - Denied, Drugs - Denied    FUNCTIONAL HISTORY:   Lives with spouse, + stairs   Independent PTa    CURRENT FUNCTIONAL STATUS: min/mod a       FAMILY HISTORY   No pertinent family history in first degree relatives      RECENT LABS/IMAGING  CBC Full  -  ( 07 May 2019 04:40 )  WBC Count : 7.25 K/uL  RBC Count : 4.09 M/uL  Hemoglobin : 10.8 g/dL  Hematocrit : 34.5 %  Platelet Count - Automated : 148 K/uL  Mean Cell Volume : 84.4 fL  Mean Cell Hemoglobin : 26.4 pg  Mean Cell Hemoglobin Concentration : 31.3 %  Auto Neutrophil # : x  Auto Lymphocyte # : x  Auto Monocyte # : x  Auto Eosinophil # : x  Auto Basophil # : x  Auto Neutrophil % : x  Auto Lymphocyte % : x  Auto Monocyte % : x  Auto Eosinophil % : x  Auto Basophil % : x    05-07    137  |  103  |  21  ----------------------------<  392<H>  4.6   |  24  |  1.36<H>    Ca    8.6      07 May 2019 04:40  Phos  3.5     05-07  Mg     2.0     05-07          VITALS  T(C): 36.8 (05-07-19 @ 11:30), Max: 37.1 (05-07-19 @ 05:35)  HR: 89 (05-07-19 @ 11:30) (63 - 89)  BP: 161/83 (05-07-19 @ 11:30) (161/83 - 206/70)  RR: 18 (05-07-19 @ 11:30) (17 - 18)  SpO2: 99% (05-07-19 @ 11:30) (97% - 99%)  Wt(kg): --    ALLERGIES  No Known Allergies      MEDICATIONS   aspirin enteric coated 325 milliGRAM(s) Oral daily  atorvastatin 80 milliGRAM(s) Oral at bedtime  dextrose 40% Gel 15 Gram(s) Oral once PRN  dextrose 5%. 1000 milliLiter(s) IV Continuous <Continuous>  dextrose 50% Injectable 12.5 Gram(s) IV Push once  dextrose 50% Injectable 25 Gram(s) IV Push once  dextrose 50% Injectable 25 Gram(s) IV Push once  docusate sodium 100 milliGRAM(s) Oral daily  finasteride 5 milliGRAM(s) Oral daily  glucagon  Injectable 1 milliGRAM(s) IntraMuscular once PRN  heparin  Injectable 5000 Unit(s) SubCutaneous every 8 hours  insulin glargine Injectable (LANTUS) 6 Unit(s) SubCutaneous at bedtime  insulin lispro (HumaLOG) corrective regimen sliding scale   SubCutaneous three times a day before meals  insulin lispro (HumaLOG) corrective regimen sliding scale   SubCutaneous at bedtime  insulin lispro Injectable (HumaLOG) 2 Unit(s) SubCutaneous three times a day with meals  polyethylene glycol 3350 17 Gram(s) Oral daily  senna 2 Tablet(s) Oral at bedtime  tamsulosin 0.4 milliGRAM(s) Oral at bedtime      ----------------------------------------------------------------------------------------  PHYSICAL EXAM  Constitutional - NAD, Comfortable  HEENT - NCAT, EOMI  Neck - Supple, No limited ROM  Chest - CTA bilaterally, No wheeze, No rhonchi, No crackles  Cardiovascular - RRR, S1S2, No murmurs  Abdomen - BS+, Soft, NTND  Extremities - No C/C/E, No calf tenderness   Neurologic Exam -                    Cognitive - Awake, Alert, AAO to self, place, date, year, situation     Communication+dysarthria, no aphasia     Cranial Nerves -left facial droop      Motor - L hemiparesis                        Sensory - Intact to LT     Reflexes - DTR Intact, No primitive reflexive     Balance - WNL Static  Psychiatric - Mood stable, Affect WNL HPI:  85 yo male PMhx DM2, HTN, HLD, CAD, s/p CABG x3 p/w generalized weakness and slurred speech yesterday at 10pm. Pt poor historian, wife at bedside gave most of history. Pt's last seen normal yesterday morning @ 8am where pt is fully functional, ambulates freely without any aids. Pt had dinner at around 8pm. At 10 pm pt developed sudden generalized weakness, bilateral blurred vision and slurred speech. Pt checked his BFS 65. EMS was called, BFS by EMS was 293. Pt didn't eat anything after 1st BFS. Pt admits SOB at that time and polyuria. Pt denies chest pain, palpitations, facial palsy, headache, nausea, vomiting or abdominal pain.    In ED pt was given IV NS x1L.    On admission, pt found to have L facial droop and L pronator drift. (05 May 2019 11:58)  MRI_  MPRESSION:    Acute right pontine infarct.    Right internal carotid artery absent flow void suspicious for decreased   flow or thrombosis. CT or MR angiography of the head and neck is advised   for further evaluation.    swallow eval-> mechanical soft with nectar thick     REVIEW OF SYSTEMS: No chest pain, shortness of breath, nausea, vomiting or diarhea.      PAST MEDICAL & SURGICAL HISTORY  DM2 (diabetes mellitus, type 2)  Benign prostatic hyperplasia  3-vessel coronary artery disease  Pneumonia  HTN (hypertension)  DM (diabetes mellitus)  S/P CABG x 3  S/P cholecystectomy  No significant past surgical history      SOCIAL HISTORY  Smoking - Denied, EtOH - Denied, Drugs - Denied    FUNCTIONAL HISTORY:   Lives with spouse, + stairs   Independent PTa    CURRENT FUNCTIONAL STATUS: min/mod a       FAMILY HISTORY   No pertinent family history in first degree relatives      RECENT LABS/IMAGING  CBC Full  -  ( 07 May 2019 04:40 )  WBC Count : 7.25 K/uL  RBC Count : 4.09 M/uL  Hemoglobin : 10.8 g/dL  Hematocrit : 34.5 %  Platelet Count - Automated : 148 K/uL  Mean Cell Volume : 84.4 fL  Mean Cell Hemoglobin : 26.4 pg  Mean Cell Hemoglobin Concentration : 31.3 %  Auto Neutrophil # : x  Auto Lymphocyte # : x  Auto Monocyte # : x  Auto Eosinophil # : x  Auto Basophil # : x  Auto Neutrophil % : x  Auto Lymphocyte % : x  Auto Monocyte % : x  Auto Eosinophil % : x  Auto Basophil % : x    05-07    137  |  103  |  21  ----------------------------<  392<H>  4.6   |  24  |  1.36<H>    Ca    8.6      07 May 2019 04:40  Phos  3.5     05-07  Mg     2.0     05-07          VITALS  T(C): 36.8 (05-07-19 @ 11:30), Max: 37.1 (05-07-19 @ 05:35)  HR: 89 (05-07-19 @ 11:30) (63 - 89)  BP: 161/83 (05-07-19 @ 11:30) (161/83 - 206/70)  RR: 18 (05-07-19 @ 11:30) (17 - 18)  SpO2: 99% (05-07-19 @ 11:30) (97% - 99%)  Wt(kg): --    ALLERGIES  No Known Allergies      MEDICATIONS   aspirin enteric coated 325 milliGRAM(s) Oral daily  atorvastatin 80 milliGRAM(s) Oral at bedtime  dextrose 40% Gel 15 Gram(s) Oral once PRN  dextrose 5%. 1000 milliLiter(s) IV Continuous <Continuous>  dextrose 50% Injectable 12.5 Gram(s) IV Push once  dextrose 50% Injectable 25 Gram(s) IV Push once  dextrose 50% Injectable 25 Gram(s) IV Push once  docusate sodium 100 milliGRAM(s) Oral daily  finasteride 5 milliGRAM(s) Oral daily  glucagon  Injectable 1 milliGRAM(s) IntraMuscular once PRN  heparin  Injectable 5000 Unit(s) SubCutaneous every 8 hours  insulin glargine Injectable (LANTUS) 6 Unit(s) SubCutaneous at bedtime  insulin lispro (HumaLOG) corrective regimen sliding scale   SubCutaneous three times a day before meals  insulin lispro (HumaLOG) corrective regimen sliding scale   SubCutaneous at bedtime  insulin lispro Injectable (HumaLOG) 2 Unit(s) SubCutaneous three times a day with meals  polyethylene glycol 3350 17 Gram(s) Oral daily  senna 2 Tablet(s) Oral at bedtime  tamsulosin 0.4 milliGRAM(s) Oral at bedtime      ----------------------------------------------------------------------------------------  PHYSICAL EXAM  Constitutional - NAD, Comfortable  HEENT - NCAT, EOMI  Neck - Supple, No limited ROM  Chest - CTA bilaterally, No wheeze, No rhonchi, No crackles  Cardiovascular - RRR, S1S2, No murmurs  Abdomen - BS+, Soft, NTND  Extremities - No C/C/E, No calf tenderness   Neurologic Exam -                    Cognitive - Awake, Alert, AAO to self, place, date, year, situation     Communication+dysarthria, no aphasia     Cranial Nerves -left facial droop      Motor - L hemiparesis 4/5 LUE/LLE                        Sensory - Intact to LT     Reflexes - DTR Intact, No primitive reflexive     Balance - poor   Psychiatric - Mood stable, Affect WNL

## 2019-05-07 NOTE — PROGRESS NOTE ADULT - SUBJECTIVE AND OBJECTIVE BOX
CC: no acute events    TELEMETRY:     PHYSICAL EXAM:    T(C): 36.8 (05-07-19 @ 11:30), Max: 37.1 (05-07-19 @ 05:35)  HR: 89 (05-07-19 @ 11:30) (63 - 89)  BP: 161/83 (05-07-19 @ 11:30) (161/83 - 206/70)  RR: 18 (05-07-19 @ 11:30) (17 - 18)  SpO2: 99% (05-07-19 @ 11:30) (97% - 99%)  Wt(kg): --  I&O's Summary    06 May 2019 07:01  -  07 May 2019 07:00  --------------------------------------------------------  IN: 200 mL / OUT: 400 mL / NET: -200 mL        Appearance: Normal	  Cardiovascular: Normal S1 S2,RRR, No JVD, No murmurs  Respiratory: Lungs clear to auscultation	  Gastrointestinal:  Soft, Non-tender, + BS	  Extremities: Normal range of motion, No clubbing, cyanosis or edema  Vascular: Peripheral pulses palpable 2+ bilaterally     LABS:	 	                          10.8   7.25  )-----------( 148      ( 07 May 2019 04:40 )             34.5     05-07    137  |  103  |  21  ----------------------------<  392<H>  4.6   |  24  |  1.36<H>    Ca    8.6      07 May 2019 04:40  Phos  3.5     05-07  Mg     2.0     05-07            CARDIAC MARKERS:

## 2019-05-07 NOTE — PROGRESS NOTE ADULT - SUBJECTIVE AND OBJECTIVE BOX
Patient is a 84y old  Male who presents with a chief complaint of Stroke (07 May 2019 15:39)      SUBJECTIVE / OVERNIGHT EVENTS:    Events noted.  CONSTITUTIONAL: No fever,  or fatigue  RESPIRATORY: No cough, wheezing,  No shortness of breath  CARDIOVASCULAR: No chest pain, palpitations, dizziness, or leg swelling  GASTROINTESTINAL: No abdominal or epigastric pain.   NEUROLOGICAL: No headaches,     MEDICATIONS  (STANDING):  aspirin enteric coated 325 milliGRAM(s) Oral daily  atorvastatin 80 milliGRAM(s) Oral at bedtime  dextrose 5%. 1000 milliLiter(s) (50 mL/Hr) IV Continuous <Continuous>  dextrose 50% Injectable 12.5 Gram(s) IV Push once  dextrose 50% Injectable 25 Gram(s) IV Push once  dextrose 50% Injectable 25 Gram(s) IV Push once  docusate sodium 100 milliGRAM(s) Oral daily  finasteride 5 milliGRAM(s) Oral daily  heparin  Injectable 5000 Unit(s) SubCutaneous every 8 hours  insulin glargine Injectable (LANTUS) 6 Unit(s) SubCutaneous at bedtime  insulin lispro (HumaLOG) corrective regimen sliding scale   SubCutaneous three times a day before meals  insulin lispro (HumaLOG) corrective regimen sliding scale   SubCutaneous at bedtime  insulin lispro Injectable (HumaLOG) 2 Unit(s) SubCutaneous three times a day with meals  polyethylene glycol 3350 17 Gram(s) Oral daily  senna 2 Tablet(s) Oral at bedtime  tamsulosin 0.4 milliGRAM(s) Oral at bedtime    MEDICATIONS  (PRN):  dextrose 40% Gel 15 Gram(s) Oral once PRN Blood Glucose LESS THAN 70 milliGRAM(s)/deciliter  glucagon  Injectable 1 milliGRAM(s) IntraMuscular once PRN Glucose LESS THAN 70 milligrams/deciliter        CAPILLARY BLOOD GLUCOSE      POCT Blood Glucose.: 172 mg/dL (07 May 2019 21:18)  POCT Blood Glucose.: 152 mg/dL (07 May 2019 17:37)  POCT Blood Glucose.: 131 mg/dL (07 May 2019 12:20)  POCT Blood Glucose.: 240 mg/dL (07 May 2019 08:54)    I&O's Summary    06 May 2019 07:01  -  07 May 2019 07:00  --------------------------------------------------------  IN: 200 mL / OUT: 400 mL / NET: -200 mL    07 May 2019 07:01  -  07 May 2019 23:18  --------------------------------------------------------  IN: 0 mL / OUT: 400 mL / NET: -400 mL        PHYSICAL EXAM:  GENERAL: NAD  NECK: Supple, No JVD  CHEST/LUNG: Clear to auscultation bilaterally; No wheezing.  HEART: Regular rate and rhythm; No murmurs, rubs, or gallops  ABDOMEN: Soft, Nontender, Nondistended; Bowel sounds present  EXTREMITIES:   No edema  NEUROLOGY: AAO X 3      LABS:                        10.8   7.25  )-----------( 148      ( 07 May 2019 04:40 )             34.5     05-07    137  |  103  |  21  ----------------------------<  392<H>  4.6   |  24  |  1.36<H>    Ca    8.6      07 May 2019 04:40  Phos  3.5     05-07  Mg     2.0     05-07              CAPILLARY BLOOD GLUCOSE      POCT Blood Glucose.: 172 mg/dL (07 May 2019 21:18)  POCT Blood Glucose.: 152 mg/dL (07 May 2019 17:37)  POCT Blood Glucose.: 131 mg/dL (07 May 2019 12:20)  POCT Blood Glucose.: 240 mg/dL (07 May 2019 08:54)    05-05 @ 05:37  Culture-urine   NO GROWTH AT 24 HOURS  Culture results --  method type --  Organism --  Organism Identification --  Specimen source URINE MIDSTREAM           05-05 @ 05:37  Culture blood --  Culture results --  Gram stain --  Gram stain blood --  Method type --  Organism --  Organism identification --  Specimen source URINE MIDSTREAM      RADIOLOGY & ADDITIONAL TESTS:    Imaging Personally Reviewed:    Consultant(s) Notes Reviewed:      Care Discussed with Consultants/Other Providers:

## 2019-05-07 NOTE — CONSULT NOTE ADULT - SUBJECTIVE AND OBJECTIVE BOX
[facility] NEPHROLOGY- CONSULTATION NOTE    84y Male with history of below presents with acute CVA. Nephrology consulted for elevated Scr.    Patient poor historian and [facility] and information obtained from chart. Patient appears to have a h/o CKD-3 with proteinuria with baseline Scr 1.5 as per prior records. Patient with h/o HTN, DM and CHF. Patient on lasix 20 mg daily and lisinopril 10 mg daily as an outpatient both of which have been held on admission with Scr improving and currently below baseline.    REVIEW OF SYSTEMS:  Gen: no changes in weight  HEENT: no rhinorrhea  Neck: no sore throat  Cards: no chest pain  Resp: no dyspnea  GI: no nausea or vomiting or diarrhea  : no dysuria or hematuria  Vascular: no LE edema  Derm: no rashes  Neuro: no numbness/tingling    No Known Allergies      Home Medications Reviewed  Hospital Medications:   MEDICATIONS  (STANDING):  aspirin enteric coated 325 milliGRAM(s) Oral daily  atorvastatin 80 milliGRAM(s) Oral at bedtime  dextrose 5%. 1000 milliLiter(s) (50 mL/Hr) IV Continuous <Continuous>  dextrose 50% Injectable 12.5 Gram(s) IV Push once  dextrose 50% Injectable 25 Gram(s) IV Push once  dextrose 50% Injectable 25 Gram(s) IV Push once  docusate sodium 100 milliGRAM(s) Oral daily  finasteride 5 milliGRAM(s) Oral daily  heparin  Injectable 5000 Unit(s) SubCutaneous every 8 hours  insulin glargine Injectable (LANTUS) 4 Unit(s) SubCutaneous at bedtime  insulin lispro (HumaLOG) corrective regimen sliding scale   SubCutaneous three times a day before meals  insulin lispro (HumaLOG) corrective regimen sliding scale   SubCutaneous at bedtime  insulin lispro Injectable (HumaLOG) 3 Unit(s) SubCutaneous three times a day with meals  polyethylene glycol 3350 17 Gram(s) Oral daily  senna 2 Tablet(s) Oral at bedtime  tamsulosin 0.4 milliGRAM(s) Oral at bedtime      PAST MEDICAL & SURGICAL HISTORY:  DM2 (diabetes mellitus, type 2)  Benign prostatic hyperplasia  3-vessel coronary artery disease  HTN (hypertension)  S/P CABG x 3  S/P cholecystectomy      FAMILY HISTORY:  No pertinent family history in first degree relatives      SOCIAL HISTORY:  Denies toxic substance use     VITALS:  T(F): 98.7 (05-07-19 @ 05:35), Max: 98.7 (05-07-19 @ 05:35)  HR: 71 (05-07-19 @ 05:35)  BP: 179/86 (05-07-19 @ 05:35)  RR: 17 (05-07-19 @ 05:35)  SpO2: 99% (05-07-19 @ 05:35)  Wt(kg): --    05-06 @ 07:01  -  05-07 @ 07:00  --------------------------------------------------------  IN: 200 mL / OUT: 400 mL / NET: -200 mL          PHYSICAL EXAM:  Gen: NAD, calm  HEENT: MMM  Neck: no JVD  Cards: RRR, +S1/S2, + FERNANDEZ  Resp: CTA B/L  GI: soft, NT/ND, NABS  : no CVA tenderness  Vascular: no LE edema B/L  Derm: no rashes  Neuro: non-focal    LABS:  05-07    137  |  103  |  21  ----------------------------<  392<H>  4.6   |  24  |  1.36<H>    Ca    8.6      07 May 2019 04:40  Phos  3.5     05-07  Mg     2.0     05-07      Creatinine Trend: 1.36 <--, 1.44 <--, 1.78 <--                        10.8   7.25  )-----------( 148      ( 07 May 2019 04:40 )             34.5     Urine Studies:  Urinalysis Basic - ( 05 May 2019 05:20 )    Color: YELLOW / Appearance: CLEAR / SG: > 1.040 / pH: 6.5  Gluc: 500 / Ketone: NEGATIVE  / Bili: NEGATIVE / Urobili: NORMAL   Blood: NEGATIVE / Protein: >300 / Nitrite: NEGATIVE   Leuk Esterase: NEGATIVE / RBC: 0-2 / WBC 0-2   Sq Epi:  / Non Sq Epi: FEW / Bacteria: FEW          RADIOLOGY & ADDITIONAL STUDIES:  < from: MR Angio Neck w/ IV Cont (05.06.19 @ 18:03) >  IMPRESSION:    High-grade stenoses involve the intracranial and extracranial segments of   the right internal carotid artery. Other vascular stenoses are noted with   distribution as described.    < end of copied text >      < from: MR Head No Cont (05.06.19 @ 09:44) >  IMPRESSION:    Acute right pontine infarct.    Right internal carotid artery absent flow void suspicious for decreased   flow or thrombosis. CT or MR angiography of the head and neck is advised   for further evaluation.    Findings were discussed with dede Larios on 5/6/2019 10:35   AM with read back.    < end of copied text >      < from: Xray Chest 1 View AP/PA (05.05.19 @ 05:48) >  IMPRESSION:  No focal consolidation.    < end of copied text >

## 2019-05-07 NOTE — PROGRESS NOTE ADULT - ASSESSMENT
85yo RH  man with a PMHx of DM, HTN, HLD, CAD s/p CABGx3 presents with his wife with complaints of L. weakness and slurred speech. Exam showed a left sided hemiparesis with facial droop, MRI showed a right pontine infarct. MRA showed a right carotid absent flow, not correlating with the current stroke.  Impression: Right pontine stroke, small vessel disease is the most likely etiology    Plan:  [] CTA H&N to better assess the degree of carotid stenosis  [] c/w ASA/plavix for 3 m  [] c/w statin 80 mg  [] aggressive risk factor control  [] gradual normotensive with monitoring of the symptoms  [] PT OT  [] TTE can be done as an outpatient  [] f/w with marli maloney stroke NP  [] please call with any questions 84 years old man with multiple vascular factors including age, HDL, DM II, HPLD and CAD is evaluated at [facility] for acute onset of left-sided weakness and dysarthria; last known well time 9 PM on 5/4. MRI brain showed acute infarct in the right paramedian ubaldo and mild WMH of presumed vascular origin. MRA head and neck was concerning for intracranial and extracranial right ICA stenoses. CTA head and neck performed subsequently showed diffuse intracranial and extracranial atherosclerotic stenoses including severe stenosis of proximal right ICA with concurrent critical stenosis versus occlusion of proximal supraclinoid ICA with distal reconstitution of probably through PCOM and moderate-to-severe stenosis of intracranial right vertebral artery.     Impression:  Cerebral embolism/thrombosis with cerebral infarction. Right paramedian pontine stroke - likely etiology being large vessel disease i.e. symptomatic intracranial (right vertebral artery) atherosclerotic stenosis leading to artery to artery embolism versus branch atheromatous disease/small vessel disease     Plan:  - Aspirin (81 mg once a day) and clopidogrel (75 mg once a day) for aggressive secondary stroke prevention for 3 months (considering ambiguity of the mechanism of of his stroke with concerns for symptomatic intracranial atherosclerosis and clinical data suggestive of benefit of short duration dural antiplatelet therapy for 3 months in the patient with minor stroke) followed by aspirin 81 mg once a day  - Agree to continue with pharmacological DVT prophylaxis   - Atorvastatin 80 mg at bedtime considering admission LDL being 162 and likely atheroembolic etiology of his stroke  - HbA1C 11.6, consider aggressive vascular risk factors modifications including aggressive glycemic control  - Permissive HTN up to 220/110 mmHg for first 48-72 hours from symptom onset followed by gradual normotension  - TTE with bubble study and continue with telemetry to screen for possible cardiac source of embolism  - Prolonged cardiac monitoring with 30 days event monitor to screen for occult cardiac arrhythmia like atrial fibrillation being the cause of possible cardiac source of embolism to be considered as outpatient  - Would also discuss with his wife regarding possibility of being enrolled into stroke-AF clinical trial  - PT/OT/Speech and swallow/safety eval  - Stroke education    Above mentioned plan was discussed with patient in detail. All the questions were answered and concerns were addressed.

## 2019-05-07 NOTE — PROGRESS NOTE ADULT - PROBLEM SELECTOR PLAN 1
target bg 100-180 mg/dl, patient was below target range last night with hypoglycemia. unclear why.  patient does not remember what he had for dinner last night  would change lantus to 6 units sq qhs  decrease humalog to 2 units sq tid ac  c/w low humalog correction scale for both ac and hs    dc planning- Basal oral vs basal bolus. depending on inpatient requirements.  Pt was on lantus at home.  Would optimize this dose.  Full dc plan TBD target bg 100-180 mg/dl, patient was below target range last night with hypoglycemia. unclear why.  patient does not remember what he had for dinner last night. lantus dose reduced last night with subsequent Am hyperglycemia today.    would change lantus to 6 units sq qhs  decrease humalog to 2 units sq tid ac  c/w low humalog correction scale for both ac and hs    dc planning- Basal oral vs basal bolus. depending on inpatient requirements.  Pt was on lantus at home.  Would optimize this dose.  Full dc plan TBD

## 2019-05-07 NOTE — CONSULT NOTE ADULT - ASSESSMENT
84y Male with history of HTN, DM presents with acute CVA. Nephrology consulted for elevated Scr.    1) TAINA; likely hemodynamically mediated in setting of diuretic therapy and ACE-I now resolved. No further work up needed. Avoid nephrotoxins.    2) CKD-3: With significant proteinuria on UA secondary to DM. Patient advised to f/u as an outpatient for CKD work up including renal US. Monitor electrolytes.    3) HTN with CKD: BP uncontrolled. Goal BP as per neuro given recent CVA and R carotid artery stenosis. No objection to restart lisinopril and titrate as needed. Monitor BP.    3) LE edema: Patient appears euvolemic with mod-severe MR on recent TTE and normal LVSF. Holding lasix 20 mg daily for now. Monitor UO. 84y Male with history of HTN, DM presents with acute CVA. Nephrology consulted for elevated Scr.    1) TAINA; likely hemodynamically mediated in setting of diuretic therapy and ACE-I now resolved. No further work up needed. Avoid nephrotoxins.    2) CKD-3: With significant proteinuria on UA secondary to DM. Baseline Scr 1.5 as per prior records. Scr currently below baseline. Patient advised to f/u as an outpatient for CKD work up including renal US. Monitor electrolytes.    3) HTN with CKD: BP uncontrolled. Goal BP as per neuro given recent CVA and R carotid artery stenosis. No objection to restart lisinopril and titrate as needed. Monitor BP.    3) LE edema: Patient appears euvolemic with mod-severe MR on recent TTE and normal LVSF. Holding lasix 20 mg daily for now. Monitor UO.

## 2019-05-07 NOTE — PROGRESS NOTE ADULT - SUBJECTIVE AND OBJECTIVE BOX
S: NO acute complains    O:  Vital Signs Last 24 Hrs  T(C): 37.1 (07 May 2019 05:35), Max: 37.1 (07 May 2019 05:35)  T(F): 98.7 (07 May 2019 05:35), Max: 98.7 (07 May 2019 05:35)  HR: 71 (07 May 2019 05:35) (63 - 75)  BP: 179/86 (07 May 2019 05:35) (169/79 - 206/70)  BP(mean): --  RR: 17 (07 May 2019 05:35) (17 - 18)  SpO2: 99% (07 May 2019 05:35) (97% - 100%)    Exam:  - Mental Status:  Alert, awake, oriented to person, place, and time; Speech is notably dysarthric, fluent with intact naming, repetition, and comprehension; Good overall fund of knowledge;  - Cranial Nerves II-XII:    II:  Visual fields are full to confrontation; Pupils are equal, round, and reactive to light;  III, IV, VI:  Extraocular movements are intact without nystagmus.  V:  Facial sensation is intact in the V1-V3 distribution bilaterally.  VII:  Left UMN lower facial droop  VIII:  Hearing is intact to finger rub.  IX, X:  Uvula is midline and soft palate rises symmetrically  XI:  Head turning and shoulder shrug are intact.  XII:  Tongue protudes in the midline.  - Motor: LUE drift >> LLE drift. Strength is 5/5 elsewhere. Normal muscle bulk and tone throughout.  - Reflexes:  2+ and symmetric at the biceps, triceps, brachioradialis, knees, and ankles.  Plantar responses flexor.  - Sensory:  Intact throughout to vibration, and joint-position, light touch, pin prick.  - Coordination:  Finger-nose-finger and heel-knee-shin intact without dysmetria.  Rapid alternating hand movements intact.    < from: MR Head No Cont (05.06.19 @ 09:44) >  IMPRESSION:    Acute right pontine infarct.    Right internal carotid artery absent flow void suspicious for decreased   flow or thrombosis. CT or MR angiography of the head and neck is advised   for further evaluation.    < end of copied text > S: NO acute complains    ROS: All negative except documented above.    O:  Vital Signs Last 24 Hrs  T(C): 37.1 (07 May 2019 05:35), Max: 37.1 (07 May 2019 05:35)  T(F): 98.7 (07 May 2019 05:35), Max: 98.7 (07 May 2019 05:35)  HR: 71 (07 May 2019 05:35) (63 - 75)  BP: 179/86 (07 May 2019 05:35) (169/79 - 206/70)  BP(mean): --  RR: 17 (07 May 2019 05:35) (17 - 18)  SpO2: 99% (07 May 2019 05:35) (97% - 100%)    Exam:  - Mental Status:  Alert, awake, oriented to person, place, and time; Speech is notably dysarthric, fluent with intact naming, repetition, and comprehension; Good overall fund of knowledge;  - Cranial Nerves II-XII:    II:  Visual fields are full to confrontation; Pupils are equal, round, and reactive to light;  III, IV, VI:  Extraocular movements are intact without nystagmus.  V:  Facial sensation is intact in the V1-V3 distribution bilaterally.  VII:  Left UMN lower facial droop  VIII:  Hearing is intact to finger rub.  IX, X:  Uvula is midline and soft palate rises symmetrically  XI:  Head turning and shoulder shrug are intact.  XII:  Tongue protudes in the midline.  - Motor: LUE drift >> LLE drift. Strength is 5/5 elsewhere. Normal muscle bulk and tone throughout.  - Reflexes:  2+ and symmetric at the biceps, triceps, brachioradialis, knees, and ankles.  Plantar responses flexor.  - Sensory:  Intact throughout to vibration, and joint-position, light touch, pin prick.  - Coordination:  Finger-nose-finger and heel-knee-shin intact without dysmetria.  Rapid alternating hand movements intact.    < from: MR Head No Cont (05.06.19 @ 09:44) >  IMPRESSION:    Acute right pontine infarct.    Right internal carotid artery absent flow void suspicious for decreased   flow or thrombosis. CT or MR angiography of the head and neck is advised   for further evaluation.    < end of copied text >

## 2019-05-07 NOTE — PROGRESS NOTE ADULT - ASSESSMENT
· Assessment	  84 year old male with htn, CAD s/p CABG, post op Afib, DM, dementia presenting with COOK, chronic diastolic chf p/w atypical CP/weakness and evidence of poorly controlled diabetes    Uncontrolled DM II:  FSSS  Endo f/up  Lantus/Lispro    Acute Rt pontine infarct:  MRI brain reviewed.  Neuro f/up noted.  CTA head/Neck: Multiple extracranial and intracranial stenosis.    A Fib/CAD:  Tele  Cardio f/up noted.    HTN:  Cont current BP meds

## 2019-05-07 NOTE — PROGRESS NOTE ADULT - SUBJECTIVE AND OBJECTIVE BOX
Chief Complaint: t2dm    History:  no n/v, tolerates po, no hypoglycemia,    MEDICATIONS  (STANDING):  aspirin enteric coated 325 milliGRAM(s) Oral daily  atorvastatin 80 milliGRAM(s) Oral at bedtimeoglycemia   dextrose 5%. 1000 milliLiter(s) (50 mL/Hr) IV Continuous <Continuous>  dextrose 50% Injectable 12.5 Gram(s) IV Push once  dextrose 50% Injectable 25 Gram(s) IV Push once  dextrose 50% Injectable 25 Gram(s) IV Push once  docusate sodium 100 milliGRAM(s) Oral daily  finasteride 5 milliGRAM(s) Oral daily  heparin  Injectable 5000 Unit(s) SubCutaneous every 8 hours  insulin glargine Injectable (LANTUS) 8 Unit(s) SubCutaneous at bedtime  insulin lispro (HumaLOG) corrective regimen sliding scale   SubCutaneous three times a day before meals  insulin lispro (HumaLOG) corrective regimen sliding scale   SubCutaneous at bedtime  insulin lispro Injectable (HumaLOG) 3 Unit(s) SubCutaneous three times a day with meals  polyethylene glycol 3350 17 Gram(s) Oral daily  senna 2 Tablet(s) Oral at bedtime  tamsulosin 0.4 milliGRAM(s) Oral at bedtime    MEDICATIONS  (PRN):  dextrose 40% Gel 15 Gram(s) Oral once PRN Blood Glucose LESS THAN 70 milliGRAM(s)/deciliter  glucagon  Injectable 1 milliGRAM(s) IntraMuscular once PRN Glucose LESS THAN 70 milligrams/deciliter      Allergies    No Known Allergies    Intolerances      Review of Systems:  Constitutional: No fever  Eyes: No blurry vision    ALL OTHER SYSTEMS REVIEWED AND NEGATIVE      Vital Signs Last 24 Hrs  T(C): 36.8 (07 May 2019 11:30), Max: 37.1 (07 May 2019 05:35)  T(F): 98.3 (07 May 2019 11:30), Max: 98.7 (07 May 2019 05:35)  HR: 89 (07 May 2019 11:30) (63 - 89)  BP: 161/83 (07 May 2019 11:30) (161/83 - 206/70)  BP(mean): --  RR: 18 (07 May 2019 11:30) (17 - 18)  SpO2: 99% (07 May 2019 11:30) (97% - 99%)  GENERAL: NAD,  well-developed  GI: Soft, nontender, non distended  SKIN: Dry, intact, No rashes or lesions  PSYCH: Alert and oriented x 3, normal affect, normal mood      CAPILLARY BLOOD GLUCOSE    POCT Blood Glucose.: 131 mg/dL (07 May 2019 12:20)  POCT Blood Glucose.: 240 mg/dL (07 May 2019 08:54)  POCT Blood Glucose.: 121 mg/dL (06 May 2019 23:06)  POCT Blood Glucose.: 67 mg/dL (06 May 2019 22:52)  POCT Blood Glucose.: 58 mg/dL (06 May 2019 22:35)  POCT Blood Glucose.: 59 mg/dL (06 May 2019 22:33)  POCT Blood Glucose.: 147 mg/dL (06 May 2019 18:09)    POCT Blood Glucose.: 104 mg/dL (06 May 2019 13:35)  POCT Blood Glucose.: 159 mg/dL (05 May 2019 22:47)  POCT Blood Glucose.: 170 mg/dL (05 May 2019 21:01)  POCT Blood Glucose.: 117 mg/dL (05 May 2019 17:06)  POCT Blood Glucose.: 148 mg/dL (05 May 2019 15:23)      05-07    137  |  103  |  21  ----------------------------<  392<H>  4.6   |  24  |  1.36<H>    Ca    8.6      07 May 2019 04:40  Phos  3.5     05-07  Mg     2.0     05-07      Hemoglobin A1C, Whole Blood: 11.6 % <H> [4.0 - 5.6] (05-05-19 @ 07:35)

## 2019-05-07 NOTE — PROGRESS NOTE ADULT - ASSESSMENT
84 year old male with htn, CAD s/p CABG, post op Afib, DM, dementia presenting with COOK, chronic diastolic chf p/w atypical CP/weakness, slurred speech,  and evidence of poorly controlled diabetes    1. Acute CVA  -in setting of uncontrolled dm, htn  -neuro f/u  -asa  -MRA w high grade stenosis of MARKO, awaiting CTA for better assessment per neuro    2. Acute/chronic diastolic CHF  -volume status stable  -hold lasix for now  -eventual restart continue bb, acei   -echo with normal LV function, EF 66%, mod - severe MR     3. Mod-severe MR  -stable  -continue medical management     4.  CAD s/p CABG  -stable   -continue statin     5 Afib, hx    no further reoccurrence,  remains in SR   continue ASA     6. HTN   permissive htn per neuro     7. ckd  renal eval called          dvt ppx

## 2019-05-08 LAB
ANION GAP SERPL CALC-SCNC: 11 MMO/L — SIGNIFICANT CHANGE UP (ref 7–14)
BUN SERPL-MCNC: 19 MG/DL — SIGNIFICANT CHANGE UP (ref 7–23)
CALCIUM SERPL-MCNC: 8.5 MG/DL — SIGNIFICANT CHANGE UP (ref 8.4–10.5)
CHLORIDE SERPL-SCNC: 105 MMOL/L — SIGNIFICANT CHANGE UP (ref 98–107)
CO2 SERPL-SCNC: 23 MMOL/L — SIGNIFICANT CHANGE UP (ref 22–31)
CREAT SERPL-MCNC: 1.51 MG/DL — HIGH (ref 0.5–1.3)
GLUCOSE SERPL-MCNC: 94 MG/DL — SIGNIFICANT CHANGE UP (ref 70–99)
HCT VFR BLD CALC: 32.7 % — LOW (ref 39–50)
HGB BLD-MCNC: 10.2 G/DL — LOW (ref 13–17)
MCHC RBC-ENTMCNC: 26.5 PG — LOW (ref 27–34)
MCHC RBC-ENTMCNC: 31.2 % — LOW (ref 32–36)
MCV RBC AUTO: 84.9 FL — SIGNIFICANT CHANGE UP (ref 80–100)
NRBC # FLD: 0 K/UL — SIGNIFICANT CHANGE UP (ref 0–0)
PLATELET # BLD AUTO: 153 K/UL — SIGNIFICANT CHANGE UP (ref 150–400)
PMV BLD: 10.7 FL — SIGNIFICANT CHANGE UP (ref 7–13)
POTASSIUM SERPL-MCNC: 4.2 MMOL/L — SIGNIFICANT CHANGE UP (ref 3.5–5.3)
POTASSIUM SERPL-SCNC: 4.2 MMOL/L — SIGNIFICANT CHANGE UP (ref 3.5–5.3)
RBC # BLD: 3.85 M/UL — LOW (ref 4.2–5.8)
RBC # FLD: 13.6 % — SIGNIFICANT CHANGE UP (ref 10.3–14.5)
SODIUM SERPL-SCNC: 139 MMOL/L — SIGNIFICANT CHANGE UP (ref 135–145)
WBC # BLD: 5.44 K/UL — SIGNIFICANT CHANGE UP (ref 3.8–10.5)
WBC # FLD AUTO: 5.44 K/UL — SIGNIFICANT CHANGE UP (ref 3.8–10.5)

## 2019-05-08 PROCEDURE — 99233 SBSQ HOSP IP/OBS HIGH 50: CPT | Mod: GC

## 2019-05-08 PROCEDURE — 99232 SBSQ HOSP IP/OBS MODERATE 35: CPT | Mod: GC

## 2019-05-08 PROCEDURE — 99232 SBSQ HOSP IP/OBS MODERATE 35: CPT

## 2019-05-08 PROCEDURE — 99223 1ST HOSP IP/OBS HIGH 75: CPT | Mod: GC

## 2019-05-08 RX ORDER — AMLODIPINE BESYLATE 2.5 MG/1
5 TABLET ORAL DAILY
Qty: 0 | Refills: 0 | Status: DISCONTINUED | OUTPATIENT
Start: 2019-05-08 | End: 2019-05-13

## 2019-05-08 RX ORDER — ASPIRIN/CALCIUM CARB/MAGNESIUM 324 MG
81 TABLET ORAL DAILY
Qty: 0 | Refills: 0 | Status: DISCONTINUED | OUTPATIENT
Start: 2019-05-08 | End: 2019-05-13

## 2019-05-08 RX ORDER — CLOPIDOGREL BISULFATE 75 MG/1
75 TABLET, FILM COATED ORAL DAILY
Qty: 0 | Refills: 0 | Status: DISCONTINUED | OUTPATIENT
Start: 2019-05-08 | End: 2019-05-13

## 2019-05-08 RX ORDER — INSULIN GLARGINE 100 [IU]/ML
5 INJECTION, SOLUTION SUBCUTANEOUS AT BEDTIME
Qty: 0 | Refills: 0 | Status: DISCONTINUED | OUTPATIENT
Start: 2019-05-08 | End: 2019-05-12

## 2019-05-08 RX ADMIN — HEPARIN SODIUM 5000 UNIT(S): 5000 INJECTION INTRAVENOUS; SUBCUTANEOUS at 21:41

## 2019-05-08 RX ADMIN — INSULIN GLARGINE 5 UNIT(S): 100 INJECTION, SOLUTION SUBCUTANEOUS at 21:40

## 2019-05-08 RX ADMIN — SENNA PLUS 2 TABLET(S): 8.6 TABLET ORAL at 21:40

## 2019-05-08 RX ADMIN — Medication 2 UNIT(S): at 09:25

## 2019-05-08 RX ADMIN — POLYETHYLENE GLYCOL 3350 17 GRAM(S): 17 POWDER, FOR SOLUTION ORAL at 12:08

## 2019-05-08 RX ADMIN — TAMSULOSIN HYDROCHLORIDE 0.4 MILLIGRAM(S): 0.4 CAPSULE ORAL at 21:40

## 2019-05-08 RX ADMIN — FINASTERIDE 5 MILLIGRAM(S): 5 TABLET, FILM COATED ORAL at 12:08

## 2019-05-08 RX ADMIN — Medication 2 UNIT(S): at 12:45

## 2019-05-08 RX ADMIN — ATORVASTATIN CALCIUM 80 MILLIGRAM(S): 80 TABLET, FILM COATED ORAL at 21:40

## 2019-05-08 RX ADMIN — Medication 2 UNIT(S): at 17:50

## 2019-05-08 RX ADMIN — Medication 100 MILLIGRAM(S): at 12:08

## 2019-05-08 RX ADMIN — HEPARIN SODIUM 5000 UNIT(S): 5000 INJECTION INTRAVENOUS; SUBCUTANEOUS at 05:13

## 2019-05-08 RX ADMIN — CLOPIDOGREL BISULFATE 75 MILLIGRAM(S): 75 TABLET, FILM COATED ORAL at 12:07

## 2019-05-08 RX ADMIN — Medication 1: at 12:44

## 2019-05-08 RX ADMIN — AMLODIPINE BESYLATE 5 MILLIGRAM(S): 2.5 TABLET ORAL at 17:27

## 2019-05-08 RX ADMIN — Medication 81 MILLIGRAM(S): at 12:08

## 2019-05-08 RX ADMIN — HEPARIN SODIUM 5000 UNIT(S): 5000 INJECTION INTRAVENOUS; SUBCUTANEOUS at 14:26

## 2019-05-08 NOTE — PROGRESS NOTE ADULT - ASSESSMENT
83 yo man with uncontrolled Type 2 DM (A1C 11.6), HTN, HLD, CAD, s/p CABG x3 p/w generalized weakness and slurred speech. Admitted for CVA r/o. Endocrine consulted for uncontrolled Type 2 DM.

## 2019-05-08 NOTE — PROGRESS NOTE ADULT - SUBJECTIVE AND OBJECTIVE BOX
HPI:  85 yo male PMhx DM2, HTN, HLD, CAD, s/p CABG x3 p/w generalized weakness and slurred speech yesterday at 10pm. Pt poor historian, wife at bedside gave most of history. Pt's last seen normal yesterday morning @ 8am where pt is fully functional, ambulates freely without any aids. Pt had dinner at around 8pm. At 10 pm pt developed sudden generalized weakness, bilateral blurred vision and slurred speech. Pt checked his BFS 65. EMS was called, BFS by EMS was 293. Pt didn't eat anything after 1st BFS. Pt admits SOB at that time and polyuria. Pt denies chest pain, palpitations, facial palsy, headache, nausea, vomiting or abdominal pain.    In ED pt was given IV NS x1L.    On admission, pt found to have L facial droop and L pronator drift. (05 May 2019 11:58)  MRI_  MPRESSION:    Acute right pontine infarct.    Right internal carotid artery absent flow void suspicious for decreased   flow or thrombosis. CT or MR angiography of the head and neck is advised   for further evaluation.    swallow eval-> mechanical soft with nectar thick   participated with PT    REVIEW OF SYSTEMS: No chest pain, shortness of breath, nausea, vomiting or diarhea.        CURRENT FUNCTIONAL STATUS: min a     Vital Signs Last 24 Hrs  T(C): 36.9 (08 May 2019 12:06), Max: 36.9 (08 May 2019 12:06)  T(F): 98.4 (08 May 2019 12:06), Max: 98.4 (08 May 2019 12:06)  HR: 62 (08 May 2019 12:06) (62 - 75)  BP: 149/70 (08 May 2019 12:06) (143/73 - 164/79)  BP(mean): --  RR: 14 (08 May 2019 12:06) (14 - 18)  SpO2: 100% (08 May 2019 12:06) (98% - 100%)    ----------------------------------------------------------------------------------------  PHYSICAL EXAM  Constitutional - NAD, Comfortable  Extremities - No C/C/E, No calf tenderness   Neurologic Exam -                    Cognitive - Awake, Alert, AAO to self, place, date, year, situation     Communication+dysarthria, no aphasia     Cranial Nerves -left facial droop      Motor - L hemiparesis 4/5 LUE/LLE                        Sensory - Intact to LT     Reflexes - DTR Intact, No primitive reflexive     Balance - poor   Psychiatric - Mood stable, Affect WNL

## 2019-05-08 NOTE — CONSULT NOTE ADULT - PROBLEM SELECTOR RECOMMENDATION 9
-Patient with uncontrolled Type 2 DM (a1C 11.6)  -For inpatient can start lantus 10 units qhs (hypoglycemic often at home in AM as per pt on lantus 12 units ) Can also start meal time humalog 3 units TID with low dose humalog correctional scale  -For dc basal/bolus vs basal +/- metformin and possible DPP4  -Patient can establish care and f/u with endocrine at [address] [place] [address] [phone]
I performed a history and physical exam of the patient and discussed  the findings and plan with the house officer. I reviewed the resident note and agree with the findings and plan

## 2019-05-08 NOTE — PROGRESS NOTE ADULT - ASSESSMENT
· Assessment	  84 year old male with htn, CAD s/p CABG, post op Afib, DM, dementia presenting with COOK, chronic diastolic chf p/w atypical CP/weakness and evidence of poorly controlled diabetes    Uncontrolled DM II:  FSSS  Endo f/up  Lantus/Lispro    Acute Rt pontine infarct:  MRI brain reviewed.  Neuro f/up noted.  CTA head/Neck: Multiple extracranial and intracranial stenosis.    Rt ICA stenosis:  Vascular eval noted    A Fib/CAD:  Tele  Cardio f/up noted.    HTN:  Cont current BP meds

## 2019-05-08 NOTE — PROGRESS NOTE ADULT - SUBJECTIVE AND OBJECTIVE BOX
CARDIOLOGY FOLLOW UP - Dr. Villarreal    CC no cp or sob        PHYSICAL EXAM:  T(C): 36.9 (05-08-19 @ 12:06), Max: 36.9 (05-08-19 @ 12:06)  HR: 62 (05-08-19 @ 12:06) (62 - 75)  BP: 149/70 (05-08-19 @ 12:06) (143/73 - 164/79)  RR: 14 (05-08-19 @ 12:06) (14 - 18)  SpO2: 100% (05-08-19 @ 12:06) (98% - 100%)  Wt(kg): --  I&O's Summary    07 May 2019 07:01  -  08 May 2019 07:00  --------------------------------------------------------  IN: 0 mL / OUT: 400 mL / NET: -400 mL    08 May 2019 07:01  -  08 May 2019 15:02  --------------------------------------------------------  IN: 658 mL / OUT: 200 mL / NET: 458 mL        Appearance: Normal	  Cardiovascular: Normal S1 S2,RRR, + murmur  Respiratory: Lungs clear to auscultation	  Gastrointestinal:  Soft, Non-tender, + BS	  Extremities: Normal range of motion, No clubbing, cyanosis or edema        MEDICATIONS  (STANDING):  amLODIPine   Tablet 5 milliGRAM(s) Oral daily  aspirin enteric coated 81 milliGRAM(s) Oral daily  atorvastatin 80 milliGRAM(s) Oral at bedtime  clopidogrel Tablet 75 milliGRAM(s) Oral daily  dextrose 5%. 1000 milliLiter(s) (50 mL/Hr) IV Continuous <Continuous>  dextrose 50% Injectable 12.5 Gram(s) IV Push once  dextrose 50% Injectable 25 Gram(s) IV Push once  dextrose 50% Injectable 25 Gram(s) IV Push once  docusate sodium 100 milliGRAM(s) Oral daily  finasteride 5 milliGRAM(s) Oral daily  heparin  Injectable 5000 Unit(s) SubCutaneous every 8 hours  insulin glargine Injectable (LANTUS) 5 Unit(s) SubCutaneous at bedtime  insulin lispro (HumaLOG) corrective regimen sliding scale   SubCutaneous three times a day before meals  insulin lispro (HumaLOG) corrective regimen sliding scale   SubCutaneous at bedtime  insulin lispro Injectable (HumaLOG) 2 Unit(s) SubCutaneous three times a day with meals  polyethylene glycol 3350 17 Gram(s) Oral daily  senna 2 Tablet(s) Oral at bedtime  tamsulosin 0.4 milliGRAM(s) Oral at bedtime      TELEMETRY: NSR  	    ECG:  	  RADIOLOGY:   DIAGNOSTIC TESTING:  [ ] Echocardiogram:  [ ]  Catheterization:  [ ] Stress Test:    OTHER: 	    < from: CT Angio Neck w/ IV Cont (05.07.19 @ 15:18) >  EXAM:  CT ANGIO NECK (W)AW IC      EXAM:  CT ANGIO BRAIN (W)AW IC        PROCEDURE DATE:  May  7 2019       IMPRESSION:    Head CT:    A right pontine infarct is again noted without associated hemorrhagic   transformation.    CTA head and neck:    Multiple intracranial and extracranial stenoses are noted with   distribution as described.      < end of copied text >          LABS:	 	                                10.2   5.44  )-----------( 153      ( 08 May 2019 06:00 )             32.7     05-08    139  |  105  |  19  ----------------------------<  94  4.2   |  23  |  1.51<H>    Ca    8.5      08 May 2019 06:00  Phos  3.5     05-07  Mg     2.0     05-07

## 2019-05-08 NOTE — PROGRESS NOTE ADULT - ATTENDING COMMENTS
agree with above  CV stable  CTA results appreciated, cont medical mgmt  awaiting TTE w Bubble study  will consider outpt tele monitoring  DC planning to [facility] agree with above  CV stable  CTA results appreciated, will ask vasc to eval  awaiting TTE w Bubble study  will consider outpt tele monitoring

## 2019-05-08 NOTE — CONSULT NOTE ADULT - ASSESSMENT
84M PMH DM2, HTN, HLD, CAD, s/p CABG x3, initially presented 5/5/2019 with generalized weakness and slurred speech, found to have acute infarct in R paramedian ubaldo. Found to have R ICA stenosis on imaging.    - Patient with likely atheroembolic etiology of his stroke - would not recommend treating ICA stenosis surgically at this time  - Would recommend TTE. anticoagulation if embolic in etiology  - Recommend Neuro IR evaluation   - Will continue to follow  - Discussed with attending Dr. Sybil Foote PGY2  Vascular surgery  [id number] 84M PMH DM2, HTN, HLD, CAD, s/p CABG x3, initially presented 5/5/2019 with generalized weakness and slurred speech, found to have acute infarct in R paramedian ubaldo. Found to have R ICA stenosis on imaging.    -Rt ICA findings s/o thromboembolic   no indication for  vascular intervention   - Would recommend TTE. anticoagulation if embolic in etiology  - Recommend Neuro IR evaluation   - Will continue to follow  - Discussed with attending Dr. Sybil Foote PGY2  Vascular surgery  [id number]

## 2019-05-08 NOTE — PROGRESS NOTE ADULT - SUBJECTIVE AND OBJECTIVE BOX
S: No events overnight. No acute compliants.     ROS: All negative except documented above.    O:  Vital Signs Last 24 Hrs  T(C): 36.3 (08 May 2019 05:10), Max: 36.8 (07 May 2019 11:30)  T(F): 97.4 (08 May 2019 05:10), Max: 98.3 (07 May 2019 11:30)  HR: 75 (08 May 2019 05:10) (69 - 89)  BP: 164/79 (08 May 2019 05:10) (143/73 - 164/79)  BP(mean): --  RR: 16 (08 May 2019 05:10) (16 - 18)  SpO2: 100% (08 May 2019 05:10) (98% - 100%)    Exam:  - Mental Status:  Alert, awake, oriented to person, place, and time; Speech is notably dysarthric, fluent with intact naming, repetition, and comprehension; Good overall fund of knowledge;  - Cranial Nerves II-XII:    II:  Visual fields are full to confrontation; Pupils are equal, round, and reactive to light;  III, IV, VI:  Extraocular movements are intact without nystagmus.  V:  Facial sensation is intact in the V1-V3 distribution bilaterally.  VII:  Left UMN lower facial droop  VIII:  Hearing is intact to finger rub.  IX, X:  Uvula is midline and soft palate rises symmetrically  XI:  Head turning and shoulder shrug are intact.  XII:  Tongue protudes in the midline.  - Motor: LUE drift >> LLE drift. Strength is 5/5 elsewhere. Normal muscle bulk and tone throughout.  - Reflexes:  2+ and symmetric at the biceps, triceps, brachioradialis, knees, and ankles.  Plantar responses flexor.  - Sensory:  Intact throughout to vibration, and joint-position, light touch, pin prick.  - Coordination:  Finger-nose-finger and heel-knee-shin intact without dysmetria.  Rapid alternating hand movements intact.    < from: MR Head No Cont (05.06.19 @ 09:44) >  IMPRESSION:    Acute right pontine infarct.    Right internal carotid artery absent flow void suspicious for decreased   flow or thrombosis. CT or MR angiography of the head and neck is advised   for further evaluation.    < end of copied text >

## 2019-05-08 NOTE — PROGRESS NOTE ADULT - ATTENDING COMMENTS
[facility] NEPHROLOGY  Dorian Atkinson M.D.  Rome Castaneda D.O.  Annalise Cervantes M.D.  Lauren Arvizu, MSN, ANP-C    Telephone: [phone]  Facsimile: [phone]    [address] [place] [address]

## 2019-05-08 NOTE — PROGRESS NOTE ADULT - SUBJECTIVE AND OBJECTIVE BOX
Patient is a 84y old  Male who presents with a chief complaint of Stroke (08 May 2019 20:37)      SUBJECTIVE / OVERNIGHT EVENTS:    Events noted.  CONSTITUTIONAL: No fever,  or fatigue  RESPIRATORY: No cough, wheezing,  No shortness of breath  CARDIOVASCULAR: No chest pain, palpitations,   GASTROINTESTINAL: No abdominal or epigastric pain.   NEUROLOGICAL: No headaches,     MEDICATIONS  (STANDING):  amLODIPine   Tablet 5 milliGRAM(s) Oral daily  aspirin enteric coated 81 milliGRAM(s) Oral daily  atorvastatin 80 milliGRAM(s) Oral at bedtime  clopidogrel Tablet 75 milliGRAM(s) Oral daily  dextrose 5%. 1000 milliLiter(s) (50 mL/Hr) IV Continuous <Continuous>  dextrose 50% Injectable 12.5 Gram(s) IV Push once  dextrose 50% Injectable 25 Gram(s) IV Push once  dextrose 50% Injectable 25 Gram(s) IV Push once  docusate sodium 100 milliGRAM(s) Oral daily  finasteride 5 milliGRAM(s) Oral daily  heparin  Injectable 5000 Unit(s) SubCutaneous every 8 hours  insulin glargine Injectable (LANTUS) 5 Unit(s) SubCutaneous at bedtime  insulin lispro (HumaLOG) corrective regimen sliding scale   SubCutaneous three times a day before meals  insulin lispro (HumaLOG) corrective regimen sliding scale   SubCutaneous at bedtime  insulin lispro Injectable (HumaLOG) 2 Unit(s) SubCutaneous three times a day with meals  polyethylene glycol 3350 17 Gram(s) Oral daily  senna 2 Tablet(s) Oral at bedtime  tamsulosin 0.4 milliGRAM(s) Oral at bedtime    MEDICATIONS  (PRN):  dextrose 40% Gel 15 Gram(s) Oral once PRN Blood Glucose LESS THAN 70 milliGRAM(s)/deciliter  glucagon  Injectable 1 milliGRAM(s) IntraMuscular once PRN Glucose LESS THAN 70 milligrams/deciliter        CAPILLARY BLOOD GLUCOSE      POCT Blood Glucose.: 197 mg/dL (08 May 2019 21:11)  POCT Blood Glucose.: 124 mg/dL (08 May 2019 17:26)  POCT Blood Glucose.: 154 mg/dL (08 May 2019 12:42)  POCT Blood Glucose.: 95 mg/dL (08 May 2019 08:39)    I&O's Summary    07 May 2019 07:01  -  08 May 2019 07:00  --------------------------------------------------------  IN: 0 mL / OUT: 400 mL / NET: -400 mL    08 May 2019 07:01  -  09 May 2019 00:09  --------------------------------------------------------  IN: 718 mL / OUT: 200 mL / NET: 518 mL        PHYSICAL EXAM:  GENERAL: NAD  NECK: Supple, No JVD  CHEST/LUNG: Clear to auscultation bilaterally; No wheezing.  HEART: Regular rate and rhythm; No murmurs, rubs, or gallops  ABDOMEN: Soft, Nontender, Nondistended; Bowel sounds present  EXTREMITIES:   No edema  NEUROLOGY: AAO     LABS:                        10.2   5.44  )-----------( 153      ( 08 May 2019 06:00 )             32.7     05-08    139  |  105  |  19  ----------------------------<  94  4.2   |  23  |  1.51<H>    Ca    8.5      08 May 2019 06:00  Phos  3.5     05-07  Mg     2.0     05-07              CAPILLARY BLOOD GLUCOSE      POCT Blood Glucose.: 197 mg/dL (08 May 2019 21:11)  POCT Blood Glucose.: 124 mg/dL (08 May 2019 17:26)  POCT Blood Glucose.: 154 mg/dL (08 May 2019 12:42)  POCT Blood Glucose.: 95 mg/dL (08 May 2019 08:39)    05-05 @ 05:37  Culture-urine   NO GROWTH AT 24 HOURS  Culture results --  method type --  Organism --  Organism Identification --  Specimen source URINE MIDSTREAM           05-05 @ 05:37  Culture blood --  Culture results --  Gram stain --  Gram stain blood --  Method type --  Organism --  Organism identification --  Specimen source URINE MIDSTREAM      RADIOLOGY & ADDITIONAL TESTS:    Imaging Personally Reviewed:    Consultant(s) Notes Reviewed:      Care Discussed with Consultants/Other Providers:

## 2019-05-08 NOTE — PROGRESS NOTE ADULT - ASSESSMENT
84 year old male with htn, CAD s/p CABG, post op Afib, DM, dementia presenting with COOK, chronic diastolic chf p/w atypical CP/weakness, slurred speech,  and evidence of poorly controlled diabetes    1. Acute CVA  -likely atheroembolic etiology of his stroke  -Aspirin (81 mg once a day) and clopidogrel (75 mg once a day) for aggressive secondary stroke prevention x 3 months followed by aspirin 81 mg once a day indefinitely  -MRA w high grade stenosis of MARKO, CTA wih multiple intracranial and extracranial stenoses, Right pontine infarct again noted w/o hemorrhagic transformation   - continue to treat this ICA stenosis medically rather than consider surgical revascularization at this time per neuro   - high dose statin   - pending TTE with bubble study   - no evidence of Afib/ aflutter on tele   - will plan likely for event monitor as outpt to screen for occult cardiac arrhythmia    2. Acute/chronic diastolic CHF  -volume status stable  -hold lasix for now  -eventual restart continue bb, acei   -echo with normal LV function, EF 66%, mod - severe MR     3. Mod-severe MR  -stable  -continue medical management     4.  CAD s/p CABG  -stable   -continue statin     5 Afib, hx    no further reoccurrence,  remains in SR   continue ASA     6. HTN   bp control  per neuro     7. ckd  renal f/u          dvt ppx

## 2019-05-08 NOTE — PROGRESS NOTE ADULT - ASSESSMENT
1. PT- bed mobility,transfers, gait and balance training  2. OT- ADL'S  3. CVA-continue w/u as per neuro  4. Patient would benefit from acute rehab, needs a multidisciplinary team including PT, OT and speech, however, pt wishes to go to [facility] closer to home .  Will follow.

## 2019-05-08 NOTE — PROGRESS NOTE ADULT - SUBJECTIVE AND OBJECTIVE BOX
Endocrine follow up for diabetes. Patient states he is eating full meals, currently without symptoms    MEDICATIONS  (STANDING):  aspirin enteric coated 81 milliGRAM(s) Oral daily  atorvastatin 80 milliGRAM(s) Oral at bedtime  clopidogrel Tablet 75 milliGRAM(s) Oral daily  dextrose 5%. 1000 milliLiter(s) (50 mL/Hr) IV Continuous <Continuous>  dextrose 50% Injectable 12.5 Gram(s) IV Push once  dextrose 50% Injectable 25 Gram(s) IV Push once  dextrose 50% Injectable 25 Gram(s) IV Push once  docusate sodium 100 milliGRAM(s) Oral daily  finasteride 5 milliGRAM(s) Oral daily  heparin  Injectable 5000 Unit(s) SubCutaneous every 8 hours  insulin glargine Injectable (LANTUS) 6 Unit(s) SubCutaneous at bedtime  insulin lispro (HumaLOG) corrective regimen sliding scale   SubCutaneous three times a day before meals  insulin lispro (HumaLOG) corrective regimen sliding scale   SubCutaneous at bedtime  insulin lispro Injectable (HumaLOG) 2 Unit(s) SubCutaneous three times a day with meals  polyethylene glycol 3350 17 Gram(s) Oral daily  senna 2 Tablet(s) Oral at bedtime  tamsulosin 0.4 milliGRAM(s) Oral at bedtime    MEDICATIONS  (PRN):  dextrose 40% Gel 15 Gram(s) Oral once PRN Blood Glucose LESS THAN 70 milliGRAM(s)/deciliter  glucagon  Injectable 1 milliGRAM(s) IntraMuscular once PRN Glucose LESS THAN 70 milligrams/deciliter      Allergies  No Known Allergies    Review of Systems:  Constitutional: No fever  Eyes: No blurry vision  Neuro: No tremors  HEENT: No pain  Cardiovascular: No chest pain, palpitations  Respiratory: No SOB, no cough  GI: No nausea, vomiting, abdominal pain  : No dysuria  Skin: no rash  Psych: no depression  Endocrine: no polyuria, polydipsia  ALL OTHER SYSTEMS REVIEWED AND NEGATIVE    PHYSICAL EXAM:  VITALS: T(C): 36.3 (05-08-19 @ 05:10)  T(F): 97.4 (05-08-19 @ 05:10), Max: 98.2 (05-07-19 @ 21:15)  HR: 75 (05-08-19 @ 05:10) (69 - 75)  BP: 164/79 (05-08-19 @ 05:10) (143/73 - 164/79)  RR:  (16 - 18)  SpO2:  (98% - 100%)  Wt(kg): --  GENERAL: NAD, well-groomed, well-developed  EYES: No proptosis, no lid lag, anicteric  HEENT:  Atraumatic, Normocephalic, moist mucous membranes; Hard of hearing  RESPIRATORY: Respirations are even and unlabored  CARDIOVASCULAR: Regular rate and rhythm; No murmurs; no peripheral edema  GI: Soft, nontender, non distended, normal bowel sounds  SKIN: Dry, intact, No rashes or lesions  PSYCH: Alert and oriented x 3, normal affect, normal mood    POCT Blood Glucose.: 95 mg/dL (05-08-19 @ 08:39)  POCT Blood Glucose.: 172 mg/dL (05-07-19 @ 21:18)  Lantus 6  POCT Blood Glucose.: 152 mg/dL (05-07-19 @ 17:37)  Humalog 2+1  POCT Blood Glucose.: 131 mg/dL (05-07-19 @ 12:20)  Humalog 2  POCT Blood Glucose.: 240 mg/dL (05-07-19 @ 08:54)  Humalog 2+2  POCT Blood Glucose.: 121 mg/dL (05-06-19 @ 23:06)  POCT Blood Glucose.: 67 mg/dL (05-06-19 @ 22:52)  POCT Blood Glucose.: 58 mg/dL (05-06-19 @ 22:35)  POCT Blood Glucose.: 59 mg/dL (05-06-19 @ 22:33)  POCT Blood Glucose.: 147 mg/dL (05-06-19 @ 18:09)  POCT Blood Glucose.: 104 mg/dL (05-06-19 @ 13:35)  POCT Blood Glucose.: 159 mg/dL (05-05-19 @ 22:47)  POCT Blood Glucose.: 170 mg/dL (05-05-19 @ 21:01)  POCT Blood Glucose.: 117 mg/dL (05-05-19 @ 17:06)  POCT Blood Glucose.: 148 mg/dL (05-05-19 @ 15:23)  POCT Blood Glucose.: 219 mg/dL (05-05-19 @ 13:08)      05-08    139  |  105  |  19  ----------------------------<  94  4.2   |  23  |  1.51<H>    EGFR if : 48  EGFR if non : 42    Ca    8.5      05-08  Mg     2.0     05-07  Phos  3.5     05-07    Hemoglobin A1C, Whole Blood: 11.6 % <H> [4.0 - 5.6] (05-05-19 @ 07:35)

## 2019-05-08 NOTE — CONSULT NOTE ADULT - SUBJECTIVE AND OBJECTIVE BOX
Vascular Surgery Consult  Consulting surgical team: Vascular surgery  Consulting attending: Dr. Devine    HPI:  84M PMH DM2, HTN, HLD, CAD, s/p CABG x3, initially presented 5/5/2019 with generalized weakness and slurred speech. Upon admission patient was noted to have L facial droop and L pronator drift. Imaging consistent with acute infarct in the R paramedian ubaldo.     Since admission, patient has undergone further work up and imaging to assess etiology of stroke. CTA obtained, which showed short segment high grade stenosis of the cervical segment of the R ICA distal to the carotid bulb, measuring 70%. There is also mild narrowing of the R ICA. MRA similarly showing R ICA stenosis.       PAST MEDICAL HISTORY:  DM2 (diabetes mellitus, type 2)  Benign prostatic hyperplasia  3-vessel coronary artery disease  Pneumonia  HTN (hypertension)  DM (diabetes mellitus)      PAST SURGICAL HISTORY:  S/P CABG x 3  S/P cholecystectomy  No significant past surgical history      MEDICATIONS:  amLODIPine   Tablet 5 milliGRAM(s) Oral daily  aspirin enteric coated 81 milliGRAM(s) Oral daily  atorvastatin 80 milliGRAM(s) Oral at bedtime  clopidogrel Tablet 75 milliGRAM(s) Oral daily  dextrose 40% Gel 15 Gram(s) Oral once PRN  dextrose 5%. 1000 milliLiter(s) IV Continuous <Continuous>  dextrose 50% Injectable 12.5 Gram(s) IV Push once  dextrose 50% Injectable 25 Gram(s) IV Push once  dextrose 50% Injectable 25 Gram(s) IV Push once  docusate sodium 100 milliGRAM(s) Oral daily  finasteride 5 milliGRAM(s) Oral daily  glucagon  Injectable 1 milliGRAM(s) IntraMuscular once PRN  heparin  Injectable 5000 Unit(s) SubCutaneous every 8 hours  insulin glargine Injectable (LANTUS) 5 Unit(s) SubCutaneous at bedtime  insulin lispro (HumaLOG) corrective regimen sliding scale   SubCutaneous three times a day before meals  insulin lispro (HumaLOG) corrective regimen sliding scale   SubCutaneous at bedtime  insulin lispro Injectable (HumaLOG) 2 Unit(s) SubCutaneous three times a day with meals  polyethylene glycol 3350 17 Gram(s) Oral daily  senna 2 Tablet(s) Oral at bedtime  tamsulosin 0.4 milliGRAM(s) Oral at bedtime      ALLERGIES:  No Known Allergies      VITALS & I/Os:  Vital Signs Last 24 Hrs  T(C): 36.9 (08 May 2019 17:24), Max: 36.9 (08 May 2019 12:06)  T(F): 98.4 (08 May 2019 17:24), Max: 98.4 (08 May 2019 12:06)  HR: 60 (08 May 2019 17:24) (60 - 75)  BP: 164/78 (08 May 2019 17:24) (143/73 - 164/79)  BP(mean): --  RR: 14 (08 May 2019 17:24) (14 - 16)  SpO2: 97% (08 May 2019 17:24) (97% - 100%)    I&O's Summary    07 May 2019 07:01  -  08 May 2019 07:00  --------------------------------------------------------  IN: 0 mL / OUT: 400 mL / NET: -400 mL    08 May 2019 07:01  -  08 May 2019 20:37  --------------------------------------------------------  IN: 718 mL / OUT: 200 mL / NET: 518 mL        PHYSICAL EXAM:  General: Laying in bed, in no acute distress  Neuro: L sided facial droop, LUE drift (4/5 strength)  Respiratory: Nonlabored  Abdominal: Soft, nondistended, nontender. No rebound or guarding. No organomegaly, no palpable mass.  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 No new additional infarcts are noted since the MRI study.   Chronic white matter changes and cerebral volume loss are again   visualized.    CTA neck and head:    A short segment high-grade stenosis involves the cervical segment of the   right internal carotid artery distal to the carotid bulb, measuring   roughly 70% via NASCET criteria. The distal cervical segment however   remains patent. Mild narrowing involves the petrous segment of the right   internal carotid artery. Moderate narrowing involves the cavernous   segment of the right internal carotid artery. A severe stenosis versus   short segment occlusion involves the proximal supraclinoid segment of the   right internal carotid artery. The distal aspect of the supraclinoid   segment of the internal carotid artery fills but remains markedly small   in caliber, likely receiving collateral flow from small anterior   choroidal and posterior communicating arteries.    Calcified plaque and minor stenosis involves the origin of the left   internal carotid artery, measuring roughly 10% via NASCET criteria. No   significant stenosis involves the petrous segment. Moderate stenosis   involves the cavernous segment of the left internal carotid artery. A   moderate to high-grade short segment stenosis involves the supraclinoid   left internal carotid artery at the level of the ophthalmic artery origin.    The cervical segments of the vertebral arteries in the neck are without   focal stenosis.    Moderate narrowing involves the right middle cerebral artery.    A short segment moderate stenosis involves the most proximal aspect of   the left anterior cerebral artery. The left anterior cerebral artery   appears occluded past the level of the anterior communicating artery. The   right anterior cerebral artery remains patent.    Calcified plaque and mild stenoses involve the intracranial segments of   the vertebral arteries. No significant basilar stenosis is appreciated.   Both posterior cerebral arteries are patent.    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    CTA head and neck:    Multiple intracranial and extracranial stenoses are noted with   distribution as described.    MR Angio Neck w/ IV Cont (05.06.19 @ 18:03)  Description: A noncontrast brain MRA, 3-D time-of-flight neck MRA, and   gadolinium enhanced neck MRA study were performed. Axial flair series of   the brain was also obtained.    On the axial FLAIR series, a right pontine infarct is noted, better   appreciated on the diffusion-weighted series of the prior brain MRI   study. Mild chronic white matter changes and generalized cerebral volume   loss are again noted.    Brain MRA:    Marked decreased flow is noted involving the petrous, cavernous, and   supraclinoid segments of the right internal carotid artery, consistent   with severe stenosis. However, contrast is noted at these levels on the   gadolinium neck MRA, consistent with patency.    Moderate decreasedflow is noted involving the right middle cerebral   artery, likely secondary to the ipsilateral carotid stenosis.    A moderate to severe stenosis involves the supraclinoid left internal   carotid artery, at the level of the ophthalmic artery origin.    The left anterior cerebral artery is severely narrowed versus occluded   just past the level of the anterior communicating artery, best   appreciated on the axial source images.    Motion artifact limits evaluation of the intracranial segments of the   vertebral arteries. Minor stenoses involve the mid aspect of the left   posterior cerebral artery. No significant basilar artery stenosis is   present.    Neck MRA:    The upper aortic arch and origins of the right vessels appears   unremarkable.    A high-grade stenosis involves the right internal carotid artery distal   to the carotid bulb, best appreciated on the gadolinium neck MRA study,   likely measuring greater than 70% via NASCET criteria.    No significant stenosis involves the cervical left internal carotid   artery via NASCET criteria.    The common carotid arteries are unremarkable.    A mild stenosis involves the origin of the right vertebral artery. The   remainder of the vertebral arteries in the neck are unremarkable.    IMPRESSION:    High-grade stenoses involve the intracranial and extracranial segments of   the right internal carotid artery. Other vascular stenoses are noted with   distribution as described.    VA Duplex Carotid Arteries, Bilateral. (05.06.19 @ 09:35)  Procedure: Duplex Real-time grayscale/color Doppler  ultrasonography used to interrogate extracranial arteries  bilaterally.  Indications: Occlusion andstenosis of bilateral carotid  arteries (I65.23)  ------------------------------------------------------------------------  VELOCITY:  ------------------------------------------------------------------------  RIGHT:    Subclavian: 95 /    Prox CCA: 66/    Mid CCA: 68/    Dist CCA: 55/    Prox ICA: 28/    Mid ICA: 87 /    Distal ICA: 89 /    ECA: 194 /    Vertebral: 55 / 14    ICA/CCA: 1.3  ------------------------------------------------------------------------    Subclavian: Normal    CCA Plaque: Normal    ICA Plaque: Mild    Vertebral: Antegrade flow  ------------------------------------------------------------------------  LEFT:    Subclavian: 133 /    Prox CCA: 85/15    Mid CCA: 88/14    Dist CCA: 78/12    Prox ICA: 59/15    Mid ICA: 75 /17    Distal ICA: 45 / 14    ECA: 166 /    Vertebral: 69 / 19    ICA/CCA: 0.8  ------------------------------------------------------------------------    Subclavian: Normal    CCA Plaque: Normal    ICA Plaque: Mild    Vertebral: Antegrade flow  ------------------------------------------------------------------------  Right Findings: Right Common Carotid Artery:  Normal right  common carotid artery.  Right Internal Carotid Artery:  B-mode and spectral  analyses consistent with a diameter reduction of 16-49%.  There is mild heterogenous plaque within the proximal  vessel. No hemodynamically significant stenosis.  Right Vertebral Artery:  Antegrade flow with normal  velocities.  Left Findings: Left Common Carotid Artery:  Normal left  common carotid artery.  Left Internal Carotid Artery:  B-mode and spectral  analyses consistent with diameter reduction of 16-49%.  There is mild heterogenous plaque within the proximal  vessel. No hemodynamically significant stenosis.  Left Vertebral Artery:Antegrade flow with normal  velocities.  ------------------------------------------------------------------------  Summary/Impressions:  Mild heterogenous plaque noted within the proximal right  and left internal carotid arteries, consistent with 16-49%  stenoses in both proximal vessels.  No hemodynamically  significant stenoses noted. Vascular Surgery Consult  Consulting surgical team: Vascular surgery  Consulting attending: Dr. Devine    HPI:  84M PMH DM2, HTN, HLD, CAD, s/p CABG x3, initially presented 5/5/2019 with generalized weakness and slurred speech. Upon admission patient was noted to have L facial droop and L pronator drift. Imaging consistent with acute infarct in the R paramedian ubaldo.     Since admission, patient has undergone further work up and imaging to assess etiology of stroke. CTA obtained, which showed short segment high grade stenosis of the cervical segment of the R ICA distal to the carotid bulb, measuring 70%. There is also mild narrowing of the R ICA. MRA similarly showing R ICA stenosis.       PAST MEDICAL HISTORY:  DM2 (diabetes mellitus, type 2)  Benign prostatic hyperplasia  3-vessel coronary artery disease  Pneumonia  HTN (hypertension)  DM (diabetes mellitus)      PAST SURGICAL HISTORY:  S/P CABG x 3  S/P cholecystectomy  No significant past surgical history      MEDICATIONS:  amLODIPine   Tablet 5 milliGRAM(s) Oral daily  aspirin enteric coated 81 milliGRAM(s) Oral daily  atorvastatin 80 milliGRAM(s) Oral at bedtime  clopidogrel Tablet 75 milliGRAM(s) Oral daily  dextrose 40% Gel 15 Gram(s) Oral once PRN  dextrose 5%. 1000 milliLiter(s) IV Continuous <Continuous>  dextrose 50% Injectable 12.5 Gram(s) IV Push once  dextrose 50% Injectable 25 Gram(s) IV Push once  dextrose 50% Injectable 25 Gram(s) IV Push once  docusate sodium 100 milliGRAM(s) Oral daily  finasteride 5 milliGRAM(s) Oral daily  glucagon  Injectable 1 milliGRAM(s) IntraMuscular once PRN  heparin  Injectable 5000 Unit(s) SubCutaneous every 8 hours  insulin glargine Injectable (LANTUS) 5 Unit(s) SubCutaneous at bedtime  insulin lispro (HumaLOG) corrective regimen sliding scale   SubCutaneous three times a day before meals  insulin lispro (HumaLOG) corrective regimen sliding scale   SubCutaneous at bedtime  insulin lispro Injectable (HumaLOG) 2 Unit(s) SubCutaneous three times a day with meals  polyethylene glycol 3350 17 Gram(s) Oral daily  senna 2 Tablet(s) Oral at bedtime  tamsulosin 0.4 milliGRAM(s) Oral at bedtime      ALLERGIES:  No Known Allergies      VITALS & I/Os:  Vital Signs Last 24 Hrs  T(C): 36.9 (08 May 2019 17:24), Max: 36.9 (08 May 2019 12:06)  T(F): 98.4 (08 May 2019 17:24), Max: 98.4 (08 May 2019 12:06)  HR: 60 (08 May 2019 17:24) (60 - 75)  BP: 164/78 (08 May 2019 17:24) (143/73 - 164/79)  BP(mean): --  RR: 14 (08 May 2019 17:24) (14 - 16)  SpO2: 97% (08 May 2019 17:24) (97% - 100%)    I&O's Summary    07 May 2019 07:01  -  08 May 2019 07:00  --------------------------------------------------------  IN: 0 mL / OUT: 400 mL / NET: -400 mL    08 May 2019 07:01  -  08 May 2019 20:37  --------------------------------------------------------  IN: 718 mL / OUT: 200 mL / NET: 518 mL        PHYSICAL EXAM:  General: Laying in bed, in no acute distress  Neuro: L sided facial droop, LUE drift (4/5 strength)  Respiratory: Nonlabored  Abdominal: Soft, nondistended, nontender. No rebound or guarding. No organomegaly, no palpable mass.  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 No new additional infarcts are noted since the MRI study.   Chronic white matter changes and cerebral volume loss are again   visualized.    CTA neck and head:    A short segment high-grade stenosis involves the cervical segment of the   right internal carotid artery distal to the carotid bulb, measuring   roughly 70% via NASCET criteria. The distal cervical segment however   remains patent. Mild narrowing involves the petrous segment of the right   internal carotid artery. Moderate narrowing involves the cavernous   segment of the right internal carotid artery. A severe stenosis versus   short segment occlusion involves the proximal supraclinoid segment of the   right internal carotid artery. The distal aspect of the supraclinoid   segment of the internal carotid artery fills but remains markedly small   in caliber, likely receiving collateral flow from small anterior   choroidal and posterior communicating arteries.    Calcified plaque and minor stenosis involves the origin of the left   internal carotid artery, measuring roughly 10% via NASCET criteria. No   significant stenosis involves the petrous segment. Moderate stenosis   involves the cavernous segment of the left internal carotid artery. A   moderate to high-grade short segment stenosis involves the supraclinoid   left internal carotid artery at the level of the ophthalmic artery origin.    The cervical segments of the vertebral arteries in the neck are without   focal stenosis.    Moderate narrowing involves the right middle cerebral artery.    A short segment moderate stenosis involves the most proximal aspect of   the left anterior cerebral artery. The left anterior cerebral artery   appears occluded past the level of the anterior communicating artery. The   right anterior cerebral artery remains patent.    Calcified plaque and mild stenoses involve the intracranial segments of   the vertebral arteries. No significant basilar stenosis is appreciated.   Both posterior cerebral arteries are patent.    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    CTA head and neck:    Multiple intracranial and extracranial stenoses are noted with   distribution as described.    MR Angio Neck w/ IV Cont (05.06.19 @ 18:03)  Description: A noncontrast brain MRA, 3-D time-of-flight neck MRA, and   gadolinium enhanced neck MRA study were performed. Axial flair series of   the brain was also obtained.    On the axial FLAIR series, a right pontine infarct is noted, better   appreciated on the diffusion-weighted series of the prior brain MRI   study. Mild chronic white matter changes and generalized cerebral volume   loss are again noted.    Brain MRA:    Marked decreased flow is noted involving the petrous, cavernous, and   supraclinoid segments of the right internal carotid artery, consistent   with severe stenosis. However, contrast is noted at these levels on the   gadolinium neck MRA, consistent with patency.    Moderate decreasedflow is noted involving the right middle cerebral   artery, likely secondary to the ipsilateral carotid stenosis.    A moderate to severe stenosis involves the supraclinoid left internal   carotid artery, at the level of the ophthalmic artery origin.    The left anterior cerebral artery is severely narrowed versus occluded   just past the level of the anterior communicating artery, best   appreciated on the axial source images.    Motion artifact limits evaluation of the intracranial segments of the   vertebral arteries. Minor stenoses involve the mid aspect of the left   posterior cerebral artery. No significant basilar artery stenosis is   present.    Neck MRA:    The upper aortic arch and origins of the right vessels appears   unremarkable.    A high-grade stenosis involves the right internal carotid artery distal   to the carotid bulb, best appreciated on the gadolinium neck MRA study,   likely measuring greater than 70% via NASCET criteria.    No significant stenosis involves the cervical left internal carotid   artery via NASCET criteria.    The common carotid arteries are unremarkable.    A mild stenosis involves the origin of the right vertebral artery. The   remainder of the vertebral arteries in the neck are unremarkable.    IMPRESSION:    High-grade stenoses involve the intracranial and extracranial segments of   the right internal carotid artery. Other vascular stenoses are noted with   distribution as described.    VA Duplex Carotid Arteries, Bilateral. (05.06.19 @ 09:35)  Procedure: Duplex Real-time grayscale/color Doppler  ultrasonography used to interrogate extracranial arteries  bilaterally.  Indications: Occlusion andstenosis of bilateral carotid  arteries (I65.23)  ------------------------------------------------------------------------  VELOCITY:  ------------------------------------------------------------------------  RIGHT:    Subclavian: 95 /    Prox CCA: 66/    Mid CCA: 68/    Dist CCA: 55/    Prox ICA: 28/    Mid ICA: 87 /    Distal ICA: 89 /    ECA: 194 /    Vertebral: 55 / 14    ICA/CCA: 1.3  ------------------------------------------------------------------------    Subclavian: Normal    CCA Plaque: Normal    ICA Plaque: Mild    Vertebral: Antegrade flow  ------------------------------------------------------------------------  LEFT:    Subclavian: 133 /    Prox CCA: 85/15    Mid CCA: 88/14    Dist CCA: 78/12    Prox ICA: 59/15    Mid ICA: 75 /17    Distal ICA: 45 / 14    ECA: 166 /    Vertebral: 69 / 19    ICA/CCA: 0.8  ------------------------------------------------------------------------    Subclavian: Normal    CCA Plaque: Normal    ICA Plaque: Mild    Vertebral: Antegrade flow  ------------------------------------------------------------------------  Right Findings: Right Common Carotid Artery:  Normal right  common carotid artery.  Right Internal Carotid Artery:  B-mode and spectral  analyses consistent with a diameter reduction of 16-49%.  There is mild heterogenous plaque within the proximal  vessel. No hemodynamically significant stenosis.  Right Vertebral Artery:  Antegrade flow with normal  velocities.  Left Findings: Left Common Carotid Artery:  Normal left  common carotid artery.  Left Internal Carotid Artery:  B-mode and spectral  analyses consistent with diameter reduction of 16-49%.  There is mild heterogenous plaque within the proximal  vessel. No hemodynamically significant stenosis.  Left Vertebral Artery:Antegrade flow with normal  velocities.  ------------------------------------------------------------------------  Summary/Impressions:  Mild heterogenous plaque noted within the proximal right  and left internal carotid arteries, consistent with 16-49%  stenoses in both proximal vessels.  No hemodynamically  significant stenoses noted.

## 2019-05-08 NOTE — CONSULT NOTE ADULT - PROBLEM SELECTOR RECOMMENDATION 2
-BP borderline elevated. Goal <130/80 in DM2, however in setting for r/o CVA will defer BP control to neuro
I performed a history and physical exam of the patient and discussed  the findings and plan with the house officer. I reviewed the resident note and agree with the findings and plan

## 2019-05-08 NOTE — PROGRESS NOTE ADULT - ASSESSMENT
84y Male with history of HTN, DM presents with acute CVA. Nephrology consulted for elevated Scr.    1) TAINA; Mild and likely hemodynamically mediated in setting of diuretic therapy and ACE-I resolved with mild rise in Scr today in setting of contrast load from CT on 5/7 (90 ml). Continue with supportive care. Avoid nephrotoxins.    2) CKD-3: With significant proteinuria on UA secondary to DM. Baseline Scr 1.5 as per prior records. Patient advised to f/u as an outpatient for CKD work up including renal US. Monitor electrolytes.    3) HTN with CKD: BP uncontrolled but improving. Goal BP as per neuro given recent CVA and carotid artery stenosis. Can restart lisinopril on discharge if Scr remains stable. Monitor BP.    3) LE edema: Patient appears euvolemic with mod-severe MR on recent TTE and normal LVSF. Holding lasix 20 mg daily for now. Monitor UO.

## 2019-05-08 NOTE — PROGRESS NOTE ADULT - SUBJECTIVE AND OBJECTIVE BOX
[facility] NEPHROLOGY- PROGRESS NOTE    84y Male with history of HTN, DM presents with acute CVA. Nephrology consulted for elevated Scr.    REVIEW OF SYSTEMS:  Gen: no changes in weight  Cards: no chest pain  Resp: no dyspnea  GI: no nausea or vomiting or diarrhea  Vascular: no LE edema    No Known Allergies      Hospital Medications: Medications reviewed    VITALS:  T(F): 97.4 (05-08-19 @ 05:10), Max: 98.3 (05-07-19 @ 11:30)  HR: 75 (05-08-19 @ 05:10)  BP: 164/79 (05-08-19 @ 05:10)  RR: 16 (05-08-19 @ 05:10)  SpO2: 100% (05-08-19 @ 05:10)  Wt(kg): --  Height (cm): 162.56 (05-05 @ 11:58)  Weight (kg): 53.5 (05-05 @ 11:58)  BMI (kg/m2): 20.2 (05-05 @ 11:58)  BSA (m2): 1.56 (05-05 @ 11:58)    05-07 @ 07:01  -  05-08 @ 07:00  --------------------------------------------------------  IN: 0 mL / OUT: 400 mL / NET: -400 mL    05-08 @ 07:01  -  05-08 @ 09:39  --------------------------------------------------------  IN: 300 mL / OUT: 200 mL / NET: 100 mL        PHYSICAL EXAM:    Gen: NAD, calm  Cards: RRR, +S1/S2, + FERNANDEZ  Resp: CTA B/L  GI: soft, NT/ND, NABS  Vascular: no LE edema B/L    LABS:  05-08    139  |  105  |  19  ----------------------------<  94  4.2   |  23  |  1.51<H>    Ca    8.5      08 May 2019 06:00  Phos  3.5     05-07  Mg     2.0     05-07      Creatinine Trend: 1.51 <--, 1.36 <--, 1.44 <--, 1.78 <--                        10.2   5.44  )-----------( 153      ( 08 May 2019 06:00 )             32.7     Urine Studies:  Urinalysis Basic - ( 05 May 2019 05:20 )    Color: YELLOW / Appearance: CLEAR / SG: > 1.040 / pH: 6.5  Gluc: 500 / Ketone: NEGATIVE  / Bili: NEGATIVE / Urobili: NORMAL   Blood: NEGATIVE / Protein: >300 / Nitrite: NEGATIVE   Leuk Esterase: NEGATIVE / RBC: 0-2 / WBC 0-2   Sq Epi:  / Non Sq Epi: FEW / Bacteria: FEW          RADIOLOGY & ADDITIONAL STUDIES:  < from: CT Angio Neck w/ IV Cont (05.07.19 @ 15:18) >  IMPRESSION:    Head CT:    A right pontine infarct is again noted without associated hemorrhagic   transformation.    CTA head and neck:    Multiple intracranial and extracranial stenoses are noted with   distribution as described.    < end of copied text >

## 2019-05-08 NOTE — CONSULT NOTE ADULT - PROBLEM SELECTOR PROBLEM 1
Type 2 diabetes mellitus with hyperglycemia, with long-term current use of insulin
Carotid stenosis, symptomatic, with infarction

## 2019-05-08 NOTE — PROGRESS NOTE ADULT - ASSESSMENT
84 years old man with multiple vascular factors including age, HDL, DM II, HPLD and CAD is evaluated at [facility] for acute onset of left-sided weakness and dysarthria; last known well time 9 PM on 5/4. MRI brain showed acute infarct in the right paramedian ubaldo and mild WMH of presumed vascular origin. MRA head and neck was concerning for intracranial and extracranial right ICA stenoses. CTA head and neck performed subsequently showed diffuse intracranial and extracranial atherosclerotic stenoses including severe stenosis of proximal right ICA with concurrent critical stenosis versus occlusion of proximal supraclinoid ICA with distal reconstitution of probably through PCOM and moderate-to-severe stenosis of intracranial right vertebral artery.     Impression:  Cerebral embolism/thrombosis with cerebral infarction. Right paramedian pontine stroke - likely etiology being large vessel disease i.e. symptomatic intracranial (right vertebral artery) atherosclerotic stenosis leading to artery to artery embolism versus branch atheromatous disease/small vessel disease     Plan:  - Aspirin (81 mg once a day) and clopidogrel (75 mg once a day) for aggressive secondary stroke prevention x 3 months followed by aspirin 81 mg once a day indefinitely  - pharmacological DVT prophylaxis   - Atorvastatin 80 mg at bedtime considering admission LDL being 162 and likely atheroembolic etiology of his stroke  - HbA1C 11.6, consider aggressive vascular risk factors modifications including aggressive glycemic control  - Permissive HTN up to 220/110 mmHg for first 48-72 hours from symptom onset followed by gradual normotension  - TTE with bubble study and continue with telemetry to screen for possible cardiac source of embolism - pending   - CTA head/neck also demonstrates 70% R ICA stenosis in addition to extensive intracranial stenosis; would continue to treat this ICA stenosis medically rather than consider surgical revascularization at this time given patient's age and as this stenosis is currently asymptomatic with respect to his stroke   - Prolonged cardiac monitoring with 30 days event monitor to screen for occult cardiac arrhythmia like atrial fibrillation being the cause of possible cardiac source of embolism to be considered as outpatient  - Would also discuss with his wife regarding possibility of being enrolled into stroke-AF clinical trial  - Rehab      Above mentioned plan was discussed with patient in detail. All the questions were answered and concerns were addressed. 84 years old man with multiple vascular factors including age, HDL, DM II, HPLD and CAD is evaluated at [facility] for acute onset of left-sided weakness and dysarthria; last known well time 9 PM on 5/4. MRI brain showed acute infarct in the right paramedian ubaldo and mild WMH of presumed vascular origin. MRA head and neck was concerning for intracranial and extracranial right ICA stenoses. CTA head and neck performed subsequently showed diffuse intracranial and extracranial atherosclerotic stenoses including severe stenosis of proximal right ICA with concurrent critical stenosis versus occlusion of proximal supraclinoid ICA with distal reconstitution of probably through PCOM and moderate-to-severe stenosis of intracranial right vertebral artery.     Impression:  Cerebral embolism/thrombosis with cerebral infarction. Right paramedian pontine stroke - likely etiology being large vessel disease i.e. symptomatic intracranial (right vertebral artery) atherosclerotic stenosis leading to artery to artery embolism versus branch atheromatous disease/small vessel disease     Plan:  - Aspirin (81 mg once a day) and clopidogrel (75 mg once a day) for aggressive secondary stroke prevention x 3 months followed by aspirin 81 mg once a day indefinitely  - Agree to continue with pharmacological DVT prophylaxis   - Atorvastatin 80 mg at bedtime considering admission LDL being 162 and likely atheroembolic etiology of his stroke  - HbA1C 11.6, consider aggressive vascular risk factors modifications including aggressive glycemic control  - BP goal with gradual normotension  - TTE with bubble study and continue with telemetry to screen for possible cardiac source of embolism - pending   - CTA head/neck also demonstrates 70% R ICA stenosis in addition to extensive intracranial stenosis; would continue to treat this ICA stenosis medically rather than consider surgical revascularization at this time given patient's age and as this stenosis is currently asymptomatic with respect to his stroke   - Prolonged cardiac monitoring with 30 days event monitor to screen for occult cardiac arrhythmia like atrial fibrillation being the cause of possible cardiac source of embolism to be considered as outpatient  - Would also discuss with his wife regarding possibility of being enrolled into stroke-AF clinical trial  - Rehab evaluation  - Patient with history of possible postoperative A. Fib. would further need to clarify with the cardiologist regarding patient with postoperative A. fib versus paroxysmal A. fib. Indications to undergo further prolonged cardiac monitoring with ICM due to his history of postoperative A. fib is deferred to his cardiologist. If patient is deemed to have paroxysmal atrial fibrillation, would benefit from switching from antiplatelet therapy to therapeutic anticoagulation for secondary stroke prevention; which needs to be clarified with his cardiologist    Above mentioned plan was discussed with patient in detail. All the questions were answered and concerns were addressed.

## 2019-05-08 NOTE — PROGRESS NOTE ADULT - PROBLEM SELECTOR PLAN 1
-patient has croissants at bedside, states he is not eating it  -had full dinner last night  -AM glucose is 95 today and he is currently without symptoms  -decrease lantus to 5 units, would not aim for tight control. Glycemic control of 100-150 ideal at this time  -consistent carbohydrate diet  -check c-peptide level  -discharge: his A1c is poorly controlled while in the hospitalize he requires very small doses of insulin.  This may be due to dietary non-adherence at home  -discharge: pending c-peptide and insulin requirements. Possibly orals vs. basal + orals

## 2019-05-09 ENCOUNTER — TRANSCRIPTION ENCOUNTER (OUTPATIENT)
Age: 84
End: 2019-05-09

## 2019-05-09 DIAGNOSIS — I74.9 EMBOLISM AND THROMBOSIS OF UNSPECIFIED ARTERY: ICD-10-CM

## 2019-05-09 DIAGNOSIS — I63.239 CEREBRAL INFARCTION DUE TO UNSPECIFIED OCCLUSION OR STENOSIS OF UNSPECIFIED CAROTID ARTERY: ICD-10-CM

## 2019-05-09 LAB
ANION GAP SERPL CALC-SCNC: 8 MMO/L — SIGNIFICANT CHANGE UP (ref 7–14)
APPEARANCE UR: CLEAR — SIGNIFICANT CHANGE UP
BACTERIA # UR AUTO: NEGATIVE — SIGNIFICANT CHANGE UP
BILIRUB UR-MCNC: NEGATIVE — SIGNIFICANT CHANGE UP
BLOOD UR QL VISUAL: SIGNIFICANT CHANGE UP
BUN SERPL-MCNC: 24 MG/DL — HIGH (ref 7–23)
C PEPTIDE SERPL-MCNC: 1.4 NG/ML — SIGNIFICANT CHANGE UP (ref 1.1–4.4)
CALCIUM SERPL-MCNC: 8.6 MG/DL — SIGNIFICANT CHANGE UP (ref 8.4–10.5)
CHLORIDE SERPL-SCNC: 107 MMOL/L — SIGNIFICANT CHANGE UP (ref 98–107)
CO2 SERPL-SCNC: 27 MMOL/L — SIGNIFICANT CHANGE UP (ref 22–31)
COLOR SPEC: SIGNIFICANT CHANGE UP
CREAT ?TM UR-MCNC: 71.5 MG/DL — SIGNIFICANT CHANGE UP
CREAT SERPL-MCNC: 1.72 MG/DL — HIGH (ref 0.5–1.3)
GLUCOSE SERPL-MCNC: 119 MG/DL — HIGH (ref 70–99)
GLUCOSE UR-MCNC: 100 — SIGNIFICANT CHANGE UP
HBA1C BLD-MCNC: 11.2 % — HIGH (ref 4–5.6)
HCT VFR BLD CALC: 31.8 % — LOW (ref 39–50)
HGB BLD-MCNC: 10 G/DL — LOW (ref 13–17)
HYALINE CASTS # UR AUTO: NEGATIVE — SIGNIFICANT CHANGE UP
KETONES UR-MCNC: NEGATIVE — SIGNIFICANT CHANGE UP
LEUKOCYTE ESTERASE UR-ACNC: NEGATIVE — SIGNIFICANT CHANGE UP
MAGNESIUM SERPL-MCNC: 2.2 MG/DL — SIGNIFICANT CHANGE UP (ref 1.6–2.6)
MCHC RBC-ENTMCNC: 25.9 PG — LOW (ref 27–34)
MCHC RBC-ENTMCNC: 31.4 % — LOW (ref 32–36)
MCV RBC AUTO: 82.4 FL — SIGNIFICANT CHANGE UP (ref 80–100)
NITRITE UR-MCNC: NEGATIVE — SIGNIFICANT CHANGE UP
NRBC # FLD: 0 K/UL — SIGNIFICANT CHANGE UP (ref 0–0)
PH UR: 6 — SIGNIFICANT CHANGE UP (ref 5–8)
PLATELET # BLD AUTO: 153 K/UL — SIGNIFICANT CHANGE UP (ref 150–400)
PMV BLD: 10.2 FL — SIGNIFICANT CHANGE UP (ref 7–13)
POTASSIUM SERPL-MCNC: 4.4 MMOL/L — SIGNIFICANT CHANGE UP (ref 3.5–5.3)
POTASSIUM SERPL-SCNC: 4.4 MMOL/L — SIGNIFICANT CHANGE UP (ref 3.5–5.3)
PROT UR-MCNC: 200 — HIGH
RBC # BLD: 3.86 M/UL — LOW (ref 4.2–5.8)
RBC # FLD: 13.4 % — SIGNIFICANT CHANGE UP (ref 10.3–14.5)
RBC CASTS # UR COMP ASSIST: SIGNIFICANT CHANGE UP (ref 0–?)
SODIUM SERPL-SCNC: 142 MMOL/L — SIGNIFICANT CHANGE UP (ref 135–145)
SODIUM UR-SCNC: 45 MMOL/L — SIGNIFICANT CHANGE UP
SP GR SPEC: 1.01 — SIGNIFICANT CHANGE UP (ref 1–1.04)
SQUAMOUS # UR AUTO: SIGNIFICANT CHANGE UP
UROBILINOGEN FLD QL: NORMAL — SIGNIFICANT CHANGE UP
WBC # BLD: 4.42 K/UL — SIGNIFICANT CHANGE UP (ref 3.8–10.5)
WBC # FLD AUTO: 4.42 K/UL — SIGNIFICANT CHANGE UP (ref 3.8–10.5)
WBC UR QL: SIGNIFICANT CHANGE UP (ref 0–?)

## 2019-05-09 PROCEDURE — 99232 SBSQ HOSP IP/OBS MODERATE 35: CPT

## 2019-05-09 PROCEDURE — 93306 TTE W/DOPPLER COMPLETE: CPT | Mod: 26

## 2019-05-09 PROCEDURE — 99232 SBSQ HOSP IP/OBS MODERATE 35: CPT | Mod: GC

## 2019-05-09 PROCEDURE — 33285 INSJ SUBQ CAR RHYTHM MNTR: CPT

## 2019-05-09 RX ORDER — METOPROLOL TARTRATE 50 MG
25 TABLET ORAL DAILY
Refills: 0 | Status: DISCONTINUED | OUTPATIENT
Start: 2019-05-09 | End: 2019-05-13

## 2019-05-09 RX ADMIN — Medication 2 UNIT(S): at 09:06

## 2019-05-09 RX ADMIN — HEPARIN SODIUM 5000 UNIT(S): 5000 INJECTION INTRAVENOUS; SUBCUTANEOUS at 21:27

## 2019-05-09 RX ADMIN — AMLODIPINE BESYLATE 5 MILLIGRAM(S): 2.5 TABLET ORAL at 05:57

## 2019-05-09 RX ADMIN — TAMSULOSIN HYDROCHLORIDE 0.4 MILLIGRAM(S): 0.4 CAPSULE ORAL at 21:27

## 2019-05-09 RX ADMIN — CLOPIDOGREL BISULFATE 75 MILLIGRAM(S): 75 TABLET, FILM COATED ORAL at 14:27

## 2019-05-09 RX ADMIN — SENNA PLUS 2 TABLET(S): 8.6 TABLET ORAL at 21:27

## 2019-05-09 RX ADMIN — FINASTERIDE 5 MILLIGRAM(S): 5 TABLET, FILM COATED ORAL at 14:26

## 2019-05-09 RX ADMIN — HEPARIN SODIUM 5000 UNIT(S): 5000 INJECTION INTRAVENOUS; SUBCUTANEOUS at 05:57

## 2019-05-09 RX ADMIN — Medication 25 MILLIGRAM(S): at 18:59

## 2019-05-09 RX ADMIN — ATORVASTATIN CALCIUM 80 MILLIGRAM(S): 80 TABLET, FILM COATED ORAL at 21:27

## 2019-05-09 RX ADMIN — INSULIN GLARGINE 5 UNIT(S): 100 INJECTION, SOLUTION SUBCUTANEOUS at 22:27

## 2019-05-09 RX ADMIN — Medication 81 MILLIGRAM(S): at 14:26

## 2019-05-09 RX ADMIN — HEPARIN SODIUM 5000 UNIT(S): 5000 INJECTION INTRAVENOUS; SUBCUTANEOUS at 14:27

## 2019-05-09 RX ADMIN — Medication 1: at 22:27

## 2019-05-09 NOTE — PROGRESS NOTE ADULT - ASSESSMENT
84y Male with history of HTN, DM presents with acute CVA. Nephrology consulted for elevated Scr.    1) TAINA; Likely secondary to contrast nephropathy given CT with contrast on 5/7/19. Check UA, urine sodium and urine creatinine. Check bladder scan r/o urinary retention. Continue with supportive care. Avoid nephrotoxins.    2) CKD-3: With significant proteinuria on UA secondary to DM. Baseline Scr 1.5 as per prior records. Patient advised to f/u as an outpatient for CKD work up including renal US. Monitor electrolytes.    3) HTN with CKD: BP uncontrolled but improving with metoprolol started today as per cardiology. Goal BP as per neuro given recent CVA and carotid artery stenosis. Monitor BP.    3) LE edema: Patient appears euvolemic with mod-severe MR on recent TTE and normal LVSF. Holding lasix 20 mg daily for now. Monitor UO.

## 2019-05-09 NOTE — PROGRESS NOTE ADULT - SUBJECTIVE AND OBJECTIVE BOX
CARDIOLOGY FOLLOW UP NOTE - DR. PAYNE    Subjective:    no chest pain, sob    PHYSICAL EXAM:  T(C): 37.1 (05-09-19 @ 05:54), Max: 37.1 (05-09-19 @ 05:54)  HR: 65 (05-09-19 @ 05:54) (60 - 77)  BP: 158/75 (05-09-19 @ 05:54) (146/85 - 164/78)  RR: 14 (05-09-19 @ 05:54) (14 - 16)  SpO2: 94% (05-09-19 @ 05:54) (94% - 100%)  Wt(kg): --  I&O's Summary    08 May 2019 07:01  -  09 May 2019 07:00  --------------------------------------------------------  IN: 1118 mL / OUT: 200 mL / NET: 918 mL    09 May 2019 07:01  -  09 May 2019 11:04  --------------------------------------------------------  IN: 280 mL / OUT: 0 mL / NET: 280 mL        Appearance: Normal	  Cardiovascular: Normal S1 S2,RRR, No JVD, No murmurs  Respiratory: Lungs clear to auscultation	  Gastrointestinal:  Soft, Non-tender, + BS	  Extremities: Normal range of motion, No clubbing, cyanosis or edema  Vascular: Peripheral pulses palpable 2+ bilaterally    MEDICATIONS  (STANDING):  amLODIPine   Tablet 5 milliGRAM(s) Oral daily  aspirin enteric coated 81 milliGRAM(s) Oral daily  atorvastatin 80 milliGRAM(s) Oral at bedtime  clopidogrel Tablet 75 milliGRAM(s) Oral daily  dextrose 5%. 1000 milliLiter(s) (50 mL/Hr) IV Continuous <Continuous>  dextrose 50% Injectable 12.5 Gram(s) IV Push once  dextrose 50% Injectable 25 Gram(s) IV Push once  dextrose 50% Injectable 25 Gram(s) IV Push once  docusate sodium 100 milliGRAM(s) Oral daily  finasteride 5 milliGRAM(s) Oral daily  heparin  Injectable 5000 Unit(s) SubCutaneous every 8 hours  insulin glargine Injectable (LANTUS) 5 Unit(s) SubCutaneous at bedtime  insulin lispro (HumaLOG) corrective regimen sliding scale   SubCutaneous three times a day before meals  insulin lispro (HumaLOG) corrective regimen sliding scale   SubCutaneous at bedtime  insulin lispro Injectable (HumaLOG) 2 Unit(s) SubCutaneous three times a day with meals  polyethylene glycol 3350 17 Gram(s) Oral daily  senna 2 Tablet(s) Oral at bedtime  tamsulosin 0.4 milliGRAM(s) Oral at bedtime      TELEMETRY: 	    ECG:  	  RADIOLOGY:   DIAGNOSTIC TESTING:  [ ] Echocardiogram:  [ ] Catheterization:  [ ] Stress Test:    OTHER: 	    LABS:	 	    CARDIAC MARKERS:                                10.0   4.42  )-----------( 153      ( 09 May 2019 06:30 )             31.8     05-09    142  |  107  |  24<H>  ----------------------------<  119<H>  4.4   |  27  |  1.72<H>    Ca    8.6      09 May 2019 06:30  Mg     2.2     05-09      proBNP:     Lipid Profile:   HgA1c: Hemoglobin A1C, Whole Blood: 11.2 % (05-09 @ 06:30)

## 2019-05-09 NOTE — PROGRESS NOTE ADULT - ASSESSMENT
· Assessment	  84 year old male with htn, CAD s/p CABG, post op Afib, DM, dementia presenting with COOK, chronic diastolic chf p/w atypical CP/weakness and evidence of poorly controlled diabetes    Uncontrolled DM II:  FSSS  Endo f/up  Lantus/Lispro    Acute Rt pontine infarct:  MRI brain reviewed.  Neuro f/up noted.  CTA head/Neck: Multiple extracranial and intracranial stenosis.    Rt ICA stenosis:  Vascular f/up noted: No surgical intervention    A Fib/CAD:  Tele  Cardio f/up noted.    HTN:  Cont current BP meds

## 2019-05-09 NOTE — PROGRESS NOTE ADULT - ASSESSMENT
84 year old male with htn, CAD s/p CABG, post op Afib, DM, dementia presenting with COOK, chronic diastolic chf p/w atypical CP/weakness, slurred speech,  and evidence of poorly controlled diabetes    1. Acute CVA  -likely atheroembolic etiology of his stroke  -Aspirin (81 mg once a day) and clopidogrel (75 mg once a day) for aggressive secondary stroke prevention x 3 months followed by aspirin 81 mg once a day indefinitely  -MRA w high grade stenosis of MARKO, CTA wih multiple intracranial and extracranial stenoses, Right pontine infarct again noted w/o hemorrhagic transformation   -continue to treat this ICA stenosis medically rather than consider surgical revascularization at this time per neuro   -neuro ir eval   -high dose statin   -pending TTE with bubble study   -no evidence of Afib/ aflutter on tele   -consider loop before discharge to r/o occult AF    2. Acute/chronic diastolic CHF  -volume status stable  -hold lasix for now  -restart bb today at lower dose, watch for pauses, alina  -echo with normal LV function, EF 66%, mod - severe MR     3. Mod-severe MR  -stable  -continue medical management     4.  CAD s/p CABG  -stable   -continue statin     5 Afib, hx    no further reoccurrence,  remains in SR   continue ASA     6. HTN   bp control  per neuro     7. ckd  renal f/u          dvt ppx

## 2019-05-09 NOTE — DISCHARGE NOTE PROVIDER - CARE PROVIDER_API CALL
Davin Mcginnis)  Neurological Surgery  [address] [place] [address]  Phone: [phone]  Fax: [phone]  Follow Up Time: Davin Mcginnis)  Neurological Surgery  [address] [place] [address]  Phone: [phone]  Fax: [phone]  Follow Up Time:     Jair Villarreal)  Cardiovascular Disease; Interventional Cardiology; Nuclear Cardiology  [address], [address]  [place] [address]  Phone: [phone]  Fax: [phone]  Follow Up Time:     Chava Miramontes)  Neurology; Vascular Neurology  [address], [address]  [place] [address]  Phone: [phone]  Fax: [phone]  Follow Up Time:

## 2019-05-09 NOTE — DISCHARGE NOTE PROVIDER - HOSPITAL COURSE
85 yo M admitted with facial droop and Left pronator drift. Patient found to have a acute Right pontine infarct + CVA. Patient had EKG, CXR,LABS, tele monitoring for arrythmia.    Patient with uncontrolled diabetes and resumed lantus started humalog and endocrine consulted (house).  ekg SINUS rhythm , CXR no focal consolidation.    He had a ECHO that showed ______________________________ DISCAHRGE INCOMPLETE 84 year old male with htn, CAD s/p CABG, post op Afib, DM, dementia presenting with COOK, chronic diastolic chf p/w atypical CP/weakness, slurred speech,  and evidence of poorly controlled diabetes        1. Acute CVA    -likely atheroembolic etiology of his stroke    -Aspirin (81 mg once a day) and clopidogrel (75 mg once a day) for aggressive secondary stroke prevention x 3 months followed by aspirin 81 mg once a day indefinitely    -MRA w high grade stenosis of MARKO, CTA wih multiple intracranial and extracranial stenoses, Right pontine infarct again noted w/o hemorrhagic transformation     -continue to treat this ICA stenosis medically rather than consider surgical revascularization at this time per neuro     PtTo follow up with Dr. Davin Mcginnis at [facility]    -high dose statin     -s/p Echo: -Mitral annular calcification. Severe mitral    regurgitation.    2. Calcified trileaflet aortic valve with normal opening.    3. Moderately dilated left atrium.  LA volume index = 44    cc/m2.    4. Normal left ventricular systolic function. No segmental    wall motion abnormalities.    5. Normal right ventricular size and function.    6. Agitated saline injection demonstrates no evidence of a    patent foramen ovale.        -no evidence of Afib/ aflutter on tele     -s/p  loop Implant         2. Acute/chronic diastolic CHF    -volume status stable    -hold lasix for now    -restart bb today at lower dose, watch for pauses, alnia    -echo with normal LV function, EF 66%, mod - severe MR         3. Mod-severe MR    -stable    -continue medical management         4.  CAD s/p CABG    -stable     -continue statin         5 Afib, hx      no further reoccurrence,  remains in SR     continue ASA         6. HTN     bp control  per neuro         7. ckd    renal f/u     Case discussed with a attending, Pt is Stable for Discharge Home. 84 year old male with htn, CAD s/p CABG, post op Afib, DM, dementia presenting with COOK, chronic diastolic chf p/w atypical CP/weakness, slurred speech,  and evidence of poorly controlled diabetes        1. Acute CVA    -likely atheroembolic etiology of his stroke    -Aspirin (81 mg once a day) and clopidogrel (75 mg once a day) for aggressive secondary stroke prevention x 3 months followed by aspirin 81 mg once a day indefinitely    -MRA w high grade stenosis of MARKO, CTA with multiple intracranial and extracranial stenoses, Right pontine infarct again noted w/o hemorrhagic transformation     -continue to treat this ICA stenosis medically rather than consider surgical revascularization at this time per neuro     PtTo follow up with Dr. Davin Mcginnis at [facility]    -high dose statin     -s/p Echo: -Mitral annular calcification. Severe mitral    regurgitation.    2. Calcified trileaflet aortic valve with normal opening.    3. Moderately dilated left atrium.  LA volume index = 44    cc/m2.    4. Normal left ventricular systolic function. No segmental    wall motion abnormalities.    5. Normal right ventricular size and function.    6. Agitated saline injection demonstrates no evidence of a    patent foramen ovale.        -no evidence of Afib/ aflutter on tele     -s/p  loop Implant         2. Acute/chronic diastolic CHF    -volume status stable    -hold lasix for now    -restart bb today at lower dose, watch for pauses, alina    -echo with normal LV function, EF 66%, mod - severe MR         3. Mod-severe MR    -stable    -continue medical management         4.  CAD s/p CABG    -stable     -continue statin         5 Afib, hx      no further reoccurrence,  remains in SR     continue ASA         6. HTN     bp control  per neuro         7. ckd     CKD-3: With significant proteinuria on UA secondary to DM. Baseline Scr 1.5 as per prior records. Patient advised to f/u as an outpatient for CKD work up including renal US. Monitor electrolytes.        Case discussed with a attending, Pt is Stable for Discharge Home. 84 year old male with htn, CAD s/p CABG, post op Afib, DM, dementia presenting with COOK, chronic diastolic chf p/w atypical CP/weakness, slurred speech,  and evidence of poorly controlled diabetes        1. Acute CVA    -likely atheroembolic etiology of his stroke    -Aspirin (81 mg once a day) and clopidogrel (75 mg once a day) for aggressive secondary stroke prevention x 3 months followed by aspirin 81 mg once a day indefinitely    -MRA w high grade stenosis of MARKO, CTA with multiple intracranial and extracranial stenoses, Right pontine infarct again noted w/o hemorrhagic transformation     -continue to treat this ICA stenosis medically rather than consider surgical revascularization at this time per neuro     PtTo follow up with Dr. [facility] at [facility]    -high dose statin     -s/p Echo: -Mitral annular calcification. Severe mitral    regurgitation.    2. Calcified trileaflet aortic valve with normal opening.    3. Moderately dilated left atrium.  LA volume index = 44    cc/m2.    4. Normal left ventricular systolic function. No segmental    wall motion abnormalities.    5. Normal right ventricular size and function.    6. Agitated saline injection demonstrates no evidence of a    patent foramen ovale.        -no evidence of Afib/ aflutter on tele     -s/p  loop Implant         2. Acute/chronic diastolic CHF    -volume status stable    -hold lasix for now -can consider resuming at rehab    -restart bb today at lower dose, watch for pauses, alina    -echo with normal LV function, EF 66%, mod - severe MR         3. Mod-severe MR    -stable    -continue medical management         4.  CAD s/p CABG    -stable     -continue statin         5 Afib, hx      no further reoccurrence,  remains in SR     continue ASA         6. HTN     bp control  per neuro         7. ckd     CKD-3: With significant proteinuria on UA secondary to DM. Baseline Scr 1.5 as per prior records. Patient advised to f/u as an outpatient for CKD work up including renal US. Monitor electrolytes.        Case discussed with a attending, Pt is Stable for Discharge Home.

## 2019-05-09 NOTE — CONSULT NOTE ADULT - ASSESSMENT
This is an 85 yo male with past medical history of diabetes, hypertension, hyperlipidemia, CAD s/p 3V CABG with post-op atrial fibrillation, BPH a/w left hemiparesis, facial asymmetry and slurred speech.  MRI with acute right sided CVA. Patient denies history  of palpitations and has not had known recurrent atrial fibrillation since the CABG fady-op period.   Has not been on anticoagulation.  There is no evidence of atrial fibrillation/flutter on either EKG or telemetry.  Per Crystal - AF, this patient would benefit from prolonged monitoring for detection of occult atrial fibrillation as a cause of cardioembolic stroke.   A discussion with the patient and his wife who is at his bedside regarding the risks, benefits and alternatives to the procedure has occurred.  All questions have been answered.  They understand what it means to live with the device and that routine follow-up is required. They both agree and he has consented. He is scheduled for later today.

## 2019-05-09 NOTE — DISCHARGE NOTE PROVIDER - NSDCQMSTROKERISK_NEU_ALL_CORE
Diabetes/History of a stroke or TIA/High cholesterol/High blood pressure High cholesterol/History of a stroke or TIA/Diabetes/Atrial fibrillation/High blood pressure

## 2019-05-09 NOTE — PROGRESS NOTE ADULT - SUBJECTIVE AND OBJECTIVE BOX
[facility] NEPHROLOGY- PROGRESS NOTE    84y Male with history of HTN, DM presents with acute CVA. Nephrology consulted for elevated Scr.    REVIEW OF SYSTEMS:  Gen: no changes in weight  Cards: no chest pain  Resp: no dyspnea  GI: no nausea or vomiting or diarrhea  Vascular: no LE edema    No Known Allergies      Hospital Medications: Medications reviewed      VITALS:  T(F): 98.5 (05-09-19 @ 16:28), Max: 98.7 (05-09-19 @ 05:54)  HR: 62 (05-09-19 @ 16:28)  BP: 166/80 (05-09-19 @ 16:28)  RR: 16 (05-09-19 @ 16:28)  SpO2: 100% (05-09-19 @ 16:28)  Wt(kg): --    05-08 @ 07:01  -  05-09 @ 07:00  --------------------------------------------------------  IN: 1118 mL / OUT: 200 mL / NET: 918 mL    05-09 @ 07:01  -  05-09 @ 16:48  --------------------------------------------------------  IN: 398 mL / OUT: 200 mL / NET: 198 mL        PHYSICAL EXAM:    Gen: NAD, calm  Cards: RRR, +S1/S2, + FERNANDEZ  Resp: CTA B/L  GI: soft, NT/ND, NABS  Vascular: no LE edema B/L      LABS:  05-09    142  |  107  |  24<H>  ----------------------------<  119<H>  4.4   |  27  |  1.72<H>    Ca    8.6      09 May 2019 06:30  Mg     2.2     05-09      Creatinine Trend: 1.72 <--, 1.51 <--, 1.36 <--, 1.44 <--, 1.78 <--                        10.0   4.42  )-----------( 153      ( 09 May 2019 06:30 )             31.8     Urine Studies:  Urinalysis Basic - ( 05 May 2019 05:20 )    Color: YELLOW / Appearance: CLEAR / SG: > 1.040 / pH: 6.5  Gluc: 500 / Ketone: NEGATIVE  / Bili: NEGATIVE / Urobili: NORMAL   Blood: NEGATIVE / Protein: >300 / Nitrite: NEGATIVE   Leuk Esterase: NEGATIVE / RBC: 0-2 / WBC 0-2   Sq Epi:  / Non Sq Epi: FEW / Bacteria: FEW

## 2019-05-09 NOTE — CHART NOTE - NSCHARTNOTEFT_GEN_A_CORE
Patient s/p ILR implant against anterior chest wall.  Dressing has small area of dried blood ~1cm in diameter which was marked with pen. No evidence of swelling, ecchymosis or hematoma. Patient without complaints. Will continue to monitor. RN made aware to check site if area of blood on dressing increases.

## 2019-05-09 NOTE — PROGRESS NOTE ADULT - ASSESSMENT
84M PMH DM2, HTN, HLD, CAD, s/p CABG x3, initially presented 5/5/2019 with generalized weakness and slurred speech, found to have acute infarct in R paramedian ubaldo. Found to have R ICA stenosis on imaging.    Plan/recommendations:  - Patient with likely atheroembolic etiology of his stroke - would not recommend treating ICA stenosis surgically at this time  - Would recommend TTE, anticoagulation if embolic in etiology  - Recommend Neuro IR evaluation   - Will continue to follow    DOMINGUEZ Adam, PGY-2  Vascular surgery  p. [phone] 84M PMH DM2, HTN, HLD, CAD, s/p CABG x3, initially presented 5/5/2019 with generalized weakness and slurred speech, found to have acute infarct in R paramedian ubaldo. Found to have R ICA stenosis on imaging.    Plan/recommendations:  - Patient with likely atheroembolic etiology of his stroke - would not recommend treating ICA stenosis surgically at this time  - Would recommend TTE, anticoagulation if embolic in etiology  - Recommend Neuro IR evaluation       DOMINGUEZ Adam, PGY-2  Vascular surgery  p. [phone] 84M PMH DM2, HTN, HLD, CAD, s/p CABG x3, initially presented 5/5/2019 with generalized weakness and slurred speech, found to have acute infarct in R paramedian ubaldo. Found to have R ICA stenosis on imaging.    Plan/recommendations:  - stable from vascular standpoint  recommend neuro IR to eval for possible intervention  reconsult prn

## 2019-05-09 NOTE — PROGRESS NOTE ADULT - ATTENDING COMMENTS
[facility] NEPHROLOGY  Dorian Atkinson M.D.  Rome Castaneda D.O.  Annalise Cervantes M.D.  Lauren Arvizu, MSN, ANP-C    Telephone: [phone]  Facsimile: [phone]    [address] [place] [address]

## 2019-05-09 NOTE — CONSULT NOTE ADULT - SUBJECTIVE AND OBJECTIVE BOX
This is an 85 yo male with past medical history of diabetes, hypertension, hyperlipidemia, CAD s/p 3V CABG with post-op atrial fibrillation, BPH a/w left hemiparesis, facial asymmetry and slurred speech.  MRI with acute right sided CVA. Patient denies history  of palpitations and has not had known recurrent atrial fibrillation since the CABG fady-op period.   Has not been on anticoagulation.  There is no evidence of atrial fibrillation/flutter on either EKG or telemetry.  We are asked to evaluate him for the implantation of an ILR.         PAST MEDICAL & SURGICAL HISTORY:  DM2 (diabetes mellitus, type 2)  Benign prostatic hyperplasia  3-vessel coronary artery disease  HTN (hypertension)  S/P CABG x 3  S/P cholecystectomy      MEDICATIONS  (STANDING):  amLODIPine   Tablet 5 milliGRAM(s) Oral daily  aspirin enteric coated 81 milliGRAM(s) Oral daily  atorvastatin 80 milliGRAM(s) Oral at bedtime  clopidogrel Tablet 75 milliGRAM(s) Oral daily  dextrose 5%. 1000 milliLiter(s) (50 mL/Hr) IV Continuous <Continuous>  dextrose 50% Injectable 12.5 Gram(s) IV Push once  dextrose 50% Injectable 25 Gram(s) IV Push once  dextrose 50% Injectable 25 Gram(s) IV Push once  docusate sodium 100 milliGRAM(s) Oral daily  finasteride 5 milliGRAM(s) Oral daily  heparin  Injectable 5000 Unit(s) SubCutaneous every 8 hours  insulin glargine Injectable (LANTUS) 5 Unit(s) SubCutaneous at bedtime  insulin lispro (HumaLOG) corrective regimen sliding scale   SubCutaneous three times a day before meals  insulin lispro (HumaLOG) corrective regimen sliding scale   SubCutaneous at bedtime  insulin lispro Injectable (HumaLOG) 2 Unit(s) SubCutaneous three times a day with meals  metoprolol succinate ER 25 milliGRAM(s) Oral daily  polyethylene glycol 3350 17 Gram(s) Oral daily  senna 2 Tablet(s) Oral at bedtime  tamsulosin 0.4 milliGRAM(s) Oral at bedtime    MEDICATIONS  (PRN):  dextrose 40% Gel 15 Gram(s) Oral once PRN Blood Glucose LESS THAN 70 milliGRAM(s)/deciliter  glucagon  Injectable 1 milliGRAM(s) IntraMuscular once PRN Glucose LESS THAN 70 milligrams/deciliter      FAMILY HISTORY:  No pertinent family history in first degree relatives      SOCIAL HISTORY:  lives with his wife    CIGARETTES: never  DRUGS;   never  ALCOHOL:    no    REVIEW OF SYSTEMS:    CONSTITUTIONAL: No fever, weight loss, chills, shakes, or fatigue  EYES: No eye pain, visual disturbances, or discharge  ENMT: Very [place] -> wears hearing aides but left them at home.  NECK: No pain or stiffness  BREASTS: No pain, masses, or nipple discharge  RESPIRATORY: No cough, wheezing, hemoptysis, or shortness of breath  CARDIOVASCULAR: No chest pain, dyspnea, palpitations, dizziness, syncope, paroxysmal nocturnal dyspnea, orthopnea, or arm or leg swelling  GASTROINTESTINAL: No abdominal  or epigastric pain, nausea, vomiting, hematemesis, diarrhea, constipation, melena or bright red blood.  GENITOURINARY: No dysuria, nocturia, hematuria, or urinary incontinence  NEUROLOGICAL: No headaches + memory loss  +slurred speech   + left sided limb weakness w/ loss of strength  SKIN: No itching, burning, rashes, or lesions   LYMPH NODES: No enlarged glands  ENDOCRINE: No heat or cold intolerance, or hair loss  MUSCULOSKELETAL: No joint pain or swelling, muscle, back, or extremity pain  PSYCHIATRIC: No depression, anxiety, or difficulty sleeping  HEME/LYMPH: No easy bruising or bleeding gums  ALLERGY AND IMMUNOLOGIC: No hives or rash.      Vital Signs Last 24 Hrs  T(C): 36.9 (09 May 2019 12:47), Max: 37.1 (09 May 2019 05:54)  T(F): 98.4 (09 May 2019 12:47), Max: 98.7 (09 May 2019 05:54)  HR: 76 (09 May 2019 12:47) (60 - 77)  BP: 150/71 (09 May 2019 12:47) (146/85 - 164/78)  BP(mean): --  RR: 17 (09 May 2019 12:47) (14 - 17)  SpO2: 97% (09 May 2019 12:47) (94% - 97%)    PHYSICAL EXAM:    GENERAL: In no apparent distress  HEAD:  Atraumatic, Normocephalic  EYES: EOMI, PERRLA, conjunctiva and sclera clear  ENMT: No tonsillar erythema, exudates, or enlargement;  Very [place] both ears  NECK: Supple and normal thyroid.  No JVD or carotid bruit.  Carotid pulse is 2+ bilaterally.  HEART: Regular rate and rhythm; + murmur, No rubs or gallops.  PULMONARY: Clear to auscultation and perfusion.  No rales, wheezing, or rhonchi bilaterally.  ABDOMEN: Soft, Nontender, Nondistended; Bowel sounds present  EXTREMITIES:  2+ Peripheral Pulses, No clubbing, cyanosis, or edema  LYMPH: No lymphadenopathy noted  NEUROLOGICAL: + facial droop.  Improved speech.            INTERPRETATION OF TELEMETRY:  Normal sinus rhythm 60-70's with 2.08  second pause/ APC's    ECG: Normal sinus rhythm with 1st degree AVB.  QRS 90ms  QTc 420ms      LABS:                        10.0   4.42  )-----------( 153      ( 09 May 2019 06:30 )             31.8     05-09    142  |  107  |  24<H>  ----------------------------<  119<H>  4.4   |  27  |  1.72<H>    Ca    8.6      09 May 2019 06:30  Mg     2.2     05-09          RADIOLOGY & ADDITIONAL STUDIES:  PREVIOUS DIAGNOSTIC TESTING:      Brain MRA:    Marked decreased flow is noted involving the petrous, cavernous, and   supraclinoid segments of the right internal carotid artery, consistent   with severe stenosis. However, contrast is noted at these levels on the   gadolinium neck MRA, consistent with patency.    Moderate decreasedflow is noted involving the right middle cerebral   artery, likely secondary to the ipsilateral carotid stenosis.    A moderate to severe stenosis involves the supraclinoid left internal   carotid artery, at the level of the ophthalmic artery origin.    The left anterior cerebral artery is severely narrowed versus occluded   just past the level of the anterior communicating artery, best   appreciated on the axial source images.    Motion artifact limits evaluation of the intracranial segments of the   vertebral arteries. Minor stenoses involve the mid aspect of the left   posterior cerebral artery. No significant basilar artery stenosis is   present.    Neck MRA:    The upper aortic arch and origins of the right vessels appears   unremarkable.    A high-grade stenosis involves the right internal carotid artery distal   to the carotid bulb, best appreciated on the gadolinium neck MRA study,   likely measuring greater than 70% via NASCET criteria.    No significant stenosis involves the cervical left internal carotid   artery via NASCET criteria.    The common carotid arteries are unremarkable.    A mild stenosis involves the origin of the right vertebral artery. The   remainder of the vertebral arteries in the neck are unremarkable.    IMPRESSION:    High-grade stenoses involve the intracranial and extracranial segments of   the right internal carotid artery. Other vascular stenoses are noted with   distribution as described.    LILO MCCARTNEY M.D., ATTENDING RADIOLOGIST  This document has been electronically signed. May  7 2019  8:15AM          ECHO  FINDINGS:  ------------------------------------------------------------------------  CONCLUSIONS:  1. Moderate-severe mitral regurgitation. This may be  underestimated.  2. Normal left ventricular internal dimensions and wall  thicknesses.  3. Normal left ventricular systolic function. No segmental  wall motion abnormalities.  EF 66%  4. Normal right ventricular size and function.  5. Normal tricuspid valve. Mild tricuspid regurgitation.  6. Estimated pulmonary artery systolic pressure equals 43  mm Hg, assuming right atrial pressure equals 10  mm Hg,  consistent with mild pulmonary hypertension.  **Consider TEEfor better assessment of mitral valve, if  clinically indicated.  ------------------------------------------------------------------------  Confirmed on  11/25/2018 - 16:34:18 by Allegra Ron MD  ------------------------------------------------------------------------    STRESS  FINDINGS:      none    CATHETERIZATION  FINDINGS:             none

## 2019-05-09 NOTE — PROGRESS NOTE ADULT - SUBJECTIVE AND OBJECTIVE BOX
Patient is a 84y old  Male who presents with a chief complaint of Stroke (09 May 2019 06:35)      SUBJECTIVE / OVERNIGHT EVENTS:    Events noted.  CONSTITUTIONAL: No fever,  or fatigue  RESPIRATORY: No cough, wheezing,  No shortness of breath  CARDIOVASCULAR: No chest pain, palpitations, dizziness, or leg swelling  GASTROINTESTINAL: No abdominal or epigastric pain. No nausea, vomiting.  NEUROLOGICAL: No headaches,     MEDICATIONS  (STANDING):  amLODIPine   Tablet 5 milliGRAM(s) Oral daily  aspirin enteric coated 81 milliGRAM(s) Oral daily  atorvastatin 80 milliGRAM(s) Oral at bedtime  clopidogrel Tablet 75 milliGRAM(s) Oral daily  dextrose 5%. 1000 milliLiter(s) (50 mL/Hr) IV Continuous <Continuous>  dextrose 50% Injectable 12.5 Gram(s) IV Push once  dextrose 50% Injectable 25 Gram(s) IV Push once  dextrose 50% Injectable 25 Gram(s) IV Push once  docusate sodium 100 milliGRAM(s) Oral daily  finasteride 5 milliGRAM(s) Oral daily  heparin  Injectable 5000 Unit(s) SubCutaneous every 8 hours  insulin glargine Injectable (LANTUS) 5 Unit(s) SubCutaneous at bedtime  insulin lispro (HumaLOG) corrective regimen sliding scale   SubCutaneous three times a day before meals  insulin lispro (HumaLOG) corrective regimen sliding scale   SubCutaneous at bedtime  insulin lispro Injectable (HumaLOG) 2 Unit(s) SubCutaneous three times a day with meals  polyethylene glycol 3350 17 Gram(s) Oral daily  senna 2 Tablet(s) Oral at bedtime  tamsulosin 0.4 milliGRAM(s) Oral at bedtime    MEDICATIONS  (PRN):  dextrose 40% Gel 15 Gram(s) Oral once PRN Blood Glucose LESS THAN 70 milliGRAM(s)/deciliter  glucagon  Injectable 1 milliGRAM(s) IntraMuscular once PRN Glucose LESS THAN 70 milligrams/deciliter        CAPILLARY BLOOD GLUCOSE      POCT Blood Glucose.: 109 mg/dL (09 May 2019 08:47)  POCT Blood Glucose.: 197 mg/dL (08 May 2019 21:11)  POCT Blood Glucose.: 124 mg/dL (08 May 2019 17:26)  POCT Blood Glucose.: 154 mg/dL (08 May 2019 12:42)    I&O's Summary    08 May 2019 07:01  -  09 May 2019 07:00  --------------------------------------------------------  IN: 1118 mL / OUT: 200 mL / NET: 918 mL        PHYSICAL EXAM:  GENERAL: NAD  NECK: Supple, No JVD  CHEST/LUNG: Clear to auscultation bilaterally; No wheezing.  HEART: Regular rate and rhythm; No murmurs, rubs, or gallops  ABDOMEN: Soft, Nontender, Nondistended; Bowel sounds present  EXTREMITIES:   No edema  NEUROLOGY: AAO X 3      LABS:                        10.0   4.42  )-----------( 153      ( 09 May 2019 06:30 )             31.8     05-09    142  |  107  |  24<H>  ----------------------------<  119<H>  4.4   |  27  |  1.72<H>    Ca    8.6      09 May 2019 06:30  Mg     2.2     05-09              CAPILLARY BLOOD GLUCOSE      POCT Blood Glucose.: 109 mg/dL (09 May 2019 08:47)  POCT Blood Glucose.: 197 mg/dL (08 May 2019 21:11)  POCT Blood Glucose.: 124 mg/dL (08 May 2019 17:26)  POCT Blood Glucose.: 154 mg/dL (08 May 2019 12:42)    05-05 @ 05:37  Culture-urine   NO GROWTH AT 24 HOURS  Culture results --  method type --  Organism --  Organism Identification --  Specimen source URINE MIDSTREAM           05-05 @ 05:37  Culture blood --  Culture results --  Gram stain --  Gram stain blood --  Method type --  Organism --  Organism identification --  Specimen source URINE MIDSTREAM      RADIOLOGY & ADDITIONAL TESTS:    Imaging Personally Reviewed:    Consultant(s) Notes Reviewed:      Care Discussed with Consultants/Other Providers:

## 2019-05-09 NOTE — DISCHARGE NOTE PROVIDER - CARE PROVIDERS DIRECT ADDRESSES
,[email or web address]

## 2019-05-09 NOTE — PROGRESS NOTE ADULT - SUBJECTIVE AND OBJECTIVE BOX
HPI:  83 yo male PMhx DM2, HTN, HLD, CAD, s/p CABG x3 p/w generalized weakness and slurred speech yesterday at 10pm. Pt poor historian, wife at bedside gave most of history. Pt's last seen normal yesterday morning @ 8am where pt is fully functional, ambulates freely without any aids. Pt had dinner at around 8pm. At 10 pm pt developed sudden generalized weakness, bilateral blurred vision and slurred speech. Pt checked his BFS 65. EMS was called, BFS by EMS was 293. Pt didn't eat anything after 1st BFS. Pt admits SOB at that time and polyuria. Pt denies chest pain, palpitations, facial palsy, headache, nausea, vomiting or abdominal pain.    In ED pt was given IV NS x1L.    On admission, pt found to have L facial droop and L pronator drift. (05 May 2019 11:58)  MRI_  MPRESSION:    Acute right pontine infarct.    Right internal carotid artery absent flow void suspicious for decreased   flow or thrombosis. CT or MR angiography of the head and neck is advised   for further evaluation.    swallow eval-> mechanical soft with nectar thick   participated with PT  pending TTE   REVIEW OF SYSTEMS: No chest pain, shortness of breath, nausea, vomiting or diarhea.        CURRENT FUNCTIONAL STATUS: min a       Vital Signs Last 24 Hrs  T(C): 36.9 (09 May 2019 12:47), Max: 37.1 (09 May 2019 05:54)  T(F): 98.4 (09 May 2019 12:47), Max: 98.7 (09 May 2019 05:54)  HR: 76 (09 May 2019 12:47) (60 - 77)  BP: 150/71 (09 May 2019 12:47) (146/85 - 164/78)  BP(mean): --  RR: 17 (09 May 2019 12:47) (14 - 17)  SpO2: 97% (09 May 2019 12:47) (94% - 97%)  ----------------------------------------------------------------------------------------  PHYSICAL EXAM  Constitutional - NAD, Comfortable  Extremities - No C/C/E, No calf tenderness   Neurologic Exam -                    Cognitive - Awake, Alert, AAO to self, place, date, year, situation     Communication+dysarthria, no aphasia     Cranial Nerves -left facial droop      Motor - L hemiparesis 4/5 LUE/LLE                        Sensory - Intact to LT     Reflexes - DTR Intact, No primitive reflexive     Balance - poor   Psychiatric - Mood stable, Affect WNL

## 2019-05-09 NOTE — DISCHARGE NOTE PROVIDER - NSDCCPCAREPLAN_GEN_ALL_CORE_FT
PRINCIPAL DISCHARGE DIAGNOSIS  Diagnosis: Cerebrovascular accident (CVA)  Assessment and Plan of Treatment: Continue physical therapy and skills learned for rehabilitation.  Follow up with your neurologist in 1-2 weeks for further medical management.  Continue to take your medications as prescribed, low salt, low fat, and diabetic diet.  Consider joining a support group for stroke survivors for emotional support to prevent depression.        SECONDARY DISCHARGE DIAGNOSES  Diagnosis: Atrial fibrillation  Assessment and Plan of Treatment: Please take your medications as prescribed.  Continue to take your blood thinner as prescribed and follow with your physician to monitor your levels.  Low fat diet, reduce caffeine intake, and exercise at least 30 minutes daily.      Diagnosis: Mitral regurgitation  Assessment and Plan of Treatment: Follow up with Your Cardiologist    Diagnosis: Carotid stenosis, symptomatic, with infarction  Assessment and Plan of Treatment: Follow up with Dr. Davin Mcginnis at [facility]    Diagnosis: Hyperlipidemia  Assessment and Plan of Treatment: Low fat diet, exercise daily and continue current medications. Follow up with primary care physician and cardiologist for management.      Diagnosis: Acute on chronic diastolic HF (heart failure)  Assessment and Plan of Treatment: Low salt diet, fluid restriction to 1500 ml daily, monitor your fluid intake and weight daily, exercise as tolerated 30 minutes daily, and follow up with your physician within 1 to 2 weeks. PRINCIPAL DISCHARGE DIAGNOSIS  Diagnosis: Cerebrovascular accident (CVA)  Assessment and Plan of Treatment: Continue physical therapy and skills learned for rehabilitation.  Follow up with your neurologist in 1-2 weeks for further medical management.  Your information was emailed to the neurology office they will set up a follow up appointment.  Continue to take your medications as prescribed, low salt, low fat, and diabetic diet.  Consider joining a support group for stroke survivors for emotional support to prevent depression.        SECONDARY DISCHARGE DIAGNOSES  Diagnosis: Diabetes mellitus, type 2  Assessment and Plan of Treatment: Please monitor finger sticks and follow up with your endocrinologist. Consistent carb diet.    Diagnosis: Atrial fibrillation  Assessment and Plan of Treatment: Please take your medications as prescribed.  Continue to take your blood thinner as prescribed and follow with your physician to monitor your levels.  Low fat diet, reduce caffeine intake, and exercise at least 30 minutes daily.      Diagnosis: Hyperlipidemia  Assessment and Plan of Treatment: Low fat diet, exercise daily and continue current medications. Follow up with primary care physician and cardiologist for management.      Diagnosis: Carotid stenosis, symptomatic, with infarction  Assessment and Plan of Treatment: Follow up with Dr. Davin Mcginnis at [facility]    Diagnosis: Mitral regurgitation  Assessment and Plan of Treatment: Follow up with Your Cardiologist    Diagnosis: Acute on chronic diastolic HF (heart failure)  Assessment and Plan of Treatment: Low salt diet, fluid restriction to 1500 ml daily, monitor your fluid intake and weight daily, exercise as tolerated 30 minutes daily, and follow up with your physician within 1 to 2 weeks. Lasix on hold for now, if an evidence of fluid retention please consider resuming lasix.

## 2019-05-09 NOTE — DISCHARGE NOTE PROVIDER - PROVIDER TOKENS
PROVIDER:[TOKEN:[[id number]]] PROVIDER:[TOKEN:[[id number]]],PROVIDER:[TOKEN:[[id number]]],PROVIDER:[TOKEN:[[id number]]]

## 2019-05-10 DIAGNOSIS — I10 ESSENTIAL (PRIMARY) HYPERTENSION: ICD-10-CM

## 2019-05-10 DIAGNOSIS — E78.5 HYPERLIPIDEMIA, UNSPECIFIED: ICD-10-CM

## 2019-05-10 LAB
ANION GAP SERPL CALC-SCNC: 7 MMO/L — SIGNIFICANT CHANGE UP (ref 7–14)
BUN SERPL-MCNC: 23 MG/DL — SIGNIFICANT CHANGE UP (ref 7–23)
CALCIUM SERPL-MCNC: 8.6 MG/DL — SIGNIFICANT CHANGE UP (ref 8.4–10.5)
CHLORIDE SERPL-SCNC: 105 MMOL/L — SIGNIFICANT CHANGE UP (ref 98–107)
CO2 SERPL-SCNC: 28 MMOL/L — SIGNIFICANT CHANGE UP (ref 22–31)
CREAT SERPL-MCNC: 1.8 MG/DL — HIGH (ref 0.5–1.3)
GLUCOSE SERPL-MCNC: 76 MG/DL — SIGNIFICANT CHANGE UP (ref 70–99)
HCT VFR BLD CALC: 31.8 % — LOW (ref 39–50)
HGB BLD-MCNC: 10.1 G/DL — LOW (ref 13–17)
MAGNESIUM SERPL-MCNC: 2.3 MG/DL — SIGNIFICANT CHANGE UP (ref 1.6–2.6)
MCHC RBC-ENTMCNC: 26.4 PG — LOW (ref 27–34)
MCHC RBC-ENTMCNC: 31.8 % — LOW (ref 32–36)
MCV RBC AUTO: 83 FL — SIGNIFICANT CHANGE UP (ref 80–100)
NRBC # FLD: 0 K/UL — SIGNIFICANT CHANGE UP (ref 0–0)
PLATELET # BLD AUTO: 158 K/UL — SIGNIFICANT CHANGE UP (ref 150–400)
PMV BLD: 10.5 FL — SIGNIFICANT CHANGE UP (ref 7–13)
POTASSIUM SERPL-MCNC: 4 MMOL/L — SIGNIFICANT CHANGE UP (ref 3.5–5.3)
POTASSIUM SERPL-SCNC: 4 MMOL/L — SIGNIFICANT CHANGE UP (ref 3.5–5.3)
RBC # BLD: 3.83 M/UL — LOW (ref 4.2–5.8)
RBC # FLD: 13.2 % — SIGNIFICANT CHANGE UP (ref 10.3–14.5)
SODIUM SERPL-SCNC: 140 MMOL/L — SIGNIFICANT CHANGE UP (ref 135–145)
WBC # BLD: 5.03 K/UL — SIGNIFICANT CHANGE UP (ref 3.8–10.5)
WBC # FLD AUTO: 5.03 K/UL — SIGNIFICANT CHANGE UP (ref 3.8–10.5)

## 2019-05-10 PROCEDURE — 99232 SBSQ HOSP IP/OBS MODERATE 35: CPT

## 2019-05-10 RX ORDER — ASPIRIN/CALCIUM CARB/MAGNESIUM 324 MG
1 TABLET ORAL
Qty: 0 | Refills: 0 | DISCHARGE
Start: 2019-05-10

## 2019-05-10 RX ORDER — INSULIN GLARGINE 100 [IU]/ML
5 INJECTION, SOLUTION SUBCUTANEOUS
Qty: 0 | Refills: 0 | DISCHARGE
Start: 2019-05-10

## 2019-05-10 RX ORDER — ATORVASTATIN CALCIUM 80 MG/1
1 TABLET, FILM COATED ORAL
Qty: 0 | Refills: 0 | DISCHARGE
Start: 2019-05-10

## 2019-05-10 RX ORDER — METOPROLOL TARTRATE 50 MG
1 TABLET ORAL
Qty: 0 | Refills: 0 | DISCHARGE
Start: 2019-05-10

## 2019-05-10 RX ORDER — AMLODIPINE BESYLATE 2.5 MG/1
1 TABLET ORAL
Qty: 0 | Refills: 0 | DISCHARGE
Start: 2019-05-10

## 2019-05-10 RX ORDER — CLOPIDOGREL BISULFATE 75 MG/1
1 TABLET, FILM COATED ORAL
Qty: 0 | Refills: 0 | DISCHARGE
Start: 2019-05-10

## 2019-05-10 RX ADMIN — SENNA PLUS 2 TABLET(S): 8.6 TABLET ORAL at 22:09

## 2019-05-10 RX ADMIN — HEPARIN SODIUM 5000 UNIT(S): 5000 INJECTION INTRAVENOUS; SUBCUTANEOUS at 22:09

## 2019-05-10 RX ADMIN — Medication 2 UNIT(S): at 17:50

## 2019-05-10 RX ADMIN — Medication 25 MILLIGRAM(S): at 05:41

## 2019-05-10 RX ADMIN — TAMSULOSIN HYDROCHLORIDE 0.4 MILLIGRAM(S): 0.4 CAPSULE ORAL at 22:09

## 2019-05-10 RX ADMIN — CLOPIDOGREL BISULFATE 75 MILLIGRAM(S): 75 TABLET, FILM COATED ORAL at 12:41

## 2019-05-10 RX ADMIN — FINASTERIDE 5 MILLIGRAM(S): 5 TABLET, FILM COATED ORAL at 12:41

## 2019-05-10 RX ADMIN — AMLODIPINE BESYLATE 5 MILLIGRAM(S): 2.5 TABLET ORAL at 05:41

## 2019-05-10 RX ADMIN — Medication 81 MILLIGRAM(S): at 12:41

## 2019-05-10 RX ADMIN — Medication 2 UNIT(S): at 12:41

## 2019-05-10 RX ADMIN — Medication 100 MILLIGRAM(S): at 12:41

## 2019-05-10 RX ADMIN — ATORVASTATIN CALCIUM 80 MILLIGRAM(S): 80 TABLET, FILM COATED ORAL at 22:09

## 2019-05-10 RX ADMIN — INSULIN GLARGINE 5 UNIT(S): 100 INJECTION, SOLUTION SUBCUTANEOUS at 22:09

## 2019-05-10 RX ADMIN — HEPARIN SODIUM 5000 UNIT(S): 5000 INJECTION INTRAVENOUS; SUBCUTANEOUS at 05:40

## 2019-05-10 RX ADMIN — Medication 2 UNIT(S): at 09:10

## 2019-05-10 NOTE — PROGRESS NOTE ADULT - ASSESSMENT
84 year old male with htn, CAD s/p CABG, post op Afib, DM, dementia presenting with COOK, chronic diastolic chf p/w atypical CP/weakness, slurred speech,  and evidence of poorly controlled diabetes    1. Acute CVA  -likely atheroembolic etiology of his stroke  -Aspirin (81 mg once a day) and clopidogrel (75 mg once a day) for aggressive secondary stroke prevention x 3 months followed by aspirin 81 mg once a day indefinitely  -MRA w high grade stenosis of MARKO, CTA wih multiple intracranial and extracranial stenoses, Right pontine infarct again noted w/o hemorrhagic transformation   -continue to treat this ICA stenosis medically rather than consider surgical revascularization at this time per neuro   --high dose statin   -tte normal lv, bubble study nl   -no evidence of Afib/ aflutter on tele   -s/p ilr    2. Acute/chronic diastolic CHF  -volume status stable  -hold lasix for now, restart monday   -cont toprol 25 daily, cont to monitor for alina, pauses     3. MR  -severe mr on tte today   -continue medical management for now, not canddiate for any mv therapy currently in light of acute cva     4.  CAD s/p CABG  -stable   -continue statin     5 Afib, hx    no further reoccurrence,  remains in SR   continue ASA     6. HTN   restart lisinopril once creat better    7. ckd  renal f/u          dvt ppx

## 2019-05-10 NOTE — PROGRESS NOTE ADULT - PROBLEM SELECTOR PLAN 1
-FS's overall ok today. He had elevated value last night, but was NPO most of day and ate late w/o receiving insulin.   -cont. lantus 5 units.  -cont. humalog 2-2-2  -cont. low dose SS TIDAC/qhs  -Cont. to monitor FS's TIDAC/qhs  -consistent carbohydrate diet    discharge: his A1c is poorly controlled while in the hospitalize he requires very small doses of insulin.  This may be due to dietary non-adherence at home  -discharge: pending insulin requirements. Possibly orals vs. basal + orals

## 2019-05-10 NOTE — PROGRESS NOTE ADULT - SUBJECTIVE AND OBJECTIVE BOX
[facility] NEPHROLOGY- PROGRESS NOTE    84y Male with history of HTN, DM presents with acute CVA. Nephrology consulted for elevated Scr.    REVIEW OF SYSTEMS:  Gen: no changes in weight  Cards: no chest pain  Resp: no dyspnea  GI: no nausea or vomiting or diarrhea  Vascular: no LE edema    No Known Allergies      Hospital Medications: Medications reviewed      VITALS:  T(F): 98.4 (05-10-19 @ 05:39), Max: 98.5 (19 @ 16:28)  HR: 60 (05-10-19 @ 05:39)  BP: 138/70 (05-10-19 @ 05:39)  RR: 16 (05-10-19 @ 05:39)  SpO2: 98% (05-10-19 @ 05:39)  Wt(kg): --     @ 07:01  -  05-10 @ 07:00  --------------------------------------------------------  IN: 618 mL / OUT: 350 mL / NET: 268 mL        PHYSICAL EXAM:    Gen: NAD, calm  Cards: RRR, +S1/S2, + FERNANDEZ  Resp: CTA B/L  GI: soft, NT/ND, NABS  Vascular: no LE edema B/L      LABS:  05-10    140  |  105  |  23  ----------------------------<  76  4.0   |  28  |  1.80<H>    Ca    8.6      10 May 2019 06:24  Mg     2.3     05-10      Creatinine Trend: 1.80 <--, 1.72 <--, 1.51 <--, 1.36 <--, 1.44 <--, 1.78 <--                        10.1   5.03  )-----------( 158      ( 10 May 2019 06:24 )             31.8     Urine Studies:  Urinalysis Basic - ( 09 May 2019 21:20 )    Color: LIGHT YELLOW / Appearance: CLEAR / S.013 / pH: 6.0  Gluc: 100 / Ketone: NEGATIVE  / Bili: NEGATIVE / Urobili: NORMAL   Blood: TRACE / Protein: 200 / Nitrite: NEGATIVE   Leuk Esterase: NEGATIVE / RBC: 0-2 / WBC 0-2   Sq Epi: OCC / Non Sq Epi:  / Bacteria: NEGATIVE      Sodium, Random Urine: 45 mmol/L ( @ 21:20)  Creatinine, Random Urine: 71.50 mg/dL ( @ 21:20)

## 2019-05-10 NOTE — CHART NOTE - NSCHARTNOTEFT_GEN_A_CORE
ELECTROPHYSIOLOGY    Patient s/p ILR yesterday.  tolerated the procedure well.  No complications. Patient denies pain at the device site.  No complaints of chest pain, shortness of breath, palpations or lightheadedness. Device site with small amount ecchymosis. No active bleeding. No hematoma.   Telemetry:  Sinus bradycardia 54 bpm.  No evidence of atrial fibrillation.  Post procedure ILR teaching done with patient and his wife.  Written instructions and contact information provided.    He was given a home monitor with instructions.   He has a follow-up appointment at the device clinic on Friday 5/24/2019 at 12:30pm 4th floor Oncology [facility]  [phone].

## 2019-05-10 NOTE — PROVIDER CONTACT NOTE (OTHER) - BACKGROUND
Stroke, uncontrolled DM2, permissive HTN
pt admitted for stroke
Pt admitted for stroke and uncontrolled DM2, permissive HTN

## 2019-05-10 NOTE — PROVIDER CONTACT NOTE (OTHER) - ASSESSMENT
PT asymptomatic.
pt asymptomatic
pt asymptomatic with no c/o distress. VS taken and stable. /74, temp 98, HR 74, 97% on room air
vitals as noted
asymptomatic

## 2019-05-10 NOTE — PROGRESS NOTE ADULT - ASSESSMENT
84y Male with history of HTN, DM presents with acute CVA. Nephrology consulted for elevated Scr.    1) TAINA; Likely secondary to contrast nephropathy given CT with contrast on 5/7/19. UA bland with low FeNa consistent with diagnosis. Continue with supportive care. Avoid nephrotoxins.    2) CKD-3: With significant proteinuria on UA secondary to DM. Baseline Scr 1.5 as per prior records. Patient advised to f/u as an outpatient for CKD work up including renal US. Monitor electrolytes.    3) HTN with CKD: BP improving. Goal BP as per neuro given recent CVA and carotid artery stenosis. Monitor BP.    3) LE edema: Patient appears euvolemic with mod-severe MR on recent TTE and normal LVSF. Holding lasix 20 mg daily for now. Monitor UO.      No renal objection to discharge but would hold home lisinopril and lasix and have patient repeat renal panel next week to monitor for resolution of TAINA.

## 2019-05-10 NOTE — PROGRESS NOTE ADULT - SUBJECTIVE AND OBJECTIVE BOX
Chief Complaint: DM2    S: Pt. feeling ok today, w/o complaints. As per wife, he has had a good appetite. No c/o N/V.    MEDICATIONS  (STANDING):  amLODIPine   Tablet 5 milliGRAM(s) Oral daily  aspirin enteric coated 81 milliGRAM(s) Oral daily  atorvastatin 80 milliGRAM(s) Oral at bedtime  clopidogrel Tablet 75 milliGRAM(s) Oral daily  dextrose 5%. 1000 milliLiter(s) (50 mL/Hr) IV Continuous <Continuous>  dextrose 50% Injectable 12.5 Gram(s) IV Push once  dextrose 50% Injectable 25 Gram(s) IV Push once  dextrose 50% Injectable 25 Gram(s) IV Push once  docusate sodium 100 milliGRAM(s) Oral daily  finasteride 5 milliGRAM(s) Oral daily  heparin  Injectable 5000 Unit(s) SubCutaneous every 8 hours  insulin glargine Injectable (LANTUS) 5 Unit(s) SubCutaneous at bedtime  insulin lispro (HumaLOG) corrective regimen sliding scale   SubCutaneous three times a day before meals  insulin lispro (HumaLOG) corrective regimen sliding scale   SubCutaneous at bedtime  insulin lispro Injectable (HumaLOG) 2 Unit(s) SubCutaneous three times a day with meals  metoprolol succinate ER 25 milliGRAM(s) Oral daily  polyethylene glycol 3350 17 Gram(s) Oral daily  senna 2 Tablet(s) Oral at bedtime  tamsulosin 0.4 milliGRAM(s) Oral at bedtime    MEDICATIONS  (PRN):  dextrose 40% Gel 15 Gram(s) Oral once PRN Blood Glucose LESS THAN 70 milliGRAM(s)/deciliter  glucagon  Injectable 1 milliGRAM(s) IntraMuscular once PRN Glucose LESS THAN 70 milligrams/deciliter      Allergies  No Known Allergies    Review of Systems:  Constitutional: No fever  Eyes: No blurry vision  Neuro: No tremors  HEENT: No pain  Cardiovascular: No chest pain, palpitations  Respiratory: No SOB, no cough  GI: No nausea, vomiting, abdominal pain  : No dysuria  Skin: no rash  Psych: no depression  Endocrine: no polyuria, polydipsia  Hem/lymph: no swelling  Osteoporosis: no fractures      PHYSICAL EXAM:  VITALS: T(C): 36.9 (05-10-19 @ 05:39)  T(F): 98.4 (05-10-19 @ 05:39), Max: 98.5 (05-09-19 @ 16:28)  HR: 60 (05-10-19 @ 05:39) (60 - 79)  BP: 138/70 (05-10-19 @ 05:39) (138/70 - 166/80)  RR:  (16 - 16)  SpO2:  (97% - 100%)  Wt(kg): --  GENERAL: NAD, well-groomed, well-developed  EYES: No proptosis,, anicteric  HEENT:  Atraumatic, Normocephalic, moist mucous membranes  SKIN: Dry, intact, No rashes or lesions  PSYCH: Alert and oriented x 3, normal affect, normal mood      POCT Blood Glucose.: 100 mg/dL (05-10-19 @ 12:37)  POCT Blood Glucose.: 93 mg/dL (05-10-19 @ 09:08)  POCT Blood Glucose.: 258 mg/dL (05-09-19 @ 22:19)  POCT Blood Glucose.: 194 mg/dL (05-09-19 @ 16:23)  POCT Blood Glucose.: 263 mg/dL (05-09-19 @ 12:24)  POCT Blood Glucose.: 109 mg/dL (05-09-19 @ 08:47)  POCT Blood Glucose.: 197 mg/dL (05-08-19 @ 21:11)  POCT Blood Glucose.: 124 mg/dL (05-08-19 @ 17:26)  POCT Blood Glucose.: 154 mg/dL (05-08-19 @ 12:42)  POCT Blood Glucose.: 95 mg/dL (05-08-19 @ 08:39)  POCT Blood Glucose.: 172 mg/dL (05-07-19 @ 21:18)  POCT Blood Glucose.: 152 mg/dL (05-07-19 @ 17:37)      05-10    140  |  105  |  23  ----------------------------<  76  4.0   |  28  |  1.80<H>    EGFR if : 39  EGFR if non : 34    Ca    8.6      05-10  Mg     2.3     05-10            Thyroid Function Tests:      Hemoglobin A1C, Whole Blood: 11.2 % <H> [4.0 - 5.6] (05-09-19 @ 06:30)  Hemoglobin A1C, Whole Blood: 11.6 % <H> [4.0 - 5.6] (05-05-19 @ 07:35)

## 2019-05-10 NOTE — PROGRESS NOTE ADULT - ATTENDING COMMENTS
[facility] NEPHROLOGY  Dorian Atkinson M.D.  Rome Castaneda D.O.  Annalise Cervantes M.D.  Lauren Arvizu, MSN, ANP-C    Telephone: [phone]  Facsimile: [phone]    [address] [place] [address]

## 2019-05-10 NOTE — PROVIDER CONTACT NOTE (OTHER) - ACTION/TREATMENT ORDERED:
No interventions at this time
Kelly Coffey, aware. will continue to monitor and follow up with any further instructions given
Tele pa notified BG 59 and on recheck BG 58. 4 ounces of apple juice given to pt and on recheck after 15mins BG 67. Pt given 4 ounces of apple juice and on recheck after 15 mins .
no actions ordered. Neurology recs doing permissive hypertension. Will continue to monitor
tele pa notified. Will cont. to monitor.
Reassess BP in 30 minutes.

## 2019-05-10 NOTE — PROGRESS NOTE ADULT - SUBJECTIVE AND OBJECTIVE BOX
CARDIOLOGY FOLLOW UP NOTE - DR. PAYNE    Subjective:  no cp, sob    PHYSICAL EXAM:  T(C): 36.9 (05-10-19 @ 05:39), Max: 36.9 (05-09-19 @ 16:28)  HR: 60 (05-10-19 @ 05:39) (60 - 79)  BP: 138/70 (05-10-19 @ 05:39) (138/70 - 166/80)  RR: 16 (05-10-19 @ 05:39) (16 - 16)  SpO2: 98% (05-10-19 @ 05:39) (97% - 100%)  Wt(kg): --  I&O's Summary    09 May 2019 07:01  -  10 May 2019 07:00  --------------------------------------------------------  IN: 618 mL / OUT: 350 mL / NET: 268 mL        Appearance: Normal	  Cardiovascular: Normal S1 S2,RRR, No JVD, No murmurs  Respiratory: Lungs clear to auscultation	  Gastrointestinal:  Soft, Non-tender, + BS	  Extremities: Normal range of motion, No clubbing, cyanosis or edema  Vascular: Peripheral pulses palpable 2+ bilaterally    MEDICATIONS  (STANDING):  amLODIPine   Tablet 5 milliGRAM(s) Oral daily  aspirin enteric coated 81 milliGRAM(s) Oral daily  atorvastatin 80 milliGRAM(s) Oral at bedtime  clopidogrel Tablet 75 milliGRAM(s) Oral daily  dextrose 5%. 1000 milliLiter(s) (50 mL/Hr) IV Continuous <Continuous>  dextrose 50% Injectable 12.5 Gram(s) IV Push once  dextrose 50% Injectable 25 Gram(s) IV Push once  dextrose 50% Injectable 25 Gram(s) IV Push once  docusate sodium 100 milliGRAM(s) Oral daily  finasteride 5 milliGRAM(s) Oral daily  heparin  Injectable 5000 Unit(s) SubCutaneous every 8 hours  insulin glargine Injectable (LANTUS) 5 Unit(s) SubCutaneous at bedtime  insulin lispro (HumaLOG) corrective regimen sliding scale   SubCutaneous three times a day before meals  insulin lispro (HumaLOG) corrective regimen sliding scale   SubCutaneous at bedtime  insulin lispro Injectable (HumaLOG) 2 Unit(s) SubCutaneous three times a day with meals  metoprolol succinate ER 25 milliGRAM(s) Oral daily  polyethylene glycol 3350 17 Gram(s) Oral daily  senna 2 Tablet(s) Oral at bedtime  tamsulosin 0.4 milliGRAM(s) Oral at bedtime      TELEMETRY: 	    ECG:  	  RADIOLOGY:   DIAGNOSTIC TESTING:  [ ] Echocardiogram:  [ ] Catheterization:  [ ] Stress Test:    OTHER: 	    LABS:	 	    CARDIAC MARKERS:                                10.1   5.03  )-----------( 158      ( 10 May 2019 06:24 )             31.8     05-10    140  |  105  |  23  ----------------------------<  76  4.0   |  28  |  1.80<H>    Ca    8.6      10 May 2019 06:24  Mg     2.3     05-10      proBNP:     Lipid Profile:   HgA1c:

## 2019-05-10 NOTE — PROGRESS NOTE ADULT - SUBJECTIVE AND OBJECTIVE BOX
Patient is a 84y old  Male who presents with a chief complaint of Stroke (10 May 2019 14:35)      SUBJECTIVE / OVERNIGHT EVENTS:    Events noted.  CONSTITUTIONAL: No fever,  or fatigue  RESPIRATORY: No cough, wheezing,  No shortness of breath  CARDIOVASCULAR: No chest pain, palpitations, dizziness, or leg swelling  GASTROINTESTINAL: No abdominal or epigastric pain. No nausea, vomiting.  NEUROLOGICAL: No headaches,     MEDICATIONS  (STANDING):  amLODIPine   Tablet 5 milliGRAM(s) Oral daily  aspirin enteric coated 81 milliGRAM(s) Oral daily  atorvastatin 80 milliGRAM(s) Oral at bedtime  clopidogrel Tablet 75 milliGRAM(s) Oral daily  dextrose 5%. 1000 milliLiter(s) (50 mL/Hr) IV Continuous <Continuous>  dextrose 50% Injectable 12.5 Gram(s) IV Push once  dextrose 50% Injectable 25 Gram(s) IV Push once  dextrose 50% Injectable 25 Gram(s) IV Push once  docusate sodium 100 milliGRAM(s) Oral daily  finasteride 5 milliGRAM(s) Oral daily  heparin  Injectable 5000 Unit(s) SubCutaneous every 8 hours  insulin glargine Injectable (LANTUS) 5 Unit(s) SubCutaneous at bedtime  insulin lispro (HumaLOG) corrective regimen sliding scale   SubCutaneous three times a day before meals  insulin lispro (HumaLOG) corrective regimen sliding scale   SubCutaneous at bedtime  insulin lispro Injectable (HumaLOG) 2 Unit(s) SubCutaneous three times a day with meals  metoprolol succinate ER 25 milliGRAM(s) Oral daily  polyethylene glycol 3350 17 Gram(s) Oral daily  senna 2 Tablet(s) Oral at bedtime  tamsulosin 0.4 milliGRAM(s) Oral at bedtime    MEDICATIONS  (PRN):  dextrose 40% Gel 15 Gram(s) Oral once PRN Blood Glucose LESS THAN 70 milliGRAM(s)/deciliter  glucagon  Injectable 1 milliGRAM(s) IntraMuscular once PRN Glucose LESS THAN 70 milligrams/deciliter        CAPILLARY BLOOD GLUCOSE      POCT Blood Glucose.: 149 mg/dL (10 May 2019 21:34)  POCT Blood Glucose.: 149 mg/dL (10 May 2019 17:22)  POCT Blood Glucose.: 100 mg/dL (10 May 2019 12:37)  POCT Blood Glucose.: 93 mg/dL (10 May 2019 09:08)    I&O's Summary    09 May 2019 07:01  -  10 May 2019 07:00  --------------------------------------------------------  IN: 618 mL / OUT: 350 mL / NET: 268 mL        PHYSICAL EXAM:  GENERAL: NAD  NECK: Supple, No JVD  CHEST/LUNG: Clear to auscultation bilaterally; No wheezing.  HEART: Regular rate and rhythm; No murmurs, rubs, or gallops  ABDOMEN: Soft, Nontender, Nondistended; Bowel sounds present  EXTREMITIES:   No edema  NEUROLOGY: AAO X 3      LABS:                        10.1   5.03  )-----------( 158      ( 10 May 2019 06:24 )             31.8     0510    140  |  105  |  23  ----------------------------<  76  4.0   |  28  |  1.80<H>    Ca    8.6      10 May 2019 06:24  Mg     2.3     0510            Urinalysis Basic - ( 09 May 2019 21:20 )    Color: LIGHT YELLOW / Appearance: CLEAR / S.013 / pH: 6.0  Gluc: 100 / Ketone: NEGATIVE  / Bili: NEGATIVE / Urobili: NORMAL   Blood: TRACE / Protein: 200 / Nitrite: NEGATIVE   Leuk Esterase: NEGATIVE / RBC: 0-2 / WBC 0-2   Sq Epi: OCC / Non Sq Epi: x / Bacteria: NEGATIVE      CAPILLARY BLOOD GLUCOSE      POCT Blood Glucose.: 149 mg/dL (10 May 2019 21:34)  POCT Blood Glucose.: 149 mg/dL (10 May 2019 17:22)  POCT Blood Glucose.: 100 mg/dL (10 May 2019 12:37)  POCT Blood Glucose.: 93 mg/dL (10 May 2019 09:08)     @ 05:37  Culture-urine   NO GROWTH AT 24 HOURS  Culture results --  method type --  Organism --  Organism Identification --  Specimen source URINE MIDSTREAM            @ 05:37  Culture blood --  Culture results --  Gram stain --  Gram stain blood --  Method type --  Organism --  Organism identification --  Specimen source URINE MIDSTREAM      RADIOLOGY & ADDITIONAL TESTS:    Imaging Personally Reviewed:    Consultant(s) Notes Reviewed:      Care Discussed with Consultants/Other Providers:

## 2019-05-11 LAB
ANION GAP SERPL CALC-SCNC: 7 MMO/L — SIGNIFICANT CHANGE UP (ref 7–14)
BASOPHILS # BLD AUTO: 0.04 K/UL — SIGNIFICANT CHANGE UP (ref 0–0.2)
BASOPHILS NFR BLD AUTO: 0.8 % — SIGNIFICANT CHANGE UP (ref 0–2)
BUN SERPL-MCNC: 23 MG/DL — SIGNIFICANT CHANGE UP (ref 7–23)
CALCIUM SERPL-MCNC: 8.5 MG/DL — SIGNIFICANT CHANGE UP (ref 8.4–10.5)
CHLORIDE SERPL-SCNC: 105 MMOL/L — SIGNIFICANT CHANGE UP (ref 98–107)
CO2 SERPL-SCNC: 26 MMOL/L — SIGNIFICANT CHANGE UP (ref 22–31)
CREAT SERPL-MCNC: 1.64 MG/DL — HIGH (ref 0.5–1.3)
EOSINOPHIL # BLD AUTO: 0.26 K/UL — SIGNIFICANT CHANGE UP (ref 0–0.5)
EOSINOPHIL NFR BLD AUTO: 5.5 % — SIGNIFICANT CHANGE UP (ref 0–6)
GLUCOSE SERPL-MCNC: 106 MG/DL — HIGH (ref 70–99)
HCT VFR BLD CALC: 34 % — LOW (ref 39–50)
HGB BLD-MCNC: 10.5 G/DL — LOW (ref 13–17)
IMM GRANULOCYTES NFR BLD AUTO: 0.2 % — SIGNIFICANT CHANGE UP (ref 0–1.5)
LYMPHOCYTES # BLD AUTO: 1.56 K/UL — SIGNIFICANT CHANGE UP (ref 1–3.3)
LYMPHOCYTES # BLD AUTO: 32.9 % — SIGNIFICANT CHANGE UP (ref 13–44)
MAGNESIUM SERPL-MCNC: 2.1 MG/DL — SIGNIFICANT CHANGE UP (ref 1.6–2.6)
MCHC RBC-ENTMCNC: 26.1 PG — LOW (ref 27–34)
MCHC RBC-ENTMCNC: 30.9 % — LOW (ref 32–36)
MCV RBC AUTO: 84.4 FL — SIGNIFICANT CHANGE UP (ref 80–100)
MONOCYTES # BLD AUTO: 0.47 K/UL — SIGNIFICANT CHANGE UP (ref 0–0.9)
MONOCYTES NFR BLD AUTO: 9.9 % — SIGNIFICANT CHANGE UP (ref 2–14)
NEUTROPHILS # BLD AUTO: 2.4 K/UL — SIGNIFICANT CHANGE UP (ref 1.8–7.4)
NEUTROPHILS NFR BLD AUTO: 50.7 % — SIGNIFICANT CHANGE UP (ref 43–77)
NRBC # FLD: 0 K/UL — SIGNIFICANT CHANGE UP (ref 0–0)
PLATELET # BLD AUTO: 152 K/UL — SIGNIFICANT CHANGE UP (ref 150–400)
PMV BLD: 10.1 FL — SIGNIFICANT CHANGE UP (ref 7–13)
POTASSIUM SERPL-MCNC: 4 MMOL/L — SIGNIFICANT CHANGE UP (ref 3.5–5.3)
POTASSIUM SERPL-SCNC: 4 MMOL/L — SIGNIFICANT CHANGE UP (ref 3.5–5.3)
RBC # BLD: 4.03 M/UL — LOW (ref 4.2–5.8)
RBC # FLD: 13.2 % — SIGNIFICANT CHANGE UP (ref 10.3–14.5)
SODIUM SERPL-SCNC: 138 MMOL/L — SIGNIFICANT CHANGE UP (ref 135–145)
WBC # BLD: 4.74 K/UL — SIGNIFICANT CHANGE UP (ref 3.8–10.5)
WBC # FLD AUTO: 4.74 K/UL — SIGNIFICANT CHANGE UP (ref 3.8–10.5)

## 2019-05-11 RX ORDER — HYDRALAZINE HCL 50 MG
2.5 TABLET ORAL ONCE
Refills: 0 | Status: DISCONTINUED | OUTPATIENT
Start: 2019-05-11 | End: 2019-05-11

## 2019-05-11 RX ADMIN — Medication 25 MILLIGRAM(S): at 12:20

## 2019-05-11 RX ADMIN — Medication 1: at 12:48

## 2019-05-11 RX ADMIN — INSULIN GLARGINE 5 UNIT(S): 100 INJECTION, SOLUTION SUBCUTANEOUS at 22:09

## 2019-05-11 RX ADMIN — Medication 2 UNIT(S): at 12:48

## 2019-05-11 RX ADMIN — AMLODIPINE BESYLATE 5 MILLIGRAM(S): 2.5 TABLET ORAL at 05:01

## 2019-05-11 RX ADMIN — Medication 2 UNIT(S): at 08:46

## 2019-05-11 RX ADMIN — SENNA PLUS 2 TABLET(S): 8.6 TABLET ORAL at 22:10

## 2019-05-11 RX ADMIN — Medication 2 UNIT(S): at 18:09

## 2019-05-11 RX ADMIN — Medication 100 MILLIGRAM(S): at 12:20

## 2019-05-11 RX ADMIN — FINASTERIDE 5 MILLIGRAM(S): 5 TABLET, FILM COATED ORAL at 12:20

## 2019-05-11 RX ADMIN — ATORVASTATIN CALCIUM 80 MILLIGRAM(S): 80 TABLET, FILM COATED ORAL at 22:09

## 2019-05-11 RX ADMIN — Medication 81 MILLIGRAM(S): at 12:20

## 2019-05-11 RX ADMIN — CLOPIDOGREL BISULFATE 75 MILLIGRAM(S): 75 TABLET, FILM COATED ORAL at 12:20

## 2019-05-11 RX ADMIN — TAMSULOSIN HYDROCHLORIDE 0.4 MILLIGRAM(S): 0.4 CAPSULE ORAL at 22:09

## 2019-05-11 NOTE — PROGRESS NOTE ADULT - SUBJECTIVE AND OBJECTIVE BOX
CC: no new complaints    TELEMETRY:     PHYSICAL EXAM:    T(C): 36.7 (05-11-19 @ 04:56), Max: 37.1 (05-10-19 @ 15:04)  HR: 54 (05-11-19 @ 04:56) (54 - 82)  BP: 154/77 (05-11-19 @ 04:56) (140/54 - 173/80)  RR: 16 (05-11-19 @ 04:56) (14 - 16)  SpO2: 100% (05-11-19 @ 04:56) (100% - 100%)  Wt(kg): --  I&O's Summary      Appearance: Normal	  Cardiovascular: Normal S1 S2,RRR, No JVD, No murmurs  Respiratory: Lungs clear to auscultation	  Gastrointestinal:  Soft, Non-tender, + BS	  Extremities: Normal range of motion, No clubbing, cyanosis or edema  Vascular: Peripheral pulses palpable 2+ bilaterally     LABS:	 	                          10.5   4.74  )-----------( 152      ( 11 May 2019 07:10 )             34.0     05-11    138  |  105  |  23  ----------------------------<  106<H>  4.0   |  26  |  1.64<H>    Ca    8.5      11 May 2019 07:10  Mg     2.1     05-11            CARDIAC MARKERS:

## 2019-05-11 NOTE — PROGRESS NOTE ADULT - ASSESSMENT
· Assessment	  84 year old male with htn, CAD s/p CABG, post op Afib, DM, dementia presenting with COOK, chronic diastolic chf p/w atypical CP/weakness and evidence of poorly controlled diabetes    Uncontrolled DM II:  FSSS  Endo f/up  Lantus/Lispro    Acute Rt pontine infarct:  MRI brain reviewed.  Neuro f/up noted.  CTA head/Neck: Multiple extracranial and intracranial stenosis.    Rt ICA stenosis:  Vascular f/up noted: No surgical intervention    A Fib/CAD:  Tele  Cardio f/up noted.    HTN:  Cont current BP meds    Dc planning  Dw pt's wife.

## 2019-05-11 NOTE — PROGRESS NOTE ADULT - SUBJECTIVE AND OBJECTIVE BOX
Patient is a 84y old  Male who presents with a chief complaint of Stroke (12 May 2019 00:05)      SUBJECTIVE / OVERNIGHT EVENTS:    Events noted.  CONSTITUTIONAL: No fever,  or fatigue  RESPIRATORY: No cough, wheezing,  No shortness of breath  CARDIOVASCULAR: No chest pain, palpitations, dizziness, or leg swelling  GASTROINTESTINAL: No abdominal or epigastric pain. No nausea, vomiting.  NEUROLOGICAL: No headaches,     MEDICATIONS  (STANDING):  amLODIPine   Tablet 5 milliGRAM(s) Oral daily  aspirin enteric coated 81 milliGRAM(s) Oral daily  atorvastatin 80 milliGRAM(s) Oral at bedtime  clopidogrel Tablet 75 milliGRAM(s) Oral daily  dextrose 5%. 1000 milliLiter(s) (50 mL/Hr) IV Continuous <Continuous>  dextrose 50% Injectable 12.5 Gram(s) IV Push once  dextrose 50% Injectable 25 Gram(s) IV Push once  dextrose 50% Injectable 25 Gram(s) IV Push once  docusate sodium 100 milliGRAM(s) Oral daily  finasteride 5 milliGRAM(s) Oral daily  heparin  Injectable 5000 Unit(s) SubCutaneous every 8 hours  insulin glargine Injectable (LANTUS) 5 Unit(s) SubCutaneous at bedtime  insulin lispro (HumaLOG) corrective regimen sliding scale   SubCutaneous three times a day before meals  insulin lispro (HumaLOG) corrective regimen sliding scale   SubCutaneous at bedtime  insulin lispro Injectable (HumaLOG) 2 Unit(s) SubCutaneous three times a day with meals  metoprolol succinate ER 25 milliGRAM(s) Oral daily  polyethylene glycol 3350 17 Gram(s) Oral daily  senna 2 Tablet(s) Oral at bedtime  tamsulosin 0.4 milliGRAM(s) Oral at bedtime    MEDICATIONS  (PRN):  dextrose 40% Gel 15 Gram(s) Oral once PRN Blood Glucose LESS THAN 70 milliGRAM(s)/deciliter  glucagon  Injectable 1 milliGRAM(s) IntraMuscular once PRN Glucose LESS THAN 70 milligrams/deciliter        CAPILLARY BLOOD GLUCOSE      POCT Blood Glucose.: 221 mg/dL (11 May 2019 21:19)  POCT Blood Glucose.: 100 mg/dL (11 May 2019 17:20)  POCT Blood Glucose.: 169 mg/dL (11 May 2019 12:39)  POCT Blood Glucose.: 98 mg/dL (11 May 2019 08:20)    I&O's Summary      PHYSICAL EXAM:  GENERAL: NAD  NECK: Supple, No JVD  CHEST/LUNG: Clear to auscultation bilaterally; No wheezing.  HEART: Regular rate and rhythm; No murmurs, rubs, or gallops  ABDOMEN: Soft, Nontender, Nondistended; Bowel sounds present  EXTREMITIES:   No edema  NEUROLOGY: AAO X 3      LABS:                        10.5   4.74  )-----------( 152      ( 11 May 2019 07:10 )             34.0     05-11    138  |  105  |  23  ----------------------------<  106<H>  4.0   |  26  |  1.64<H>    Ca    8.5      11 May 2019 07:10  Mg     2.1     05-11              CAPILLARY BLOOD GLUCOSE      POCT Blood Glucose.: 221 mg/dL (11 May 2019 21:19)  POCT Blood Glucose.: 100 mg/dL (11 May 2019 17:20)  POCT Blood Glucose.: 169 mg/dL (11 May 2019 12:39)  POCT Blood Glucose.: 98 mg/dL (11 May 2019 08:20)    05-05 @ 05:37  Culture-urine   NO GROWTH AT 24 HOURS  Culture results --  method type --  Organism --  Organism Identification --  Specimen source URINE MIDSTREAM           05-05 @ 05:37  Culture blood --  Culture results --  Gram stain --  Gram stain blood --  Method type --  Organism --  Organism identification --  Specimen source URINE MIDSTREAM      RADIOLOGY & ADDITIONAL TESTS:    Imaging Personally Reviewed:    Consultant(s) Notes Reviewed:      Care Discussed with Consultants/Other Providers:

## 2019-05-12 PROBLEM — E11.9 TYPE 2 DIABETES MELLITUS WITHOUT COMPLICATIONS: Chronic | Status: ACTIVE | Noted: 2019-05-05

## 2019-05-12 LAB
ANION GAP SERPL CALC-SCNC: 9 MMO/L — SIGNIFICANT CHANGE UP (ref 7–14)
BASOPHILS # BLD AUTO: 0.04 K/UL — SIGNIFICANT CHANGE UP (ref 0–0.2)
BASOPHILS NFR BLD AUTO: 0.8 % — SIGNIFICANT CHANGE UP (ref 0–2)
BUN SERPL-MCNC: 24 MG/DL — HIGH (ref 7–23)
CALCIUM SERPL-MCNC: 8.2 MG/DL — LOW (ref 8.4–10.5)
CHLORIDE SERPL-SCNC: 105 MMOL/L — SIGNIFICANT CHANGE UP (ref 98–107)
CO2 SERPL-SCNC: 24 MMOL/L — SIGNIFICANT CHANGE UP (ref 22–31)
CREAT SERPL-MCNC: 1.51 MG/DL — HIGH (ref 0.5–1.3)
EOSINOPHIL # BLD AUTO: 0.27 K/UL — SIGNIFICANT CHANGE UP (ref 0–0.5)
EOSINOPHIL NFR BLD AUTO: 5.5 % — SIGNIFICANT CHANGE UP (ref 0–6)
GLUCOSE SERPL-MCNC: 81 MG/DL — SIGNIFICANT CHANGE UP (ref 70–99)
HCT VFR BLD CALC: 32.8 % — LOW (ref 39–50)
HGB BLD-MCNC: 10.2 G/DL — LOW (ref 13–17)
IMM GRANULOCYTES NFR BLD AUTO: 0.2 % — SIGNIFICANT CHANGE UP (ref 0–1.5)
LYMPHOCYTES # BLD AUTO: 1.64 K/UL — SIGNIFICANT CHANGE UP (ref 1–3.3)
LYMPHOCYTES # BLD AUTO: 33.4 % — SIGNIFICANT CHANGE UP (ref 13–44)
MAGNESIUM SERPL-MCNC: 2.1 MG/DL — SIGNIFICANT CHANGE UP (ref 1.6–2.6)
MCHC RBC-ENTMCNC: 25.8 PG — LOW (ref 27–34)
MCHC RBC-ENTMCNC: 31.1 % — LOW (ref 32–36)
MCV RBC AUTO: 82.8 FL — SIGNIFICANT CHANGE UP (ref 80–100)
MONOCYTES # BLD AUTO: 0.52 K/UL — SIGNIFICANT CHANGE UP (ref 0–0.9)
MONOCYTES NFR BLD AUTO: 10.6 % — SIGNIFICANT CHANGE UP (ref 2–14)
NEUTROPHILS # BLD AUTO: 2.43 K/UL — SIGNIFICANT CHANGE UP (ref 1.8–7.4)
NEUTROPHILS NFR BLD AUTO: 49.5 % — SIGNIFICANT CHANGE UP (ref 43–77)
NRBC # FLD: 0 K/UL — SIGNIFICANT CHANGE UP (ref 0–0)
PLATELET # BLD AUTO: 156 K/UL — SIGNIFICANT CHANGE UP (ref 150–400)
PMV BLD: 9.5 FL — SIGNIFICANT CHANGE UP (ref 7–13)
POTASSIUM SERPL-MCNC: 3.7 MMOL/L — SIGNIFICANT CHANGE UP (ref 3.5–5.3)
POTASSIUM SERPL-SCNC: 3.7 MMOL/L — SIGNIFICANT CHANGE UP (ref 3.5–5.3)
RBC # BLD: 3.96 M/UL — LOW (ref 4.2–5.8)
RBC # FLD: 13.2 % — SIGNIFICANT CHANGE UP (ref 10.3–14.5)
SODIUM SERPL-SCNC: 138 MMOL/L — SIGNIFICANT CHANGE UP (ref 135–145)
WBC # BLD: 4.91 K/UL — SIGNIFICANT CHANGE UP (ref 3.8–10.5)
WBC # FLD AUTO: 4.91 K/UL — SIGNIFICANT CHANGE UP (ref 3.8–10.5)

## 2019-05-12 PROCEDURE — 99233 SBSQ HOSP IP/OBS HIGH 50: CPT

## 2019-05-12 RX ORDER — INSULIN GLARGINE 100 [IU]/ML
3 INJECTION, SOLUTION SUBCUTANEOUS AT BEDTIME
Refills: 0 | Status: DISCONTINUED | OUTPATIENT
Start: 2019-05-12 | End: 2019-05-13

## 2019-05-12 RX ADMIN — Medication 1: at 18:09

## 2019-05-12 RX ADMIN — FINASTERIDE 5 MILLIGRAM(S): 5 TABLET, FILM COATED ORAL at 12:17

## 2019-05-12 RX ADMIN — Medication 81 MILLIGRAM(S): at 12:17

## 2019-05-12 RX ADMIN — SENNA PLUS 2 TABLET(S): 8.6 TABLET ORAL at 21:33

## 2019-05-12 RX ADMIN — Medication 2 UNIT(S): at 13:09

## 2019-05-12 RX ADMIN — INSULIN GLARGINE 3 UNIT(S): 100 INJECTION, SOLUTION SUBCUTANEOUS at 21:36

## 2019-05-12 RX ADMIN — Medication 25 MILLIGRAM(S): at 04:53

## 2019-05-12 RX ADMIN — CLOPIDOGREL BISULFATE 75 MILLIGRAM(S): 75 TABLET, FILM COATED ORAL at 12:17

## 2019-05-12 RX ADMIN — TAMSULOSIN HYDROCHLORIDE 0.4 MILLIGRAM(S): 0.4 CAPSULE ORAL at 21:33

## 2019-05-12 RX ADMIN — ATORVASTATIN CALCIUM 80 MILLIGRAM(S): 80 TABLET, FILM COATED ORAL at 21:33

## 2019-05-12 RX ADMIN — AMLODIPINE BESYLATE 5 MILLIGRAM(S): 2.5 TABLET ORAL at 04:53

## 2019-05-12 RX ADMIN — Medication 2 UNIT(S): at 09:03

## 2019-05-12 RX ADMIN — Medication 2 UNIT(S): at 18:09

## 2019-05-12 NOTE — PROGRESS NOTE ADULT - SUBJECTIVE AND OBJECTIVE BOX
[facility] NEPHROLOGY- PROGRESS NOTE    84y Male with history of HTN, DM presents with acute CVA. Nephrology consulted for elevated Scr.  Pt feels well.    REVIEW OF SYSTEMS:  Gen: no changes in weight  Cards: no chest pain  Resp: no dyspnea  GI: no nausea or vomiting or diarrhea  Vascular: no LE edema    No Known Allergies      Hospital Medications: Medications reviewed    VITALS:  T(F): 98.6 (19 @ 22:07), Max: 98.6 (19 @ 22:07)  HR: 57 (19 @ 22:07)  BP: 167/77 (19 @ 22:07)  RR: 16 (19 @ 22:07)  SpO2: 100% (19 @ 22:07)  Wt(kg): --    PHYSICAL EXAM:  Gen: NAD, calm  Cards: RRR, +S1/S2, + FERNANDEZ  Resp: CTA B/L  GI: soft, NT/ND, NABS  Vascular: no LE edema B/L      LABS:      138  |  105  |  23  ----------------------------<  106<H>  4.0   |  26  |  1.64<H>    Ca    8.5      11 May 2019 07:10  Mg     2.1           Creatinine Trend: 1.64 <--, 1.80 <--, 1.72 <--, 1.51 <--, 1.36 <--, 1.44 <--, 1.78 <--                        10.5   4.74  )-----------( 152      ( 11 May 2019 07:10 )             34.0     Urine Studies:  Urinalysis Basic - ( 09 May 2019 21:20 )    Color: LIGHT YELLOW / Appearance: CLEAR / S.013 / pH: 6.0  Gluc: 100 / Ketone: NEGATIVE  / Bili: NEGATIVE / Urobili: NORMAL   Blood: TRACE / Protein: 200 / Nitrite: NEGATIVE   Leuk Esterase: NEGATIVE / RBC: 0-2 / WBC 0-2   Sq Epi: OCC / Non Sq Epi:  / Bacteria: NEGATIVE      Sodium, Random Urine: 45 mmol/L ( @ 21:20)  Creatinine, Random Urine: 71.50 mg/dL ( @ 21:20) [facility] NEPHROLOGY- PROGRESS NOTE-  Pt interviewed and examined at 10pm on 19 and note reflects E&M services provided at that time.    84y Male with history of HTN, DM presents with acute CVA. Nephrology consulted for elevated Scr.  Pt feels well.    REVIEW OF SYSTEMS:  Gen: no changes in weight  Cards: no chest pain  Resp: no dyspnea  GI: no nausea or vomiting or diarrhea  Vascular: no LE edema    No Known Allergies      Hospital Medications: Medications reviewed    VITALS:  T(F): 98.6 (19 @ 22:07), Max: 98.6 (19 @ 22:07)  HR: 57 (19 @ 22:07)  BP: 167/77 (19 @ 22:07)  RR: 16 (19 @ 22:07)  SpO2: 100% (19 @ 22:07)  Wt(kg): --    PHYSICAL EXAM:  Gen: NAD, calm  Cards: RRR, +S1/S2, + FERNANDEZ  Resp: CTA B/L  GI: soft, NT/ND, NABS  Vascular: no LE edema B/L      LABS:      138  |  105  |  23  ----------------------------<  106<H>  4.0   |  26  |  1.64<H>    Ca    8.5      11 May 2019 07:10  Mg     2.1           Creatinine Trend: 1.64 <--, 1.80 <--, 1.72 <--, 1.51 <--, 1.36 <--, 1.44 <--, 1.78 <--                        10.5   4.74  )-----------( 152      ( 11 May 2019 07:10 )             34.0     Urine Studies:  Urinalysis Basic - ( 09 May 2019 21:20 )    Color: LIGHT YELLOW / Appearance: CLEAR / S.013 / pH: 6.0  Gluc: 100 / Ketone: NEGATIVE  / Bili: NEGATIVE / Urobili: NORMAL   Blood: TRACE / Protein: 200 / Nitrite: NEGATIVE   Leuk Esterase: NEGATIVE / RBC: 0-2 / WBC 0-2   Sq Epi: OCC / Non Sq Epi:  / Bacteria: NEGATIVE      Sodium, Random Urine: 45 mmol/L ( @ 21:20)  Creatinine, Random Urine: 71.50 mg/dL ( @ 21:20) [facility] NEPHROLOGY- PROGRESS NOTE-  Pt interviewed and examined at 10pm on 19 and note reflects E&M services provided at that time.    84y Male with history of HTN, DM presents with acute CVA. Nephrology consulted for elevated Scr.  Pt feels well, no complaints    REVIEW OF SYSTEMS:  Gen: no changes in weight  Cards: no chest pain  Resp: no dyspnea  GI: no nausea or vomiting or diarrhea  Vascular: no LE edema    No Known Allergies      Hospital Medications: Medications reviewed    VITALS:  T(F): 98.5 (19 @ 15:23), Max: 98.6 (19 @ 22:07)  HR: 63 (19 @ 15:23)  BP: 138/66 (19 @ 15:23)  RR: 16 (19 @ 15:23)  SpO2: 98% (19 @ 15:23)  Wt(kg): --     @ 07:01  -   @ 21:05  --------------------------------------------------------  IN: 237 mL / OUT: 400 mL / NET: -163 mL    PHYSICAL EXAM:  Gen: NAD, calm  Cards: RRR, +S1/S2, + FERNANDEZ  Resp: CTA B/L  GI: soft, NT/ND, NABS  Vascular: no LE edema B/L        LABS:      138  |  105  |  24<H>  ----------------------------<  81  3.7   |  24  |  1.51<H>    Ca    8.2<L>      12 May 2019 04:48  Mg     2.1           Creatinine Trend: 1.51 <--, 1.64 <--, 1.80 <--, 1.72 <--, 1.51 <--, 1.36 <--, 1.44 <--                        10.2   4.91  )-----------( 156      ( 12 May 2019 04:48 )             32.8     Urine Studies:  Urinalysis Basic - ( 09 May 2019 21:20 )    Color: LIGHT YELLOW / Appearance: CLEAR / S.013 / pH: 6.0  Gluc: 100 / Ketone: NEGATIVE  / Bili: NEGATIVE / Urobili: NORMAL   Blood: TRACE / Protein: 200 / Nitrite: NEGATIVE   Leuk Esterase: NEGATIVE / RBC: 0-2 / WBC 0-2   Sq Epi: OCC / Non Sq Epi:  / Bacteria: NEGATIVE      Sodium, Random Urine: 45 mmol/L ( @ 21:20)  Creatinine, Random Urine: 71.50 mg/dL ( @ 21:20)

## 2019-05-12 NOTE — PROGRESS NOTE ADULT - ASSESSMENT
84y Male with history of HTN, DM presents with acute CVA. Nephrology consulted for elevated Scr.    1) TAINA; Likely secondary to contrast nephropathy given CT with contrast on 5/7/19. UA bland with low FeNa consistent with diagnosis. Renal fxn improving. Continue with supportive care. Avoid nephrotoxins.    2) CKD-3: With significant proteinuria on UA secondary to DM. Baseline Scr 1.5 as per prior records. Patient advised to f/u as an outpatient for CKD work up including renal US. Monitor electrolytes.    3) HTN with CKD: BP improving. Goal BP as per neuro given recent CVA and carotid artery stenosis. Monitor BP.    3) LE edema: Patient appears euvolemic with mod-severe MR on recent TTE and normal LVSF. Holding lasix 20 mg daily for now. Monitor UO.      No renal objection to discharge but would hold home lisinopril and lasix and have patient repeat renal panel next week to monitor for resolution of TAINA.

## 2019-05-12 NOTE — PROGRESS NOTE ADULT - SUBJECTIVE AND OBJECTIVE BOX
Patient is a 84y old  Male who presents with a chief complaint of Stroke (12 May 2019 15:41)      SUBJECTIVE / OVERNIGHT EVENTS:    Events noted.  CONSTITUTIONAL: No fever,  or fatigue  RESPIRATORY: No cough, wheezing,    CARDIOVASCULAR: No chest pain, palpitations,   GASTROINTESTINAL: No abdominal or epigastric pain.   NEUROLOGICAL: No headaches,     MEDICATIONS  (STANDING):  amLODIPine   Tablet 5 milliGRAM(s) Oral daily  aspirin enteric coated 81 milliGRAM(s) Oral daily  atorvastatin 80 milliGRAM(s) Oral at bedtime  clopidogrel Tablet 75 milliGRAM(s) Oral daily  dextrose 5%. 1000 milliLiter(s) (50 mL/Hr) IV Continuous <Continuous>  dextrose 50% Injectable 12.5 Gram(s) IV Push once  dextrose 50% Injectable 25 Gram(s) IV Push once  dextrose 50% Injectable 25 Gram(s) IV Push once  docusate sodium 100 milliGRAM(s) Oral daily  finasteride 5 milliGRAM(s) Oral daily  heparin  Injectable 5000 Unit(s) SubCutaneous every 8 hours  insulin glargine Injectable (LANTUS) 3 Unit(s) SubCutaneous at bedtime  insulin lispro (HumaLOG) corrective regimen sliding scale   SubCutaneous three times a day before meals  insulin lispro (HumaLOG) corrective regimen sliding scale   SubCutaneous at bedtime  insulin lispro Injectable (HumaLOG) 2 Unit(s) SubCutaneous three times a day with meals  metoprolol succinate ER 25 milliGRAM(s) Oral daily  polyethylene glycol 3350 17 Gram(s) Oral daily  senna 2 Tablet(s) Oral at bedtime  tamsulosin 0.4 milliGRAM(s) Oral at bedtime    MEDICATIONS  (PRN):  dextrose 40% Gel 15 Gram(s) Oral once PRN Blood Glucose LESS THAN 70 milliGRAM(s)/deciliter  glucagon  Injectable 1 milliGRAM(s) IntraMuscular once PRN Glucose LESS THAN 70 milligrams/deciliter        CAPILLARY BLOOD GLUCOSE      POCT Blood Glucose.: 225 mg/dL (12 May 2019 21:36)  POCT Blood Glucose.: 154 mg/dL (12 May 2019 17:35)  POCT Blood Glucose.: 136 mg/dL (12 May 2019 13:04)  POCT Blood Glucose.: 76 mg/dL (12 May 2019 08:50)    I&O's Summary    12 May 2019 07:01  -  12 May 2019 22:20  --------------------------------------------------------  IN: 237 mL / OUT: 400 mL / NET: -163 mL        PHYSICAL EXAM:    NECK: Supple, No JVD  CHEST/LUNG: Clear to auscultation bilaterally; No wheezing.  HEART: Regular rate and rhythm; No murmurs, rubs, or gallops  ABDOMEN: Soft, Nontender, Nondistended; Bowel sounds present  EXTREMITIES:   No edema  NEUROLOGY: AAO       LABS:                        10.2   4.91  )-----------( 156      ( 12 May 2019 04:48 )             32.8     05-12    138  |  105  |  24<H>  ----------------------------<  81  3.7   |  24  |  1.51<H>    Ca    8.2<L>      12 May 2019 04:48  Mg     2.1     05-12              CAPILLARY BLOOD GLUCOSE      POCT Blood Glucose.: 225 mg/dL (12 May 2019 21:36)  POCT Blood Glucose.: 154 mg/dL (12 May 2019 17:35)  POCT Blood Glucose.: 136 mg/dL (12 May 2019 13:04)  POCT Blood Glucose.: 76 mg/dL (12 May 2019 08:50)                RADIOLOGY & ADDITIONAL TESTS:    Imaging Personally Reviewed:    Consultant(s) Notes Reviewed:      Care Discussed with Consultants/Other Providers:

## 2019-05-12 NOTE — PROGRESS NOTE ADULT - ASSESSMENT
85 yo man with uncontrolled Type 2 DM (A1C 11.6), HTN, HLD, CAD, s/p CABG x3 p/w generalized weakness and slurred speech. Dx with acute CVA. Endocrine consulted for uncontrolled Type 2 DM.

## 2019-05-12 NOTE — PROGRESS NOTE ADULT - ASSESSMENT
84 year old male with htn, CAD s/p CABG, post op Afib, DM, dementia presenting with COOK, chronic diastolic chf p/w atypical CP/weakness, slurred speech,  and evidence of poorly controlled diabetes    1. Acute CVA  -likely atheroembolic etiology of his stroke  -Aspirin (81 mg once a day) and clopidogrel (75 mg once a day) for aggressive secondary stroke prevention x 3 months followed by aspirin 81 mg once a day indefinitely  -MRA w high grade stenosis of MARKO, CTA wih multiple intracranial and extracranial stenoses, Right pontine infarct again noted w/o hemorrhagic transformation   -continue to treat this ICA stenosis medically rather than consider surgical revascularization at this time per neuro   --high dose statin   -tte normal lv, bubble study nl   -no evidence of Afib/ aflutter on tele   -s/p ilr    2. Acute/chronic diastolic CHF  -volume status stable  -hold lasix for now, restart omorrow  -cont toprol 25 daily, cont to monitor for alina, pauses     3. MR  -severe mr on tte today   -continue medical management for now, not candidate for any inv therapy currently in light of acute cva     4.  CAD s/p CABG  -stable   -continue statin     5 Afib, hx    no further reoccurrence,  remains in SR   continue ASA     6. HTN   restart lisinopril once creat better    7. ckd  renal f/u          dvt ppx

## 2019-05-12 NOTE — PROGRESS NOTE ADULT - PROBLEM SELECTOR PLAN 1
-Goal -180  -Reduce Lantus to 3 units QHS as fasting FS 73 in this elderly male  -cont. humalog 2-2-2  -cont. low dose SS TIDAC/qhs  -Cont. to monitor FS's TIDAC/qhs  -consistent carbohydrate diet    discharge: his A1c is poorly controlled while in the hospitalize he requires very small doses of insulin.  This may be due to dietary non-adherence at home  -discharge: Recommend Januvia 50 mg QD or Tradjenta 5 mg QD. Please verify no history of pancreatitis. C-peptide was normal on May 9, 2019

## 2019-05-12 NOTE — PROGRESS NOTE ADULT - PROBLEM SELECTOR PLAN 2
Cont. current regimen, adjust as needed.
Cont. current regimen, adjust as needed.
I have personally  seen and examined the patient today and have noted the findings and formulated the plan of care.

## 2019-05-12 NOTE — PROGRESS NOTE ADULT - ATTENDING COMMENTS
[facility] NEPHROLOGY  Dorian Atkinson M.D.  Rome Castaneda D.O.  Annalise Cervantes M.D.  Lauren Arvizu, MSN, ANP-C    Telephone: [phone]  Facsimile: [phone]    [address] [place] [address]

## 2019-05-12 NOTE — PROGRESS NOTE ADULT - ASSESSMENT
· Assessment	  84 year old male with htn, CAD s/p CABG, post op Afib, DM, dementia presenting with COOK, chronic diastolic chf p/w atypical CP/weakness and evidence of poorly controlled diabetes    Uncontrolled DM II:  FSSS  Endo f/up  Lantus/Lispro    Acute Rt pontine infarct:  MRI brain reviewed.  Neuro f/up noted.  CTA head/Neck: Multiple extracranial and intracranial stenosis.    Rt ICA stenosis:  Vascular f/up noted: No surgical intervention    A Fib/CAD:  Tele  Cardio f/up noted.    HTN:  Cont current BP meds    Dc planning

## 2019-05-12 NOTE — PROGRESS NOTE ADULT - SUBJECTIVE AND OBJECTIVE BOX
Chief Complaint/Follow-up on: T2DM    Subjective: 83 y/o male with T2DM and acute CVA. Feeling well. No complaints. No s/s with FS 76. Awaiting placement to rehab. Family at bedside    MEDICATIONS  (STANDING):  amLODIPine   Tablet 5 milliGRAM(s) Oral daily  aspirin enteric coated 81 milliGRAM(s) Oral daily  atorvastatin 80 milliGRAM(s) Oral at bedtime  clopidogrel Tablet 75 milliGRAM(s) Oral daily  dextrose 5%. 1000 milliLiter(s) (50 mL/Hr) IV Continuous <Continuous>  dextrose 50% Injectable 12.5 Gram(s) IV Push once  dextrose 50% Injectable 25 Gram(s) IV Push once  dextrose 50% Injectable 25 Gram(s) IV Push once  docusate sodium 100 milliGRAM(s) Oral daily  finasteride 5 milliGRAM(s) Oral daily  heparin  Injectable 5000 Unit(s) SubCutaneous every 8 hours  insulin glargine Injectable (LANTUS) 5 Unit(s) SubCutaneous at bedtime  insulin lispro (HumaLOG) corrective regimen sliding scale   SubCutaneous three times a day before meals  insulin lispro (HumaLOG) corrective regimen sliding scale   SubCutaneous at bedtime  insulin lispro Injectable (HumaLOG) 2 Unit(s) SubCutaneous three times a day with meals  metoprolol succinate ER 25 milliGRAM(s) Oral daily  polyethylene glycol 3350 17 Gram(s) Oral daily  senna 2 Tablet(s) Oral at bedtime  tamsulosin 0.4 milliGRAM(s) Oral at bedtime    MEDICATIONS  (PRN):  dextrose 40% Gel 15 Gram(s) Oral once PRN Blood Glucose LESS THAN 70 milliGRAM(s)/deciliter  glucagon  Injectable 1 milliGRAM(s) IntraMuscular once PRN Glucose LESS THAN 70 milligrams/deciliter      PHYSICAL EXAM:  VITALS: T(C): 36.9 (05-12-19 @ 15:23)  T(F): 98.5 (05-12-19 @ 15:23), Max: 98.6 (05-11-19 @ 22:07)  HR: 63 (05-12-19 @ 15:23) (57 - 63)  BP: 138/66 (05-12-19 @ 15:23) (138/66 - 167/77)  RR:  (16 - 16)  SpO2:  (98% - 100%)    GENERAL: NAD, well-groomed, well-developed, thin male, pleasant  EYES: No proptosis, no injection  HEENT:  Atraumatic, Normocephalic, moist mucous membranes  THYROID: Normal size, no palpable nodules  RESPIRATORY: Clear to auscultation bilaterally; No rales, rhonchi, wheezing, or rubs  CARDIOVASCULAR: Regular rate and rhythm; No murmurs; no peripheral edema  GI: Soft, nontender, non distended, normal bowel sounds  CUSHING'S SIGNS: no striae    POCT Blood Glucose.: 136 mg/dL (05-12-19 @ 13:04)  POCT Blood Glucose.: 76 mg/dL (05-12-19 @ 08:50) h2    POCT Blood Glucose.: 221 mg/dL (05-11-19 @ 21:19) Lantus 5  POCT Blood Glucose.: 100 mg/dL (05-11-19 @ 17:20)  POCT Blood Glucose.: 169 mg/dL (05-11-19 @ 12:39)  POCT Blood Glucose.: 98 mg/dL (05-11-19 @ 08:20)    POCT Blood Glucose.: 149 mg/dL (05-10-19 @ 21:34)  POCT Blood Glucose.: 149 mg/dL (05-10-19 @ 17:22)  POCT Blood Glucose.: 100 mg/dL (05-10-19 @ 12:37)  POCT Blood Glucose.: 93 mg/dL (05-10-19 @ 09:08)  POCT Blood Glucose.: 258 mg/dL (05-09-19 @ 22:19)  POCT Blood Glucose.: 194 mg/dL (05-09-19 @ 16:23)    05-12    138  |  105  |  24<H>  ----------------------------<  81  3.7   |  24  |  1.51<H>    EGFR if : 48  EGFR if non : 42    Ca    8.2<L>      05-12  Mg     2.1     05-12      Hemoglobin A1C, Whole Blood: 11.2 % <H> [4.0 - 5.6] (05-09-19 @ 06:30)  Hemoglobin A1C, Whole Blood: 11.6 % <H> [4.0 - 5.6] (05-05-19 @ 07:35)

## 2019-05-12 NOTE — PROGRESS NOTE ADULT - SUBJECTIVE AND OBJECTIVE BOX
CC: no events    TELEMETRY:     PHYSICAL EXAM:    T(C): 36.7 (05-12-19 @ 04:52), Max: 37 (05-11-19 @ 22:07)  HR: 57 (05-12-19 @ 04:52) (57 - 64)  BP: 150/68 (05-12-19 @ 04:52) (142/65 - 167/77)  RR: 16 (05-12-19 @ 04:52) (16 - 17)  SpO2: 98% (05-12-19 @ 04:52) (96% - 100%)  Wt(kg): --  I&O's Summary      Appearance: Normal	  Cardiovascular: Normal S1 S2,RRR, No JVD, No murmurs  Respiratory: Lungs clear to auscultation	  Gastrointestinal:  Soft, Non-tender, + BS	  Extremities: Normal range of motion, No clubbing, cyanosis or edema  Vascular: Peripheral pulses palpable 2+ bilaterally     LABS:	 	                          10.2   4.91  )-----------( 156      ( 12 May 2019 04:48 )             32.8     05-12    138  |  105  |  24<H>  ----------------------------<  81  3.7   |  24  |  1.51<H>    Ca    8.2<L>      12 May 2019 04:48  Mg     2.1     05-12            CARDIAC MARKERS:

## 2019-05-13 ENCOUNTER — TRANSCRIPTION ENCOUNTER (OUTPATIENT)
Age: 84
End: 2019-05-13

## 2019-05-13 VITALS
OXYGEN SATURATION: 96 % | RESPIRATION RATE: 17 BRPM | HEART RATE: 63 BPM | DIASTOLIC BLOOD PRESSURE: 80 MMHG | SYSTOLIC BLOOD PRESSURE: 160 MMHG | TEMPERATURE: 98 F

## 2019-05-13 LAB
ANION GAP SERPL CALC-SCNC: 9 MMO/L — SIGNIFICANT CHANGE UP (ref 7–14)
BASOPHILS # BLD AUTO: 0.03 K/UL — SIGNIFICANT CHANGE UP (ref 0–0.2)
BASOPHILS NFR BLD AUTO: 0.6 % — SIGNIFICANT CHANGE UP (ref 0–2)
BUN SERPL-MCNC: 30 MG/DL — HIGH (ref 7–23)
CALCIUM SERPL-MCNC: 8.4 MG/DL — SIGNIFICANT CHANGE UP (ref 8.4–10.5)
CHLORIDE SERPL-SCNC: 107 MMOL/L — SIGNIFICANT CHANGE UP (ref 98–107)
CO2 SERPL-SCNC: 25 MMOL/L — SIGNIFICANT CHANGE UP (ref 22–31)
CREAT SERPL-MCNC: 1.76 MG/DL — HIGH (ref 0.5–1.3)
EOSINOPHIL # BLD AUTO: 0.23 K/UL — SIGNIFICANT CHANGE UP (ref 0–0.5)
EOSINOPHIL NFR BLD AUTO: 4.8 % — SIGNIFICANT CHANGE UP (ref 0–6)
GLUCOSE SERPL-MCNC: 110 MG/DL — HIGH (ref 70–99)
HCT VFR BLD CALC: 32.3 % — LOW (ref 39–50)
HGB BLD-MCNC: 10.3 G/DL — LOW (ref 13–17)
IMM GRANULOCYTES NFR BLD AUTO: 0.4 % — SIGNIFICANT CHANGE UP (ref 0–1.5)
LYMPHOCYTES # BLD AUTO: 1.43 K/UL — SIGNIFICANT CHANGE UP (ref 1–3.3)
LYMPHOCYTES # BLD AUTO: 29.8 % — SIGNIFICANT CHANGE UP (ref 13–44)
MAGNESIUM SERPL-MCNC: 2.1 MG/DL — SIGNIFICANT CHANGE UP (ref 1.6–2.6)
MCHC RBC-ENTMCNC: 26.5 PG — LOW (ref 27–34)
MCHC RBC-ENTMCNC: 31.9 % — LOW (ref 32–36)
MCV RBC AUTO: 83.2 FL — SIGNIFICANT CHANGE UP (ref 80–100)
MONOCYTES # BLD AUTO: 0.54 K/UL — SIGNIFICANT CHANGE UP (ref 0–0.9)
MONOCYTES NFR BLD AUTO: 11.3 % — SIGNIFICANT CHANGE UP (ref 2–14)
NEUTROPHILS # BLD AUTO: 2.55 K/UL — SIGNIFICANT CHANGE UP (ref 1.8–7.4)
NEUTROPHILS NFR BLD AUTO: 53.1 % — SIGNIFICANT CHANGE UP (ref 43–77)
NRBC # FLD: 0 K/UL — SIGNIFICANT CHANGE UP (ref 0–0)
PLATELET # BLD AUTO: 167 K/UL — SIGNIFICANT CHANGE UP (ref 150–400)
PMV BLD: 9.9 FL — SIGNIFICANT CHANGE UP (ref 7–13)
POTASSIUM SERPL-MCNC: 4 MMOL/L — SIGNIFICANT CHANGE UP (ref 3.5–5.3)
POTASSIUM SERPL-SCNC: 4 MMOL/L — SIGNIFICANT CHANGE UP (ref 3.5–5.3)
RBC # BLD: 3.88 M/UL — LOW (ref 4.2–5.8)
RBC # FLD: 13.4 % — SIGNIFICANT CHANGE UP (ref 10.3–14.5)
SODIUM SERPL-SCNC: 141 MMOL/L — SIGNIFICANT CHANGE UP (ref 135–145)
WBC # BLD: 4.8 K/UL — SIGNIFICANT CHANGE UP (ref 3.8–10.5)
WBC # FLD AUTO: 4.8 K/UL — SIGNIFICANT CHANGE UP (ref 3.8–10.5)

## 2019-05-13 PROCEDURE — 99232 SBSQ HOSP IP/OBS MODERATE 35: CPT | Mod: GC

## 2019-05-13 PROCEDURE — 99232 SBSQ HOSP IP/OBS MODERATE 35: CPT

## 2019-05-13 RX ORDER — SITAGLIPTIN 50 MG/1
1 TABLET, FILM COATED ORAL
Qty: 0 | Refills: 0 | DISCHARGE

## 2019-05-13 RX ORDER — FUROSEMIDE 40 MG
1 TABLET ORAL
Qty: 0 | Refills: 0 | DISCHARGE

## 2019-05-13 RX ORDER — INSULIN LISPRO 100/ML
2 VIAL (ML) SUBCUTANEOUS
Qty: 0 | Refills: 0 | DISCHARGE

## 2019-05-13 RX ORDER — LISINOPRIL 2.5 MG/1
1 TABLET ORAL
Qty: 0 | Refills: 0 | DISCHARGE

## 2019-05-13 RX ADMIN — Medication 1: at 18:15

## 2019-05-13 RX ADMIN — Medication 2 UNIT(S): at 18:15

## 2019-05-13 RX ADMIN — INSULIN GLARGINE 3 UNIT(S): 100 INJECTION, SOLUTION SUBCUTANEOUS at 22:20

## 2019-05-13 RX ADMIN — Medication 2 UNIT(S): at 08:44

## 2019-05-13 RX ADMIN — Medication 100 MILLIGRAM(S): at 11:03

## 2019-05-13 RX ADMIN — CLOPIDOGREL BISULFATE 75 MILLIGRAM(S): 75 TABLET, FILM COATED ORAL at 11:04

## 2019-05-13 RX ADMIN — Medication 25 MILLIGRAM(S): at 05:11

## 2019-05-13 RX ADMIN — AMLODIPINE BESYLATE 5 MILLIGRAM(S): 2.5 TABLET ORAL at 05:11

## 2019-05-13 RX ADMIN — TAMSULOSIN HYDROCHLORIDE 0.4 MILLIGRAM(S): 0.4 CAPSULE ORAL at 22:20

## 2019-05-13 RX ADMIN — ATORVASTATIN CALCIUM 80 MILLIGRAM(S): 80 TABLET, FILM COATED ORAL at 22:20

## 2019-05-13 RX ADMIN — Medication 81 MILLIGRAM(S): at 11:04

## 2019-05-13 RX ADMIN — Medication 2 UNIT(S): at 12:56

## 2019-05-13 RX ADMIN — FINASTERIDE 5 MILLIGRAM(S): 5 TABLET, FILM COATED ORAL at 11:04

## 2019-05-13 NOTE — DISCHARGE NOTE NURSING/CASE MANAGEMENT/SOCIAL WORK - NSDCPEPTSTRK_GEN_ALL_CORE
Stroke support groups for patients, families, and friends/Risk factors for stroke/Call 911 for stroke/Stroke warning signs and symptoms/Signs and symptoms of stroke/Need for follow up after discharge/Prescribed medications/Stroke education booklet

## 2019-05-13 NOTE — DISCHARGE NOTE NURSING/CASE MANAGEMENT/SOCIAL WORK - NSDCDPATPORTLINK_GEN_ALL_CORE
You can access the [facility], offered by [facility], by registering with the following website: [email or web address]

## 2019-05-13 NOTE — PROGRESS NOTE ADULT - SUBJECTIVE AND OBJECTIVE BOX
CC: no cp/sob    TELEMETRY:     PHYSICAL EXAM:    T(C): 37.1 (05-13-19 @ 12:21), Max: 37.2 (05-12-19 @ 21:32)  HR: 62 (05-13-19 @ 12:21) (62 - 67)  BP: 131/66 (05-13-19 @ 12:21) (131/66 - 160/72)  RR: 17 (05-13-19 @ 12:21) (16 - 17)  SpO2: 96% (05-13-19 @ 12:21) (96% - 100%)  Wt(kg): --  I&O's Summary    12 May 2019 07:01  -  13 May 2019 07:00  --------------------------------------------------------  IN: 237 mL / OUT: 400 mL / NET: -163 mL    13 May 2019 07:01  -  13 May 2019 12:49  --------------------------------------------------------  IN: 0 mL / OUT: 150 mL / NET: -150 mL        Appearance: Normal	  Cardiovascular: Normal S1 S2,RRR, No JVD, No murmurs  Respiratory: Lungs clear to auscultation	  Gastrointestinal:  Soft, Non-tender, + BS	  Extremities: Normal range of motion, No clubbing, cyanosis or edema  Vascular: Peripheral pulses palpable 2+ bilaterally     LABS:	 	                          10.3   4.80  )-----------( 167      ( 13 May 2019 05:55 )             32.3     05-13    141  |  107  |  30<H>  ----------------------------<  110<H>  4.0   |  25  |  1.76<H>    Ca    8.4      13 May 2019 05:55  Mg     2.1     05-13            CARDIAC MARKERS:

## 2019-05-13 NOTE — PROGRESS NOTE ADULT - ASSESSMENT
83 yo man with uncontrolled Type 2 DM (A1C 11.6), HTN, HLD, CAD, s/p CABG x3 p/w generalized weakness and slurred speech. Dx with acute CVA. Endocrine consulted for uncontrolled Type 2 DM.

## 2019-05-13 NOTE — PROGRESS NOTE ADULT - SUBJECTIVE AND OBJECTIVE BOX
[facility] NEPHROLOGY- PROGRESS NOTE    84y Male with history of HTN, DM presents with acute CVA. Nephrology consulted for elevated Scr.    REVIEW OF SYSTEMS:  Gen: no changes in weight  Cards: no chest pain  Resp: no dyspnea  GI: no nausea or vomiting or diarrhea  Vascular: no LE edema    No Known Allergies      Hospital Medications: Medications reviewed      VITALS:  T(F): 98.7 (19 @ 12:21), Max: 98.9 (19 @ 21:32)  HR: 62 (19 @ 12:21)  BP: 131/66 (19 @ 12:21)  RR: 17 (19 @ 12:21)  SpO2: 96% (19 @ 12:21)  Wt(kg): --     @ 07:01  -   @ 07:00  --------------------------------------------------------  IN: 237 mL / OUT: 400 mL / NET: -163 mL     @ 07:01  -   @ 14:04  --------------------------------------------------------  IN: 0 mL / OUT: 150 mL / NET: -150 mL      PHYSICAL EXAM:    Gen: NAD, calm  Cards: RRR, +S1/S2, + FERNANDEZ  Resp: CTA B/L  GI: soft, NT/ND, NABS  Vascular: no LE edema B/L      LABS:      141  |  107  |  30<H>  ----------------------------<  110<H>  4.0   |  25  |  1.76<H>    Ca    8.4      13 May 2019 05:55  Mg     2.1           Creatinine Trend: 1.76 <--, 1.51 <--, 1.64 <--, 1.80 <--, 1.72 <--, 1.51 <--, 1.36 <--                        10.3   4.80  )-----------( 167      ( 13 May 2019 05:55 )             32.3     Urine Studies:  Urinalysis Basic - ( 09 May 2019 21:20 )    Color: LIGHT YELLOW / Appearance: CLEAR / S.013 / pH: 6.0  Gluc: 100 / Ketone: NEGATIVE  / Bili: NEGATIVE / Urobili: NORMAL   Blood: TRACE / Protein: 200 / Nitrite: NEGATIVE   Leuk Esterase: NEGATIVE / RBC: 0-2 / WBC 0-2   Sq Epi: OCC / Non Sq Epi:  / Bacteria: NEGATIVE      Sodium, Random Urine: 45 mmol/L ( @ 21:20)  Creatinine, Random Urine: 71.50 mg/dL ( @ 21:20)

## 2019-05-13 NOTE — PROGRESS NOTE ADULT - PROVIDER SPECIALTY LIST ADULT
Cardiology
Endocrinology
Internal Medicine
Nephrology
Neurology
Rehab Medicine
Rehab Medicine
Vascular Surgery
Cardiology
Neurology
Internal Medicine
Internal Medicine

## 2019-05-13 NOTE — PROGRESS NOTE ADULT - ATTENDING COMMENTS
[facility] NEPHROLOGY  Dorian Atkinson M.D.  Rome Castaneda D.O.  Annalise Cervantes M.D.  Lauren Arvizu, MSN, ANP-C    Telephone: [phone]  Facsimile: [phone]    [address] [place] [address]

## 2019-05-13 NOTE — PROGRESS NOTE ADULT - PROBLEM SELECTOR PLAN 1
-Goal -180  -c/w Lantus to 3 units QHS as fasting FS 73 in this elderly male  -cont. humalog 2-2-2  -cont. low dose SS TIDAC/qhs  -Cont. to monitor FS's TIDAC/qhs  -consistent carbohydrate diet    discharge: his A1c is poorly controlled while in the hospitalize he requires very small doses of insulin.  This may be due to dietary non-adherence at home  -discharge: Recommend Januvia 50 mg QD or Tradjenta 5 mg QD. Please verify no history of pancreatitis. C-peptide was normal on May 9, 2019

## 2019-05-13 NOTE — PROGRESS NOTE ADULT - ASSESSMENT
84y Male with history of HTN, DM presents with acute CVA. Nephrology consulted for elevated Scr.    1) TAINA; Secondary to contrast nephropathy initially resolved with new and mild rise in Scr today. Continue with supportive care. Avoid nephrotoxins.    2) CKD-3: With significant proteinuria on UA secondary to DM. Baseline Scr 1.5 as per prior records. Patient advised to f/u as an outpatient for CKD work up including renal US. Monitor electrolytes.    3) HTN with CKD: BP improving. Goal BP as per neuro given recent CVA and carotid artery stenosis. Monitor BP.    4) LE edema: Patient appears euvolemic with mod-severe MR on recent TTE and normal LVSF. Holding lasix 20 mg daily for now. Monitor UO.

## 2019-05-13 NOTE — PROGRESS NOTE ADULT - REASON FOR ADMISSION
Stroke

## 2019-05-13 NOTE — PROGRESS NOTE ADULT - SUBJECTIVE AND OBJECTIVE BOX
Chief Complaint/Follow-up on: T2DM    Subjective: 83 y/o male with T2DM and acute CVA. Feeling well. No complaints.  Awaiting placement to rehab. Family at bedside    MEDICATIONS  (STANDING):  amLODIPine   Tablet 5 milliGRAM(s) Oral daily  aspirin enteric coated 81 milliGRAM(s) Oral daily  atorvastatin 80 milliGRAM(s) Oral at bedtime  clopidogrel Tablet 75 milliGRAM(s) Oral daily  dextrose 5%. 1000 milliLiter(s) (50 mL/Hr) IV Continuous <Continuous>  dextrose 50% Injectable 12.5 Gram(s) IV Push once  dextrose 50% Injectable 25 Gram(s) IV Push once  dextrose 50% Injectable 25 Gram(s) IV Push once  docusate sodium 100 milliGRAM(s) Oral daily  finasteride 5 milliGRAM(s) Oral daily  heparin  Injectable 5000 Unit(s) SubCutaneous every 8 hours  insulin glargine Injectable (LANTUS) 5 Unit(s) SubCutaneous at bedtime  insulin lispro (HumaLOG) corrective regimen sliding scale   SubCutaneous three times a day before meals  insulin lispro (HumaLOG) corrective regimen sliding scale   SubCutaneous at bedtime  insulin lispro Injectable (HumaLOG) 2 Unit(s) SubCutaneous three times a day with meals  metoprolol succinate ER 25 milliGRAM(s) Oral daily  polyethylene glycol 3350 17 Gram(s) Oral daily  senna 2 Tablet(s) Oral at bedtime  tamsulosin 0.4 milliGRAM(s) Oral at bedtime    MEDICATIONS  (PRN):  dextrose 40% Gel 15 Gram(s) Oral once PRN Blood Glucose LESS THAN 70 milliGRAM(s)/deciliter  glucagon  Injectable 1 milliGRAM(s) IntraMuscular once PRN Glucose LESS THAN 70 milligrams/deciliter      Vital Signs Last 24 Hrs  T(C): 37.1 (13 May 2019 12:21), Max: 37.2 (12 May 2019 21:32)  T(F): 98.7 (13 May 2019 12:21), Max: 98.9 (12 May 2019 21:32)  HR: 62 (13 May 2019 12:21) (62 - 67)  BP: 131/66 (13 May 2019 12:21) (131/66 - 160/72)  BP(mean): --  RR: 17 (13 May 2019 12:21) (16 - 17)  SpO2: 96% (13 May 2019 12:21) (96% - 100%)    GENERAL: NAD, well-groomed, well-developed, thin male, pleasant  EYES: No proptosis, no injection  GI: Soft, nontender, non distended, normal bowel sounds    CAPILLARY BLOOD GLUCOSE      POCT Blood Glucose.: 135 mg/dL (13 May 2019 12:08)  POCT Blood Glucose.: 113 mg/dL (13 May 2019 08:36)  POCT Blood Glucose.: 225 mg/dL (12 May 2019 21:36)  POCT Blood Glucose.: 154 mg/dL (12 May 2019 17:35)      05-12    138  |  105  |  24<H>  ----------------------------<  81  3.7   |  24  |  1.51<H>    EGFR if : 48  EGFR if non : 42    Ca    8.2<L>      05-12  Mg     2.1     05-12      Hemoglobin A1C, Whole Blood: 11.2 % <H> [4.0 - 5.6] (05-09-19 @ 06:30)  Hemoglobin A1C, Whole Blood: 11.6 % <H> [4.0 - 5.6] (05-05-19 @ 07:35)

## 2019-05-13 NOTE — PROGRESS NOTE ADULT - SUBJECTIVE AND OBJECTIVE BOX
Patient is a 84y old  Male who presents with a chief complaint of Stroke (12 May 2019 18:14)      SUBJECTIVE / OVERNIGHT EVENTS:    Events noted.  CONSTITUTIONAL: No fever,  or fatigue  RESPIRATORY: No cough, wheezing,  No shortness of breath  CARDIOVASCULAR: No chest pain, palpitations, dizziness, or leg swelling  GASTROINTESTINAL: No abdominal or epigastric pain. No nausea, vomiting.  NEUROLOGICAL: No headaches,     MEDICATIONS  (STANDING):  amLODIPine   Tablet 5 milliGRAM(s) Oral daily  aspirin enteric coated 81 milliGRAM(s) Oral daily  atorvastatin 80 milliGRAM(s) Oral at bedtime  clopidogrel Tablet 75 milliGRAM(s) Oral daily  dextrose 5%. 1000 milliLiter(s) (50 mL/Hr) IV Continuous <Continuous>  dextrose 50% Injectable 12.5 Gram(s) IV Push once  dextrose 50% Injectable 25 Gram(s) IV Push once  dextrose 50% Injectable 25 Gram(s) IV Push once  docusate sodium 100 milliGRAM(s) Oral daily  finasteride 5 milliGRAM(s) Oral daily  heparin  Injectable 5000 Unit(s) SubCutaneous every 8 hours  insulin glargine Injectable (LANTUS) 3 Unit(s) SubCutaneous at bedtime  insulin lispro (HumaLOG) corrective regimen sliding scale   SubCutaneous three times a day before meals  insulin lispro (HumaLOG) corrective regimen sliding scale   SubCutaneous at bedtime  insulin lispro Injectable (HumaLOG) 2 Unit(s) SubCutaneous three times a day with meals  metoprolol succinate ER 25 milliGRAM(s) Oral daily  polyethylene glycol 3350 17 Gram(s) Oral daily  senna 2 Tablet(s) Oral at bedtime  tamsulosin 0.4 milliGRAM(s) Oral at bedtime    MEDICATIONS  (PRN):  dextrose 40% Gel 15 Gram(s) Oral once PRN Blood Glucose LESS THAN 70 milliGRAM(s)/deciliter  glucagon  Injectable 1 milliGRAM(s) IntraMuscular once PRN Glucose LESS THAN 70 milligrams/deciliter        CAPILLARY BLOOD GLUCOSE      POCT Blood Glucose.: 113 mg/dL (13 May 2019 08:36)  POCT Blood Glucose.: 225 mg/dL (12 May 2019 21:36)  POCT Blood Glucose.: 154 mg/dL (12 May 2019 17:35)  POCT Blood Glucose.: 136 mg/dL (12 May 2019 13:04)    I&O's Summary    12 May 2019 07:01  -  13 May 2019 07:00  --------------------------------------------------------  IN: 237 mL / OUT: 400 mL / NET: -163 mL    13 May 2019 07:01  -  13 May 2019 09:15  --------------------------------------------------------  IN: 0 mL / OUT: 150 mL / NET: -150 mL        PHYSICAL EXAM:  GENERAL: NAD  NECK: Supple, No JVD  CHEST/LUNG: Clear to auscultation bilaterally; No wheezing.  HEART: Regular rate and rhythm; No murmurs, rubs, or gallops  ABDOMEN: Soft, Nontender, Nondistended; Bowel sounds present  EXTREMITIES:   No edema  NEUROLOGY: AAO X 3      LABS:                        10.3   4.80  )-----------( 167      ( 13 May 2019 05:55 )             32.3     05-13    141  |  107  |  30<H>  ----------------------------<  110<H>  4.0   |  25  |  1.76<H>    Ca    8.4      13 May 2019 05:55  Mg     2.1     05-13              CAPILLARY BLOOD GLUCOSE      POCT Blood Glucose.: 113 mg/dL (13 May 2019 08:36)  POCT Blood Glucose.: 225 mg/dL (12 May 2019 21:36)  POCT Blood Glucose.: 154 mg/dL (12 May 2019 17:35)  POCT Blood Glucose.: 136 mg/dL (12 May 2019 13:04)                RADIOLOGY & ADDITIONAL TESTS:    Imaging Personally Reviewed:    Consultant(s) Notes Reviewed:      Care Discussed with Consultants/Other Providers:

## 2019-05-17 NOTE — ED CDU PROVIDER INITIAL DAY NOTE - RESPIRATORY, MLM
[No studies available for review at this time.] : No studies available for review at this time. Breath sounds clear and equal bilaterally.

## 2019-05-24 ENCOUNTER — APPOINTMENT (OUTPATIENT)
Dept: ELECTROPHYSIOLOGY | Facility: CLINIC | Age: 84
End: 2019-05-24

## 2019-09-11 ENCOUNTER — EMERGENCY (EMERGENCY)
Facility: HOSPITAL | Age: 84
LOS: 1 days | Discharge: HOME | End: 2019-09-11
Attending: EMERGENCY MEDICINE | Admitting: EMERGENCY MEDICINE
Payer: MEDICARE

## 2019-09-11 VITALS
SYSTOLIC BLOOD PRESSURE: 146 MMHG | HEART RATE: 73 BPM | DIASTOLIC BLOOD PRESSURE: 77 MMHG | RESPIRATION RATE: 18 BRPM | OXYGEN SATURATION: 100 % | TEMPERATURE: 98 F

## 2019-09-11 VITALS
HEART RATE: 52 BPM | OXYGEN SATURATION: 98 % | DIASTOLIC BLOOD PRESSURE: 54 MMHG | RESPIRATION RATE: 18 BRPM | SYSTOLIC BLOOD PRESSURE: 181 MMHG

## 2019-09-11 DIAGNOSIS — Z90.49 ACQUIRED ABSENCE OF OTHER SPECIFIED PARTS OF DIGESTIVE TRACT: Chronic | ICD-10-CM

## 2019-09-11 DIAGNOSIS — Z95.1 PRESENCE OF AORTOCORONARY BYPASS GRAFT: Chronic | ICD-10-CM

## 2019-09-11 LAB
ALBUMIN SERPL ELPH-MCNC: 3.4 G/DL — SIGNIFICANT CHANGE UP (ref 3.3–5)
ALP SERPL-CCNC: 87 U/L — SIGNIFICANT CHANGE UP (ref 40–120)
ALT FLD-CCNC: 21 U/L — SIGNIFICANT CHANGE UP (ref 4–41)
ANION GAP SERPL CALC-SCNC: 8 MMO/L — SIGNIFICANT CHANGE UP (ref 7–14)
AST SERPL-CCNC: 20 U/L — SIGNIFICANT CHANGE UP (ref 4–40)
BASOPHILS # BLD AUTO: 0.03 K/UL — SIGNIFICANT CHANGE UP (ref 0–0.2)
BASOPHILS NFR BLD AUTO: 0.6 % — SIGNIFICANT CHANGE UP (ref 0–2)
BILIRUB SERPL-MCNC: 0.4 MG/DL — SIGNIFICANT CHANGE UP (ref 0.2–1.2)
BUN SERPL-MCNC: 22 MG/DL — SIGNIFICANT CHANGE UP (ref 7–23)
CALCIUM SERPL-MCNC: 8.9 MG/DL — SIGNIFICANT CHANGE UP (ref 8.4–10.5)
CHLORIDE SERPL-SCNC: 103 MMOL/L — SIGNIFICANT CHANGE UP (ref 98–107)
CO2 SERPL-SCNC: 28 MMOL/L — SIGNIFICANT CHANGE UP (ref 22–31)
CREAT SERPL-MCNC: 1.7 MG/DL — HIGH (ref 0.5–1.3)
EOSINOPHIL # BLD AUTO: 0.09 K/UL — SIGNIFICANT CHANGE UP (ref 0–0.5)
EOSINOPHIL NFR BLD AUTO: 1.7 % — SIGNIFICANT CHANGE UP (ref 0–6)
GLUCOSE SERPL-MCNC: 275 MG/DL — HIGH (ref 70–99)
HCT VFR BLD CALC: 34.2 % — LOW (ref 39–50)
HGB BLD-MCNC: 10.8 G/DL — LOW (ref 13–17)
IMM GRANULOCYTES NFR BLD AUTO: 0.2 % — SIGNIFICANT CHANGE UP (ref 0–1.5)
INR BLD: 1.02 — SIGNIFICANT CHANGE UP (ref 0.88–1.17)
LYMPHOCYTES # BLD AUTO: 1.52 K/UL — SIGNIFICANT CHANGE UP (ref 1–3.3)
LYMPHOCYTES # BLD AUTO: 29.3 % — SIGNIFICANT CHANGE UP (ref 13–44)
MCHC RBC-ENTMCNC: 26.2 PG — LOW (ref 27–34)
MCHC RBC-ENTMCNC: 31.6 % — LOW (ref 32–36)
MCV RBC AUTO: 83 FL — SIGNIFICANT CHANGE UP (ref 80–100)
MONOCYTES # BLD AUTO: 0.46 K/UL — SIGNIFICANT CHANGE UP (ref 0–0.9)
MONOCYTES NFR BLD AUTO: 8.9 % — SIGNIFICANT CHANGE UP (ref 2–14)
NEUTROPHILS # BLD AUTO: 3.08 K/UL — SIGNIFICANT CHANGE UP (ref 1.8–7.4)
NEUTROPHILS NFR BLD AUTO: 59.3 % — SIGNIFICANT CHANGE UP (ref 43–77)
NRBC # FLD: 0 K/UL — SIGNIFICANT CHANGE UP (ref 0–0)
PLATELET # BLD AUTO: 123 K/UL — LOW (ref 150–400)
PMV BLD: 10.4 FL — SIGNIFICANT CHANGE UP (ref 7–13)
POTASSIUM SERPL-MCNC: 4.4 MMOL/L — SIGNIFICANT CHANGE UP (ref 3.5–5.3)
POTASSIUM SERPL-SCNC: 4.4 MMOL/L — SIGNIFICANT CHANGE UP (ref 3.5–5.3)
PROT SERPL-MCNC: 6.6 G/DL — SIGNIFICANT CHANGE UP (ref 6–8.3)
PROTHROM AB SERPL-ACNC: 11.3 SEC — SIGNIFICANT CHANGE UP (ref 9.8–13.1)
RBC # BLD: 4.12 M/UL — LOW (ref 4.2–5.8)
RBC # FLD: 13.6 % — SIGNIFICANT CHANGE UP (ref 10.3–14.5)
SODIUM SERPL-SCNC: 139 MMOL/L — SIGNIFICANT CHANGE UP (ref 135–145)
TROPONIN T, HIGH SENSITIVITY: 41 NG/L — SIGNIFICANT CHANGE UP (ref ?–14)
TROPONIN T, HIGH SENSITIVITY: 46 NG/L — SIGNIFICANT CHANGE UP (ref ?–14)
WBC # BLD: 5.19 K/UL — SIGNIFICANT CHANGE UP (ref 3.8–10.5)
WBC # FLD AUTO: 5.19 K/UL — SIGNIFICANT CHANGE UP (ref 3.8–10.5)

## 2019-09-11 PROCEDURE — 99283 EMERGENCY DEPT VISIT LOW MDM: CPT | Mod: 25

## 2019-09-11 PROCEDURE — 93010 ELECTROCARDIOGRAM REPORT: CPT

## 2019-09-11 RX ORDER — SODIUM CHLORIDE 9 MG/ML
500 INJECTION INTRAMUSCULAR; INTRAVENOUS; SUBCUTANEOUS ONCE
Refills: 0 | Status: COMPLETED | OUTPATIENT
Start: 2019-09-11 | End: 2019-09-11

## 2019-09-11 RX ADMIN — SODIUM CHLORIDE 500 MILLILITER(S): 9 INJECTION INTRAMUSCULAR; INTRAVENOUS; SUBCUTANEOUS at 16:42

## 2019-09-11 NOTE — ED PROVIDER NOTE - CLINICAL SUMMARY MEDICAL DECISION MAKING FREE TEXT BOX
Symptoms likely due to mild dehydration.  Will gently hydrate (with caution given pt's CHF). No obvious evidence of ACS on ECG-- will check trop.  No symptoms or signs to suggest acute infection.  Pt with dementia.  Will attempt to contact family.

## 2019-09-11 NOTE — ED PROVIDER NOTE - ATTENDING CONTRIBUTION TO CARE
Jared: 84 yo male with a h/o  CABG, Afib, DM, dementia on coumadin BIBEMS when he was found leaning against a fence outside on the street. PT endorses that he was coming to visit his wife in the hospital and didn't want to pay for parking so he parked on [address] and was in the process of walking to the hospital when he felt tired and was resting. Pt's wife is not a patient here and when the son was contacted he was unaware what had transpired. Pt has no complaints. Exam: GENERAL: well appearing, NAD, HEENT: MMM, PERRLA, CARDIO: +S1/S2, no murmurs, rubs or gallops, LUNGS: CTA B/L, no wheezing, rales or rhonchi, ABD: soft, nontender, BSx4 quadrants, no guarding or rigidity. EXT: 5/5 strength x 4 extremities No LE edema or calf TTP, 2+ distal pulses x 4 extremities. NEURO: AxOx3, CNII-XII intact, SKIN: no rashes or lesions, well perfused A/P- 84yo male with dementia found outside. Pt feeling well but endorses "feeling tired" will obtain labs, EKG and reassess. Jared: 86 yo male with a h/o  CABG, Afib, DM, dementia on coumadin BIBEMS when he was found leaning against a fence outside on the street. PT endorses that he was coming to visit his wife in the hospital and didn't want to pay for parking so he parked on [address] and was in the process of walking to the hospital when he felt tired and was resting. Pt's wife is not a patient here and when the son was contacted he was unaware what had transpired. Pt has no complaints. Exam: GENERAL: well appearing, NAD, HEENT: MMM, PERRLA, CARDIO: +S1/S2, no murmurs, rubs or gallops, LUNGS: CTA B/L, no wheezing, rales or rhonchi, ABD: soft, nontender, BSx4 quadrants, no guarding or rigidity. EXT: 5/5 strength x 4 extremities No LE edema or calf TTP, 2+ distal pulses x 4 extremities. NEURO: AxOx3, CNII-XII intact, SKIN: no rashes or lesions, well perfused A/P- 86yo male with dementia found outside. Pt feeling well but endorses "feeling tired" will obtain labs, EKG and reassess. Son called and will come to the ED. Jared: 86 yo male with a h/o  CABG, Afib, DM, dementia on coumadin BIBEMS when he was found leaning against a fence outside on the street. PT endorses that he was coming to visit his wife in the hospital and didn't want to pay for parking so he parked on [address] and was in the process of walking to the hospital when he felt tired and was resting. Pt has no complaints. Exam: GENERAL: well appearing, NAD, HEENT: MMM, PERRLA, CARDIO: +S1/S2, no murmurs, rubs or gallops, LUNGS: CTA B/L, no wheezing, rales or rhonchi, ABD: soft, nontender, BSx4 quadrants, no guarding or rigidity. EXT: 5/5 strength x 4 extremities No LE edema or calf TTP, 2+ distal pulses x 4 extremities. NEURO: AxOx3, CNII-XII intact, SKIN: no rashes or lesions, well perfused A/P- 86yo male with dementia found outside. Pt feeling well but endorses "feeling tired" will obtain labs, EKG and reassess. Son called and will come to the ED.

## 2019-09-11 NOTE — PROVIDER CONTACT NOTE (OTHER) - ASSESSMENT
alli called son at [phone].  son resides in [facility] and will be coming down to see his father in the ED.  alli connected son with attending in ED>

## 2019-09-11 NOTE — ED PROVIDER NOTE - PMH
3-vessel coronary artery disease    Benign prostatic hyperplasia    DM2 (diabetes mellitus, type 2)    HTN (hypertension)

## 2019-09-11 NOTE — ED ADULT NURSE REASSESSMENT NOTE - NS ED NURSE REASSESS COMMENT FT1
Pt. received in spot 1A with c/o weakness. hx of dementia. Pt. A&O x3 with periods of confusion. noted with HR ranging 44-52. repeat EKG done.  Pt. asymptomatic at this time. Brittani RANKIN notified, Pt. denies any dizziness or weakness at this time. sons at bedside.

## 2019-09-11 NOTE — ED ADULT NURSE NOTE - OBJECTIVE STATEMENT
Pt A+OX2, forgetful, picked up on street by EMS for weakness.  Pt states he was walking to hospital from [employer] to visit his wife and felt tired.  Denies pain or complaints.  EKG done.  CM placed.  Labs obtained and sent as ordered.  #20g SL R arm placed.  MD at bedside.

## 2019-09-11 NOTE — ED ADULT TRIAGE NOTE - CHIEF COMPLAINT QUOTE
Pt states that he parked his car on [address] and was walking to hospital to visit his wife and he became tired and weak and was noted to be hanging on to a fence. Denies CP, no SOB, initially denies PMH but states that he takes a medication for his diabetes  PMH DM

## 2019-09-11 NOTE — ED PROVIDER NOTE - PROGRESS NOTE DETAILS
I called number for pt's home and left voicemail.  I called number for pt's son but phone rang, no answer and no voicemail. I spoke to pt's son Washington Damon, [phone].  He is in [place] now and will come to hospital. Jared:  pt's sons arrived in the ED, pt is at baseline mental status and acting normally. Pt's wife is an inpatient here. awaiting CT head and will likely d/c pt home with family if negative. Resident: Kb Worley - Spoke with patient's son and he confirms fathers previous story. There is no concern for pt ever being altered at this point likely all just miscommunication with son and patient. Son and patient would both like to go home without CT head because the patient is completely fine. WIll DC now with PCP FU. Jared: pt noted to be sinus bradycardic, pt remains asymptomatic- no dizziness, chest pain or SOB, ambulating without difficulty. hermelinda root pt has cards outpatient followup.

## 2019-09-11 NOTE — ED PROVIDER NOTE - OBJECTIVE STATEMENT
84 yo M PMH CABG, Afib, DM, dementia BIBEMS for weakness. Pt reports that his wife is here hospitalized.  He was coming to visit her and got tired while walking several blocks from his car.  He now feels all better.  denies pain. denies LOC.  denies any recent fever cough or other infectious symptoms.

## 2019-09-11 NOTE — ED ADULT NURSE NOTE - CHIEF COMPLAINT QUOTE
Pt states that he parked his car on [address] and was walking to hospital to visit his wife and he became tired and weak and was noted to be hanging on to a fence. Denies CP, no SOB, initially denies PMH but states that he takes a medication for his diabetes  PMH DM

## 2019-09-11 NOTE — ED PROVIDER NOTE - PATIENT PORTAL LINK FT
You can access the FollowMyHealth Patient Portal offered by [facility] by registering at the following website: [email or web address]. By joining [facility]’s FollowMyHealth portal, you will also be able to view your health information using other applications (apps) compatible with our system.

## 2019-09-27 NOTE — ED PROVIDER NOTE - CARDIOVASCULAR NEGATIVE STATEMENT, MLM
Patient:   MILO FOY            MRN: [id number]            FIN: [id number]              Age:   76 years     Sex:  FEMALE     :  42   Associated Diagnoses:   None   Author:   ABBY HENRY     Basic Information     Stay Summary:     Feels well , no new complaints , afebrile     Review of Systems   Constitutional:  Weakness, No sweats.    Eye:  Negative.    Ear/Nose/Mouth/Throat:  Negative.    Respiratory:  Negative.    Cardiovascular:  Negative.    Gastrointestinal:  Negative.    Genitourinary:  Negative.    Immunologic:  Negative.    Musculoskeletal:  Joint pain, Muscle pain.    Integumentary:  Negative.    Neurologic:  Negative.    Psychiatric:  Negative.      Health Status   Allergies:    Allergic Reactions (All)  Severity Not Documented  Latex- No reactions were documented.  No Known Medication Allergies   Current medications:    Medications (17) Active  Scheduled: (13)  AmLODIPine 10 mg tab  10 mg 1 tab, Oral, Daily  Ascorbic acid 500 mg tab  500 mg 1 tab, Oral, Daily  Aspirin 81 mg DR tab  81 mg 1 tab, Oral, Daily  Carvedilol 12.5 mg tab  12.5 mg 1 tab, Oral, Q12H  cefTRIAXone  2,000 mg 20 mL, Slow IV Push, Daily  Ergocalciferol 50,000 unit (1.25 mg) cap  50,000 unit 1 cap, Oral, Q   Gabapentin 300 mg cap  600 mg 2 cap, Oral, TID [with breakfast, dinner & HS]  Lisinopril 10 mg tab  10 mg 1 tab, Oral, Q12H  Multivitamin with minerals chew tab  1 tab, Chewed, Daily  Nystatin powder 15 gm  1 application, Topical, TID  Oxybutynin 5 mg tab  10 mg 2 tab, Oral, Q Bedtime  Polyethylene glycol 3350 oral recon powder 17 gm packet UD  17 gm 1 packet, Oral, Daily  TraMADol 50 mg tab  50 mg 1 tab, Oral, Q8H  Continuous: (0)  PRN: (4)  DiphenhydrAMINE 25 mg cap  25 mg 1 cap, Oral, Q6H  GuaiFENesin 200 mg/10 mL oral liquid repack  200 mg 10 mL, Oral, Q8H  Hydrocodone-acetaminophen 5-325 mg tab  1 tab, Oral, Q4H  LORazepam 2 mg/1 mL inj SDV  0.5 mg 0.25 mL, Slow IV Push, On Call  ,   S/P Cefepime  #2  CTX #2     Physical Examination   VS/Measurements     Vitals between:   29-JUN-2019 10:03:44   TO   30-JUN-2019 10:03:44                   LAST RESULT MINIMUM MAXIMUM  Temperature 36.9 36.5 37  Heart Rate 79 73 92  Respiratory Rate 16 14 18  NISBP           142 102 152  NIDBP           84 56 86  NIMBP           101 101 101  SpO2                    94 92 95    General:  Alert and oriented, No acute distress.    Respiratory:  Lungs are clear to auscultation, Respirations are non-labored, Breath sounds are equal.   Cardiovascular:  Normal rate, Regular rhythm, No murmur.    Gastrointestinal:  Soft, Non-tender, Non-distended.    Musculoskeletal     Upper extremity exam: shoulder (right, tenderness).     Integumentary:  Warm, Dry, Intact.    Neurologic:  Alert, Oriented.    Psychiatric:  Cooperative, Appropriate mood & affect.      Review / Management   Results review:     Labs between:  29-JUN-2019 10:03 to 30-JUN-2019 10:03  CBC:                 WBC  HgB  Hct  Plt  MCV  RDW   29-JUN-2019 (H) 12.4  (L) 11.2  (L) 35.9  304  80.7  (H) 19.5   DIFF:                 Seg  Neutroph//ABS  Lymph//ABS  Mono//ABS  EOS/ABS  29-JUN-2019 NOT APPLICABLE  74 // (H) 9.3  10 // 1.3 4 // 0.4 11 // (H) 1.3  BMP:                 Na  Cl  BUN  Glu   29-JUN-2019 136  101  14  (H) 183                              K  CO2  Cr  Ca                              4.1  28  0.62  8.7   CMP:                 AST  ALT  AlkPhos  Bili  Albumin   29-JUN-2019 (H) 119  (H) 165  101  0.2  (L) 2.5                  .      Impression and Plan   Diagnosis     ASSESSMENT:  1. Severe sepsis and possible septic shock secondary to urinary tract infection and pyelonephritis.  2. Questionable lung mass.  3. 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  4. Elevated liver function enzymes with recent history of cholecystectomy. Possible hypotensive liver injury.  5. Generalized weakness  secondary to above.  PLAN:  1. Cont. CTX .  2. Final urine and blood cultures, urine with growth of Quinolone resistant Proteus. .  3. Follow CBC and BMP, normal WBC.  5. Monitor liver function enzymes, improving.  6. Will continue to follow, will be able to transition to PO Augmentin on discharge, plan for 14 full days of abx therapy.  .     Course:  Improving.    Orders   no chest pain and no edema.

## 2020-03-06 NOTE — PATIENT PROFILE ADULT - TOBACCO USE
unchanged since that time. She denies current suicidal and homicidal ideation. Family history significant for no psychiatric illness. Possible organic causes contributing are: none. Risk factors: severe illness Previous treatment includes none. More detail above in the chiefcomplaint(s), interim history and below in the review of systems.      Past Medical History:   Diagnosis Date    COPD (chronic obstructive pulmonary disease) ([facility] [facility]     Dr Tanvi Carrasco Current use of long term anticoagulation     Cyst of neck     after plastic surgery    Emphysema of lung ([facility] [address]     History of humerus fracture     LEFT    Hyperlipidemia LDL goal < 130     Intermittent atrial fibrillation (HCC)     Dr Carolina Rich, Dr Ezekiel Saul Osteopenia 2014    S/P colonoscopic polypectomy , ,     Dr Eri Valenzuela    Smoking history     Vitamin D deficiency        Past Surgical History:   Procedure Laterality Date    ATRIAL ABLATION SURGERY      BREAST SURGERY  1975    staph infection, postpartum    COLONOSCOPY  7/22/15    w/polypectomy Dr Anirudh Dalton      Dr Yao Mullen; HI RISK IND N/A 2018    COLONOSCOPY performed by Andrey Gonzalez MD at [address] [facility] Marital status:      Spouse name: Not on file    Number of children: 2    Years of education: Not on file    Highest education level: Not on file   Occupational History    Occupation: [employer], retired   Social Needs    Financial resource strain: Not on file    Food insecurity:     Worry: Not on file     Inability: Not on file   [email or web address] needs:     Medical: Not on file     Non-medical: Not on file   Tobacco Use    Smoking status: Former Smoker     Packs/day: 1.00     Years: 50.00     Pack years: 50.00     Types: Cigarettes     Start date:      Last attempt to quit: 2015     Years since quittin.2    Smokeless tobacco: Never Used    Tobacco comment: pt will try smoking cessation   Substance and Sexual Activity    Alcohol use: No    Drug use: No    Sexual activity: Not on file   Lifestyle    Physical activity:     Days per week: Not on file     Minutes per session: Not on file    Stress: Not on file   Relationships    Social connections:     Talks on phone: Not on file     Gets together: Not on file     Attends Worship service: Not on file     Active member of club or organization: Not on file     Attends meetings of clubs or organizations: Not on file     Relationship status: Not on file    Intimate partner violence:     Fear of current or ex partner: Not on file     Emotionally abused: Not on file     Physically abused: Not on file     Forced sexual activity: Not on file   Other Topics Concern    Not on file   Social History Narrative    Born in [place], one brother in [place] [place] [facility] [place], keeps in touch    , 2 children, one son in [place], one daughter in [place]    5 grandchildren     Lives in a house in [place] with spouse     Worked for [employer] and full time at [employer], [employer], fashion, family life     Caregiver of mother        Family History   Problem Relation Age of Onset    Osteoarthritis Mother     Osteoporosis Mother     Cancer Mother         anorectal     Colon Cancer Mother        Family and socialhistory were reviewed and pertinent changes documented. ALLERGIES    Patient has no known allergies.     Current Outpatient Medications on File Prior to Visit   Medication Sig Dispense Refill    amiodarone (CORDARONE) 200 MG tablet 1 TAB BY MOUTH 2 TIMES A DAY THRU 3/4/20 THEN 1 TABLET DAILY STARTING 3/5/20      amoxicillin-clavulanate (AUGMENTIN) 875-125 MG per tablet TAKE 1 TABLET BY MOUTH TWICE A DAY      methylPREDNISolone (MEDROL DOSEPACK) 4 MG tablet TAKE 6 TABLETS ON DAY 1 AS DIRECTED ON PACKAGE AND DECREASE BY 1 TAB EACH DAY FOR A TOTAL OF 6 DAYS      pravastatin (PRAVACHOL) 20 MG tablet TAKE 1 TABLET BY MOUTH EVERY DAY AT NIGHT      dilTIAZem (CARDIZEM CD) 240 MG extended release capsule TAKE 1 CAPSULE BY MOUTH EVERY DAY 90 capsule 1    umeclidinium-vilanterol (ANORO ELLIPTA) 62.5-25 MCG/INH AEPB inhaler Inhale 1 puff into the lungs daily 1 each 5    rivaroxaban (XARELTO) 20 MG TABS tablet TAKE 1 TABLET BY MOUTH EVERY DAY WITH BREAKFAST 90 tablet 3    b complex vitamins tablet Take 1 tablet by mouth daily      calcium carbonate 600 MG TABS tablet Take 1 tablet by mouth daily      magnesium oxide (MAG-OX) 400 MG tablet Take 400 mg by mouth daily      Vitamin D (CHOLECALCIFEROL) 1000 UNITS CAPS capsule Take 1,000 Units by mouth daily. No current facility-administered medications on file prior to visit. Review of Systems   Constitutional: Positive for fatigue and unexpected weight change (weight gain). Negative for diaphoresis and fever. HENT: Negative for congestion, nosebleeds, trouble swallowing and voice change. Eyes: Negative for pain and visual disturbance. Respiratory: Positive for shortness of breath (exertional). Negative for cough, choking, chest tightness and wheezing. Cardiovascular: Positive for palpitations and leg swelling. Negative for chest pain. Gastrointestinal: Positive for constipation. Negative for abdominal distention, abdominal pain, anorexia, change in bowel habit, diarrhea, nausea and vomiting. Endocrine: Negative for cold intolerance, heat intolerance, polydipsia and polyuria. Genitourinary: Negative for difficulty urinating, flank pain and hematuria. Musculoskeletal: Negative for joint swelling and myalgias. Skin: Positive for color change (large bruises on the abdomen). Negative for rash. Allergic/Immunologic: Negative for immunocompromised state. Neurological: Positive for weakness. Negative for dizziness, tremors, syncope, speech difficulty and light-headedness. Hematological: Bruises/bleeds easily. Psychiatric/Behavioral: Positive for dysphoric mood. Negative for confusion and decreased concentration. The patient is nervous/anxious. Vitals:    20 0859   BP: 118/60   Site: Right Upper Arm   Position: Sitting   Cuff Size: Medium Adult   Pulse: 76   Resp: 16   Temp: 98.2 °F (36.8 °C)   TempSrc: Temporal   SpO2: 98%   Weight: 143 lb 9.6 oz (65.1 kg)   Height: 5' 1\" (1.549 m)       Physical Exam  Constitutional:       General: She is in acute distress (anxious). Appearance: She is ill-appearing. HENT:      Head: Atraumatic. Nose: No rhinorrhea. Mouth/Throat:      Mouth: Mucous membranes are moist.   Eyes:      General: No scleral icterus. Extraocular Movements: Extraocular movements intact. Conjunctiva/sclera: Conjunctivae normal.   Neck:      Musculoskeletal: Neck supple. Thyroid: No thyroid mass or thyromegaly. Cardiovascular:      Rate and Rhythm: Normal rate and regular rhythm. No extrasystoles are present. Pulses:           Radial pulses are 2+ on the right side and 2+ on the left side. Heart sounds: Heart sounds are distant. No gallop. Pulmonary:      Effort: Pulmonary effort is normal. No respiratory distress. Breath sounds: No wheezing or rales. Comments: Diminished breath sounds bilaterally    Chest:      Chest wall: No tenderness. Abdominal:      General: There is no distension. Palpations: Abdomen is soft. There is no mass. Tenderness: There is abdominal tenderness (due to abdominal wall hematomas). There is no guarding. Musculoskeletal: Normal range of motion. General: No deformity. Right lower le+ Pitting Edema present. Left lower le+ Pitting Edema present. Lymphadenopathy:      Cervical: No cervical adenopathy. Skin:     General: Skin is warm. Coloration: Skin is pale. Skin is not jaundiced.       Comments: Tensive bruising on the lower abdomen, subcutaneous nodularity from hematomas Never smoker

## 2020-05-22 NOTE — H&P ADULT - LYMPH NODES
Patient prepared for and ready to be discharged. Patient discharged at this time in no acute distress after verbalizing understanding of discharge instructions. Patient left after receiving After Visit Summary instructions.       Carlita Valentino RN  09/01/18 [id number]
normal...
No lymphadedenopathy

## 2021-11-02 NOTE — PROGRESS NOTE ADULT - SUBJECTIVE AND OBJECTIVE BOX
SUBJECTIVE: 83yo Man seen and examined during morning rounds. No acute events overnight. Afebrile, VS stable. Pain well controlled with current regimen. Pt states that he wants to go home.    OBJECTIVE:    Physical Exam:  Gen: Resting in bed, NAD, alert and oriented  Resp: respirations unlabored, no increased WOB     Vital Signs Last 24 Hrs  T(C): 37.1 (09 May 2019 05:54), Max: 37.1 (09 May 2019 05:54)  T(F): 98.7 (09 May 2019 05:54), Max: 98.7 (09 May 2019 05:54)  HR: 65 (09 May 2019 05:54) (60 - 77)  BP: 158/75 (09 May 2019 05:54) (146/85 - 164/78)  BP(mean): --  RR: 14 (09 May 2019 05:54) (14 - 16)  SpO2: 94% (09 May 2019 05:54) (94% - 100%)    I&O's Detail    07 May 2019 07:01  -  08 May 2019 07:00  --------------------------------------------------------  IN:  Total IN: 0 mL    OUT:    Voided: 400 mL  Total OUT: 400 mL    Total NET: -400 mL      08 May 2019 07:01  -  09 May 2019 06:35  --------------------------------------------------------  IN:    Oral Fluid: 1118 mL  Total IN: 1118 mL    OUT:    Voided: 200 mL  Total OUT: 200 mL    Total NET: 918 mL          MEDICATIONS  (STANDING):  amLODIPine   Tablet 5 milliGRAM(s) Oral daily  aspirin enteric coated 81 milliGRAM(s) Oral daily  atorvastatin 80 milliGRAM(s) Oral at bedtime  clopidogrel Tablet 75 milliGRAM(s) Oral daily  dextrose 5%. 1000 milliLiter(s) (50 mL/Hr) IV Continuous <Continuous>  dextrose 50% Injectable 12.5 Gram(s) IV Push once  dextrose 50% Injectable 25 Gram(s) IV Push once  dextrose 50% Injectable 25 Gram(s) IV Push once  docusate sodium 100 milliGRAM(s) Oral daily  finasteride 5 milliGRAM(s) Oral daily  heparin  Injectable 5000 Unit(s) SubCutaneous every 8 hours  insulin glargine Injectable (LANTUS) 5 Unit(s) SubCutaneous at bedtime  insulin lispro (HumaLOG) corrective regimen sliding scale   SubCutaneous three times a day before meals  insulin lispro (HumaLOG) corrective regimen sliding scale   SubCutaneous at bedtime  insulin lispro Injectable (HumaLOG) 2 Unit(s) SubCutaneous three times a day with meals  polyethylene glycol 3350 17 Gram(s) Oral daily  senna 2 Tablet(s) Oral at bedtime  tamsulosin 0.4 milliGRAM(s) Oral at bedtime    MEDICATIONS  (PRN):  dextrose 40% Gel 15 Gram(s) Oral once PRN Blood Glucose LESS THAN 70 milliGRAM(s)/deciliter  glucagon  Injectable 1 milliGRAM(s) IntraMuscular once PRN Glucose LESS THAN 70 milligrams/deciliter      LABS:                        10.2   5.44  )-----------( 153      ( 08 May 2019 06:00 )             32.7       05-08    139  |  105  |  19  ----------------------------<  94  4.2   |  23  |  1.51<H>    Ca    8.5      08 May 2019 06:00 SUBJECTIVE: 85yo Man seen and examined during morning rounds. No acute events overnight. Afebrile, VS stable. Pain well controlled with current regimen. Pt states that he wants to go home.    OBJECTIVE:    Physical Exam:  Gen: Resting in bed, NAD, alert and oriented  Resp: respirations unlabored, no increased WOB     Vital Signs Last 24 Hrs  T(C): 37.1 (09 May 2019 05:54), Max: 37.1 (09 May 2019 05:54)  T(F): 98.7 (09 May 2019 05:54), Max: 98.7 (09 May 2019 05:54)  HR: 65 (09 May 2019 05:54) (60 - 77)  BP: 158/75 (09 May 2019 05:54) (146/85 - 164/78)  BP(mean): --  RR: 14 (09 May 2019 05:54) (14 - 16)  SpO2: 94% (09 May 2019 05:54) (94% - 100%)    I&O's Detail    07 May 2019 07:01  -  08 May 2019 07:00  --------------------------------------------------------  IN:  Total IN: 0 mL    OUT:    Voided: 400 mL  Total OUT: 400 mL    Total NET: -400 mL      08 May 2019 07:01  -  09 May 2019 06:35  --------------------------------------------------------  IN:    Oral Fluid: 1118 mL  Total IN: 1118 mL    OUT:    Voided: 200 mL  Total OUT: 200 mL    Total NET: 918 mL    MEDICATIONS  (STANDING):  amLODIPine   Tablet 5 milliGRAM(s) Oral daily  aspirin enteric coated 81 milliGRAM(s) Oral daily  atorvastatin 80 milliGRAM(s) Oral at bedtime  clopidogrel Tablet 75 milliGRAM(s) Oral daily  dextrose 5%. 1000 milliLiter(s) (50 mL/Hr) IV Continuous <Continuous>  dextrose 50% Injectable 12.5 Gram(s) IV Push once  dextrose 50% Injectable 25 Gram(s) IV Push once  dextrose 50% Injectable 25 Gram(s) IV Push once  docusate sodium 100 milliGRAM(s) Oral daily  finasteride 5 milliGRAM(s) Oral daily  heparin  Injectable 5000 Unit(s) SubCutaneous every 8 hours  insulin glargine Injectable (LANTUS) 5 Unit(s) SubCutaneous at bedtime  insulin lispro (HumaLOG) corrective regimen sliding scale   SubCutaneous three times a day before meals  insulin lispro (HumaLOG) corrective regimen sliding scale   SubCutaneous at bedtime  insulin lispro Injectable (HumaLOG) 2 Unit(s) SubCutaneous three times a day with meals  polyethylene glycol 3350 17 Gram(s) Oral daily  senna 2 Tablet(s) Oral at bedtime  tamsulosin 0.4 milliGRAM(s) Oral at bedtime    MEDICATIONS  (PRN):  dextrose 40% Gel 15 Gram(s) Oral once PRN Blood Glucose LESS THAN 70 milliGRAM(s)/deciliter  glucagon  Injectable 1 milliGRAM(s) IntraMuscular once PRN Glucose LESS THAN 70 milligrams/deciliter      LABS:                        10.2   5.44  )-----------( 153      ( 08 May 2019 06:00 )             32.7       05-08    139  |  105  |  19  ----------------------------<  94  4.2   |  23  |  1.51<H>    Ca    8.5      08 May 2019 06:00 Mother remains in OR

## 2021-11-11 NOTE — ED CDU PROVIDER INITIAL DAY NOTE - CPE EDP EYES NORM
normal... Dupixent Counseling: I discussed with the patient the risks of dupilumab including but not limited to eye infection and irritation, cold sores, injection site reactions, worsening of asthma, allergic reactions and increased risk of parasitic infection.  Live vaccines should be avoided while taking dupilumab. Dupilumab will also interact with certain medications such as warfarin and cyclosporine. The patient understands that monitoring is required and they must alert us or the primary physician if symptoms of infection or other concerning signs are noted.

## 2022-08-15 NOTE — PATIENT PROFILE ADULT. - CHOOSE INDICATION TO IMMUNIZE (AN ORDER WILL BE GENERATED WHEN THIS NOTE IS SAVED):
Patient is a 81y old  Female who presents with a chief complaint of Altered Mental Status (15 Aug 2022 12:10)      SUBJECTIVE / OVERNIGHT EVENTS: Pt seen and examined at 11:45am, no overnight events. Pt denies any pain or any other complaints, has flexiseal with brownish stools. No reports of bleeding Per nsg, no other new issues reported.    MEDICATIONS  (STANDING):  chlorhexidine 2% Cloths 1 Application(s) Topical daily  magnesium oxide 400 milliGRAM(s) Oral two times a day with meals  midodrine 10 milliGRAM(s) Oral every 8 hours  pantoprazole    Tablet 40 milliGRAM(s) Oral two times a day  potassium phosphate / sodium phosphate Tablet (K-PHOS No. 2) 1 Tablet(s) Oral three times a day with meals    MEDICATIONS  (PRN):      Vital Signs Last 24 Hrs  T(C): 36.6 (15 Aug 2022 14:00), Max: 37 (15 Aug 2022 10:33)  T(F): 97.9 (15 Aug 2022 14:00), Max: 98.6 (15 Aug 2022 10:33)  HR: 80 (15 Aug 2022 14:00) (59 - 81)  BP: 127/58 (15 Aug 2022 14:00) (92/34 - 127/58)  BP(mean): 83 (15 Aug 2022 03:00) (51 - 93)  RR: 18 (15 Aug 2022 14:00) (14 - 23)  SpO2: 95% (15 Aug 2022 14:00) (95% - 100%)    Parameters below as of 15 Aug 2022 14:00  Patient On (Oxygen Delivery Method): room air      CAPILLARY BLOOD GLUCOSE        I&O's Summary    14 Aug 2022 07:01  -  15 Aug 2022 07:00  --------------------------------------------------------  IN: 1750.2 mL / OUT: 2065 mL / NET: -314.8 mL        PHYSICAL EXAM:  GENERAL: NAD, well-developed  CHEST/LUNG: Clear to auscultation bilaterally; No wheeze  HEART: Regular rate and rhythm  ABDOMEN: Soft, Nontender, Nondistended  EXTREMITIES: no LE edema  : + fu/rectal tube with brownish stools  PSYCH: Calm  NEUROLOGY: AA, oriented to self, knows she is in he hospital  SKIN: No rashes or lesions    LABS:                        7.9    4.66  )-----------( 131      ( 15 Aug 2022 03:15 )             24.1     08-15    142  |  108<H>  |  40<H>  ----------------------------<  92  3.5   |  24  |  1.17    Ca    9.0      15 Aug 2022 03:15  Phos  2.2     08-15  Mg     1.70     08-15    TPro  5.2<L>  /  Alb  3.0<L>  /  TBili  0.6  /  DBili  x   /  AST  54<H>  /  ALT  39<H>  /  AlkPhos  79  08-15              RADIOLOGY & ADDITIONAL TESTS:    Imaging Personally Reviewed:    Consultant(s) Notes Reviewed:      Care Discussed with Consultants/Other Providers:   Patient is 18 years or older (and not pregnant)

## 2022-11-17 NOTE — PATIENT PROFILE ADULT - FALL HARM RISK CONCLUSION
PAST MEDICAL HISTORY:  DM (diabetes mellitus)     HLD (hyperlipidemia)     HTN (hypertension)      Fall with Harm Risk

## 2023-04-20 NOTE — PROGRESS NOTE ADULT - SUBJECTIVE AND OBJECTIVE BOX
Follow Up:  fever, abdominal pain    Inverval History/ROS:  no complaint.  denies abd pain, dysuria, SOB.    febrile 10/5.  afebrile since.    rest of ros neg.  wife at bedside.    Allergies  No Known Allergies        ANTIMICROBIALS:  zosyn 10/3- 10/5      MEDICATIONS  (STANDING):  acetaminophen   Tablet .. 650 every 6 hours PRN  amLODIPine   Tablet 10 daily  aspirin enteric coated 325 daily  dextrose 40% Gel 15 once PRN  dextrose 50% Injectable 12.5 once  dextrose 50% Injectable 25 once  dextrose 50% Injectable 25 once  docusate sodium 100 daily  finasteride 5 daily  glucagon  Injectable 1 once PRN  heparin  Injectable 5000 every 8 hours  influenza   Vaccine 0.5 once  insulin glargine Injectable (LANTUS) 5 at bedtime  insulin lispro (HumaLOG) corrective regimen sliding scale  three times a day before meals  insulin lispro (HumaLOG) corrective regimen sliding scale  at bedtime  metoprolol succinate ER 50 daily  polyethylene glycol 3350 17 daily  senna 2 at bedtime  tamsulosin 0.4 at bedtime        Vital Signs Last 24 Hrs  T(F): 99.7 (10-08-18 @ 05:00), Max: 100.1 (10-07-18 @ 14:44)  HR: 80 (10-08-18 @ 05:00)  BP: 148/53 (10-08-18 @ 05:00)  RR: 18 (10-08-18 @ 05:00)  SpO2: 94% (10-08-18 @ 05:00) (89% - 94%)    PHYSICAL EXAM:  General: [x ] non-toxic, [place]  HEAD/EYES: [ ] PERRL [x ] white sclera [ ] icterus  ENT:  [ ] normal [x ] supple [ ] thrush [ ] pharyngeal exudate  Cardiovascular:   [ ] murmur [x ] normal [ ] PPM/AICD  Respiratory:  [x ] clear to ausculation bilaterally  GI:  [x ] soft, non-tender, normal bowel sounds  :  [ ] winter [x ] no CVA tenderness   Musculoskeletal:  [x ] no synovitis  Neurologic:  [x ] non-focal exam   Skin:  [x ] no rash  Lymph: [ ] no lymphadenopathy  Psychiatric:  [x ] appropriate affect [ ] alert & oriented  Lines:  [x ] no phlebitis [ ] central line                                8.7    5.24  )-----------( 258      ( 08 Oct 2018 04:16 )             27.8 10-08    139  |  103  |  19  ----------------------------<  72  3.6   |  24  |  1.58  Ca    7.9      08 Oct 2018 04:16Phos  2.3     10-08Mg     2.1     10-08  TPro  5.8  /  Alb  2.3  /  TBili  0.3  /  DBili  x   /  AST  25  /  ALT  17  /  AlkPhos  81  10-08      Urinalysis (10.03.18 @ 08:39)    Color: YELLOW    Urine Appearance: CLEAR    Glucose: NEGATIVE    Bilirubin: NEGATIVE    Ketone - Urine: NEGATIVE    Specific Gravity: 1.022    Blood: SMALL    pH - Urine: 6.5    Protein, Urine: >600    Urobilinogen: TRACE    Nitrite: NEGATIVE    Leukocyte Esterase Concentration: NEGATIVE    Red Blood Cell - Urine: 5-10    White Blood Cell - Urine: 0-2    Hyaline Casts: 0-2    Bacteria: NEGATIVE    Squamous Epithelial: FEW    Eosinophil Count, Urine (10.06.18 @ 10:20)    Eosinophil Count, Urine: PRESENT          MICROBIOLOGY:    BLOOD PERIPHERAL  10-03-18 --  --  --    no growth       RADIOLOGY:    < from: Xray Chest 2 Views PA/Lat (10.03.18 @ 06:35) >  Sternotomy wires and clips from previous cardiac surgery are present.   Heart is mildly enlarged but stable. No focal consolidations. No   effusions or congestion to suggest CHF.      COMPARISON:  March 14, 2016      IMPRESSION:  Clear lungs.    < end of copied text >  < from: CT Abdomen and Pelvis w/ Oral Cont and w/ IV Cont (10.03.18 @ 08:56) >  FINDINGS:    LOWER CHEST: Bibasilar dependent atelectasis with trace pleural effusions   bilaterally, right greater than left. Cardiomegaly. Coronary artery   calcifications. Sternotomy.    LIVER: Within normal limits.  BILE DUCTS: Intrahepatic and extrahepatic biliary ductal dilatation with   the common bile duct measuring 1.4 cm, unchanged.  GALLBLADDER: Cholecystectomy.  SPLEEN: Within normal limits.  PANCREAS: 1.2 cm cyst in the tail the pancreas, unchanged. No pancreatic   ductal dilatation  ADRENALS: Within normal limits.  KIDNEYS/URETERS: Subcentimeter hypodense foci in the left kidney, too   small to categorize. No hydronephrosis.    BLADDER: Within normal limits.  REPRODUCTIVE ORGANS: The prostate is enlarged.    BOWEL: No bowel obstruction. Scattered colonic diverticulosis without   inflammatory changes. Appendix is normal.  PERITONEUM: No ascites or pneumoperitoneum.  VESSELS:  Atherosclerotic changes.  RETROPERITONEUM: No lymphadenopathy.    ABDOMINAL WALL: Within normal limits.  BONES: Degenerative changes of the spine.    IMPRESSION:    Stable appearance of the bile ducts.    Normal appendix.    < end of copied text > Information: This plan will allow you to select a body location and will not render the procedure on the note output. It will note the location you select in the morphology. Detail Level: Simple

## 2023-06-07 NOTE — CONSULT NOTE ADULT - CONSULT REQUESTED BY NAME

## 2023-11-21 NOTE — PATIENT PROFILE ADULT - PATIENT REPRESENTATIVE: ( YOU CAN CHOOSE ANY PERSON THAT CAN ASSIST YOU WITH YOUR HEALTH CARE PREFERENCES, DOES NOT HAVE TO BE A SPOUSE, IMMEDIATE FAMILY OR SIGNIFICANT OTHER/PARTNER)
previous_biopsy_has_been_previously_biopsied Has The Growth Been Previously Biopsied?: has been previously biopsied yes

## 2025-04-29 NOTE — PHYSICAL THERAPY INITIAL EVALUATION ADULT - MD/RN NOTIFIED
yes
Favor benign dermal nevus - can shave remove due to persistent trauma with shaving. Patient will schedule if he desires treatment. 
Detail Level: Zone